# Patient Record
Sex: FEMALE | Race: WHITE | Employment: OTHER | URBAN - NONMETROPOLITAN AREA
[De-identification: names, ages, dates, MRNs, and addresses within clinical notes are randomized per-mention and may not be internally consistent; named-entity substitution may affect disease eponyms.]

---

## 2018-07-05 ENCOUNTER — HOSPITAL ENCOUNTER (EMERGENCY)
Age: 23
Discharge: HOME | End: 2018-07-05
Attending: FAMILY MEDICINE
Payer: COMMERCIAL

## 2018-07-05 ENCOUNTER — APPOINTMENT (OUTPATIENT)
Dept: GENERAL RADIOLOGY | Age: 23
End: 2018-07-05
Payer: COMMERCIAL

## 2018-07-05 VITALS
OXYGEN SATURATION: 98 % | RESPIRATION RATE: 18 BRPM | BODY MASS INDEX: 27.42 KG/M2 | TEMPERATURE: 98.5 F | HEIGHT: 62 IN | WEIGHT: 149 LBS | DIASTOLIC BLOOD PRESSURE: 78 MMHG | SYSTOLIC BLOOD PRESSURE: 102 MMHG | HEART RATE: 66 BPM

## 2018-07-05 DIAGNOSIS — F31.9 BIPOLAR 1 DISORDER (HCC): Primary | ICD-10-CM

## 2018-07-05 DIAGNOSIS — R06.89 DYSPNEA AND RESPIRATORY ABNORMALITIES: ICD-10-CM

## 2018-07-05 DIAGNOSIS — R06.00 DYSPNEA AND RESPIRATORY ABNORMALITIES: ICD-10-CM

## 2018-07-05 DIAGNOSIS — R07.89 OTHER CHEST PAIN: ICD-10-CM

## 2018-07-05 LAB
AMPHETAMINE+METHAMPHETAMINE URINE SCREEN: NEGATIVE
ANION GAP SERPL CALCULATED.3IONS-SCNC: 15 MEQ/L (ref 8–16)
BACTERIA: ABNORMAL /HPF
BARBITURATE QUANTITATIVE URINE: NEGATIVE
BASOPHILS # BLD: 0.3 %
BASOPHILS ABSOLUTE: 0 THOU/MM3 (ref 0–0.1)
BENZODIAZEPINE QUANTITATIVE URINE: NEGATIVE
BILIRUBIN URINE: NEGATIVE
BLOOD, URINE: NEGATIVE
BUN BLDV-MCNC: 10 MG/DL (ref 7–22)
CALCIUM SERPL-MCNC: 9.1 MG/DL (ref 8.5–10.5)
CANNABINOID QUANTITATIVE URINE: POSITIVE
CASTS 2: ABNORMAL /LPF
CASTS UA: ABNORMAL /LPF
CHARACTER, URINE: ABNORMAL
CHLORIDE BLD-SCNC: 107 MEQ/L (ref 98–111)
CO2: 22 MEQ/L (ref 23–33)
COCAINE METABOLITE QUANTITATIVE URINE: NEGATIVE
COLOR: YELLOW
CREAT SERPL-MCNC: 0.7 MG/DL (ref 0.4–1.2)
CRYSTALS, UA: ABNORMAL
D-DIMER QUANTITATIVE: < 215 NG/ML FEU (ref 0–500)
EKG ATRIAL RATE: 80 BPM
EKG P AXIS: 45 DEGREES
EKG P-R INTERVAL: 118 MS
EKG Q-T INTERVAL: 374 MS
EKG QRS DURATION: 92 MS
EKG QTC CALCULATION (BAZETT): 431 MS
EKG R AXIS: 34 DEGREES
EKG T AXIS: 33 DEGREES
EKG VENTRICULAR RATE: 80 BPM
EOSINOPHIL # BLD: 1.3 %
EOSINOPHILS ABSOLUTE: 0.1 THOU/MM3 (ref 0–0.4)
EPITHELIAL CELLS, UA: ABNORMAL /HPF
ERYTHROCYTE [DISTWIDTH] IN BLOOD BY AUTOMATED COUNT: 12.9 % (ref 11.5–14.5)
ERYTHROCYTE [DISTWIDTH] IN BLOOD BY AUTOMATED COUNT: 45.9 FL (ref 35–45)
GFR SERPL CREATININE-BSD FRML MDRD: > 90 ML/MIN/1.73M2
GLUCOSE BLD-MCNC: 82 MG/DL (ref 70–108)
GLUCOSE URINE: NEGATIVE MG/DL
HCT VFR BLD CALC: 41.3 % (ref 37–47)
HEMOGLOBIN: 13.8 GM/DL (ref 12–16)
IMMATURE GRANS (ABS): 0.04 THOU/MM3 (ref 0–0.07)
IMMATURE GRANULOCYTES: 0.4 %
KETONES, URINE: NEGATIVE
LEUKOCYTE ESTERASE, URINE: NEGATIVE
LYMPHOCYTES # BLD: 26.5 %
LYMPHOCYTES ABSOLUTE: 2.5 THOU/MM3 (ref 1–4.8)
MAGNESIUM: 2 MG/DL (ref 1.6–2.4)
MCH RBC QN AUTO: 32.3 PG (ref 26–33)
MCHC RBC AUTO-ENTMCNC: 33.4 GM/DL (ref 32.2–35.5)
MCV RBC AUTO: 96.7 FL (ref 81–99)
MISCELLANEOUS 2: ABNORMAL
MONOCYTES # BLD: 5.5 %
MONOCYTES ABSOLUTE: 0.5 THOU/MM3 (ref 0.4–1.3)
NITRITE, URINE: NEGATIVE
NUCLEATED RED BLOOD CELLS: 0 /100 WBC
OPIATES, URINE: NEGATIVE
OSMOLALITY CALCULATION: 285 MOSMOL/KG (ref 275–300)
OXYCODONE: NEGATIVE
PH UA: 7.5
PHENCYCLIDINE QUANTITATIVE URINE: NEGATIVE
PLATELET # BLD: 278 THOU/MM3 (ref 130–400)
PMV BLD AUTO: 9.9 FL (ref 9.4–12.4)
POTASSIUM SERPL-SCNC: 4.7 MEQ/L (ref 3.5–5.2)
PREGNANCY, SERUM: NEGATIVE
PROTEIN UA: NEGATIVE
RBC # BLD: 4.27 MILL/MM3 (ref 4.2–5.4)
RBC URINE: ABNORMAL /HPF
RENAL EPITHELIAL, UA: ABNORMAL
SEG NEUTROPHILS: 66 %
SEGMENTED NEUTROPHILS ABSOLUTE COUNT: 6.2 THOU/MM3 (ref 1.8–7.7)
SODIUM BLD-SCNC: 144 MEQ/L (ref 135–145)
SPECIFIC GRAVITY, URINE: 1.02 (ref 1–1.03)
TROPONIN T: < 0.01 NG/ML
UROBILINOGEN, URINE: 0.2 EU/DL
WBC # BLD: 9.4 THOU/MM3 (ref 4.8–10.8)
WBC UA: ABNORMAL /HPF
YEAST: ABNORMAL

## 2018-07-05 PROCEDURE — 80048 BASIC METABOLIC PNL TOTAL CA: CPT

## 2018-07-05 PROCEDURE — 84703 CHORIONIC GONADOTROPIN ASSAY: CPT

## 2018-07-05 PROCEDURE — 83735 ASSAY OF MAGNESIUM: CPT

## 2018-07-05 PROCEDURE — 6370000000 HC RX 637 (ALT 250 FOR IP): Performed by: FAMILY MEDICINE

## 2018-07-05 PROCEDURE — 81001 URINALYSIS AUTO W/SCOPE: CPT

## 2018-07-05 PROCEDURE — 71045 X-RAY EXAM CHEST 1 VIEW: CPT

## 2018-07-05 PROCEDURE — 85379 FIBRIN DEGRADATION QUANT: CPT

## 2018-07-05 PROCEDURE — 93005 ELECTROCARDIOGRAM TRACING: CPT | Performed by: FAMILY MEDICINE

## 2018-07-05 PROCEDURE — 85025 COMPLETE CBC W/AUTO DIFF WBC: CPT

## 2018-07-05 PROCEDURE — 93010 ELECTROCARDIOGRAM REPORT: CPT | Performed by: INTERNAL MEDICINE

## 2018-07-05 PROCEDURE — 84484 ASSAY OF TROPONIN QUANT: CPT

## 2018-07-05 PROCEDURE — 80307 DRUG TEST PRSMV CHEM ANLYZR: CPT

## 2018-07-05 PROCEDURE — 36415 COLL VENOUS BLD VENIPUNCTURE: CPT

## 2018-07-05 PROCEDURE — 99285 EMERGENCY DEPT VISIT HI MDM: CPT

## 2018-07-05 RX ORDER — BUSPIRONE HYDROCHLORIDE 5 MG/1
5 TABLET ORAL 2 TIMES DAILY
COMMUNITY
End: 2018-09-18

## 2018-07-05 RX ORDER — LORAZEPAM 1 MG/1
1 TABLET ORAL ONCE
Status: COMPLETED | OUTPATIENT
Start: 2018-07-05 | End: 2018-07-05

## 2018-07-05 RX ADMIN — LORAZEPAM 1 MG: 1 TABLET ORAL at 16:58

## 2018-07-05 ASSESSMENT — SLEEP AND FATIGUE QUESTIONNAIRES
SLEEP PATTERN: DIFFICULTY FALLING ASLEEP;DISTURBED/INTERRUPTED SLEEP
DIFFICULTY STAYING ASLEEP: YES
RESTFUL SLEEP: YES
DIFFICULTY ARISING: NO
DIFFICULTY FALLING ASLEEP: YES
DO YOU USE A SLEEP AID: NO
AVERAGE NUMBER OF SLEEP HOURS: 4
DO YOU HAVE DIFFICULTY SLEEPING: YES

## 2018-07-05 ASSESSMENT — ENCOUNTER SYMPTOMS
COUGH: 0
BACK PAIN: 0
DIARRHEA: 0
WHEEZING: 0
EYE DISCHARGE: 0
NAUSEA: 0
SHORTNESS OF BREATH: 1
VOMITING: 0
SORE THROAT: 0
RHINORRHEA: 0
EYE PAIN: 0
ABDOMINAL PAIN: 0

## 2018-07-05 ASSESSMENT — LIFESTYLE VARIABLES: HISTORY_ALCOHOL_USE: NO

## 2018-07-05 ASSESSMENT — PAIN DESCRIPTION - LOCATION: LOCATION: GENERALIZED

## 2018-07-05 ASSESSMENT — PAIN SCALES - GENERAL: PAINLEVEL_OUTOF10: 8

## 2018-07-05 ASSESSMENT — PAIN DESCRIPTION - PAIN TYPE: TYPE: ACUTE PAIN

## 2018-07-05 ASSESSMENT — PATIENT HEALTH QUESTIONNAIRE - PHQ9: SUM OF ALL RESPONSES TO PHQ QUESTIONS 1-9: 7
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
## 2018-07-05 NOTE — ED PROVIDER NOTES
created using voice recognition software. It may contain minor errors which are inherent in voice recognition technology. **      Final report electronically signed by Dr. Leif Flores on 7/5/2018 5:07 PM          LABS:   Labs Reviewed   CBC WITH AUTO DIFFERENTIAL - Abnormal; Notable for the following:        Result Value    RDW-SD 45.9 (*)     All other components within normal limits   BASIC METABOLIC PANEL - Abnormal; Notable for the following:     CO2 22 (*)     All other components within normal limits   URINE WITH REFLEXED MICRO - Abnormal; Notable for the following:     Character, Urine CLOUDY (*)     All other components within normal limits   C. TRACHOMATIS / N. GONORRHOEAE, DNA   D-DIMER, QUANTITATIVE   TROPONIN   MAGNESIUM   HCG, SERUM, QUALITATIVE   URINE DRUG SCREEN   ANION GAP   GLOMERULAR FILTRATION RATE, ESTIMATED   OSMOLALITY       EMERGENCY DEPARTMENT COURSE:   Vitals:    Vitals:    07/05/18 1650 07/05/18 1802 07/05/18 2200   BP: 116/71 108/64 102/78   Pulse: 84 69 66   Resp: 18 18 18   Temp: 98.5 °F (36.9 °C)     TempSrc: Oral     SpO2: 99% 97% 98%   Weight: 149 lb (67.6 kg)     Height: 5' 2\" (1.575 m)         5:02 PM: The patient was seen and evaluated. MDM:  The pt was seen and evaluated by me. Within the department, I observed the pt's vital signs to be within acceptable range. Laboratory and Radiological studies were performed, results were reviewed with the patient and family. Within the department, the pt was treated with Ativan. I observed the pt's condition to remain stable during the duration of their stay. Patient was evaluated by social work and he relaxes recommended admission to [place] [address]. I explained my proposed course of treatment to the pt and family, and they were amenable to my decision. They were discharged home, and they will return to the ED if their symptoms become more severe in nature, or otherwise change.        CRITICAL CARE:   None     CONSULTS:  AMAYA - recommended admission to [facility]:  None     FINAL IMPRESSION      1. Bipolar 1 disorder ([address]    2. Other chest pain    3. Dyspnea and respiratory abnormalities          DISPOSITION/PLAN   Discharge     PATIENT REFERRED TO:  [address] [place], [place] [id number]  [facility]        DISCHARGE MEDICATIONS:  Discharge Medication List as of 7/5/2018 10:20 PM          (Please note that portions of this note were completed with a voice recognition program.  Efforts were made to edit the dictations but occasionally words are mis-transcribed.)    Scribe:  Adelaida Nguyen   7/5/18 5:02 PM Scribing for and in the presence of Bel Roth MD.    Signed by: Jeanne Morales, 07/06/18 12:22 PM    Provider:  I personally performed the services described in the documentation, reviewed and edited the documentation which was dictated to the scribe in my presence, and it accurately records my words and actions.     Bel Roth MD 7/5/18 12:22 PM                          Bel Roth MD  07/06/18 [id number]
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
## 2018-07-06 ENCOUNTER — HOSPITAL ENCOUNTER (EMERGENCY)
Age: 23
Discharge: HOME | End: 2018-07-06
Attending: INTERNAL MEDICINE
Payer: COMMERCIAL

## 2018-07-06 ENCOUNTER — APPOINTMENT (OUTPATIENT)
Dept: GENERAL RADIOLOGY | Age: 23
End: 2018-07-06
Payer: COMMERCIAL

## 2018-07-06 VITALS
TEMPERATURE: 98.2 F | DIASTOLIC BLOOD PRESSURE: 60 MMHG | HEIGHT: 62 IN | OXYGEN SATURATION: 99 % | SYSTOLIC BLOOD PRESSURE: 103 MMHG | RESPIRATION RATE: 16 BRPM | HEART RATE: 80 BPM | BODY MASS INDEX: 27.42 KG/M2 | WEIGHT: 149 LBS

## 2018-07-06 DIAGNOSIS — F41.9 ANXIETY: Primary | ICD-10-CM

## 2018-07-06 LAB
ALLEN TEST: POSITIVE
ANION GAP SERPL CALCULATED.3IONS-SCNC: 13 MEQ/L (ref 8–16)
BASE EXCESS (CALCULATED): -2 MMOL/L (ref -2.5–2.5)
BASOPHILS # BLD: 0.2 %
BASOPHILS ABSOLUTE: 0 THOU/MM3 (ref 0–0.1)
BUN BLDV-MCNC: 12 MG/DL (ref 7–22)
CALCIUM SERPL-MCNC: 9.3 MG/DL (ref 8.5–10.5)
CHLAMYDIA TRACHOMATIS BY RT-PCR: NOT DETECTED
CHLORIDE BLD-SCNC: 110 MEQ/L (ref 98–111)
CO2: 22 MEQ/L (ref 23–33)
COLLECTED BY:: ABNORMAL
CREAT SERPL-MCNC: 0.8 MG/DL (ref 0.4–1.2)
CT/NG SOURCE: NORMAL
D-DIMER QUANTITATIVE: 256 NG/ML FEU (ref 0–500)
DEVICE: ABNORMAL
EOSINOPHIL # BLD: 0.5 %
EOSINOPHILS ABSOLUTE: 0.1 THOU/MM3 (ref 0–0.4)
ERYTHROCYTE [DISTWIDTH] IN BLOOD BY AUTOMATED COUNT: 12.7 % (ref 11.5–14.5)
ERYTHROCYTE [DISTWIDTH] IN BLOOD BY AUTOMATED COUNT: 45.1 FL (ref 35–45)
GFR SERPL CREATININE-BSD FRML MDRD: 87 ML/MIN/1.73M2
GLUCOSE BLD-MCNC: 88 MG/DL (ref 70–108)
HCO3: 22 MMOL/L (ref 23–28)
HCT VFR BLD CALC: 42.1 % (ref 37–47)
HEMOGLOBIN: 14.1 GM/DL (ref 12–16)
IMMATURE GRANS (ABS): 0.04 THOU/MM3 (ref 0–0.07)
IMMATURE GRANULOCYTES: 0.3 %
LYMPHOCYTES # BLD: 14.8 %
LYMPHOCYTES ABSOLUTE: 1.9 THOU/MM3 (ref 1–4.8)
MCH RBC QN AUTO: 32.3 PG (ref 26–33)
MCHC RBC AUTO-ENTMCNC: 33.5 GM/DL (ref 32.2–35.5)
MCV RBC AUTO: 96.3 FL (ref 81–99)
MONOCYTES # BLD: 3.7 %
MONOCYTES ABSOLUTE: 0.5 THOU/MM3 (ref 0.4–1.3)
NEISSERIA GONORRHOEAE BY RT-PCR: NOT DETECTED
NUCLEATED RED BLOOD CELLS: 0 /100 WBC
O2 SATURATION: 98 %
OSMOLALITY CALCULATION: 287.9 MOSMOL/KG (ref 275–300)
PCO2: 34 MMHG (ref 35–45)
PH BLOOD GAS: 7.42 (ref 7.35–7.45)
PLATELET # BLD: 301 THOU/MM3 (ref 130–400)
PMV BLD AUTO: 10.1 FL (ref 9.4–12.4)
PO2: 103 MMHG (ref 71–104)
POTASSIUM SERPL-SCNC: 4.8 MEQ/L (ref 3.5–5.2)
RBC # BLD: 4.37 MILL/MM3 (ref 4.2–5.4)
SEG NEUTROPHILS: 80.5 %
SEGMENTED NEUTROPHILS ABSOLUTE COUNT: 10.4 THOU/MM3 (ref 1.8–7.7)
SODIUM BLD-SCNC: 145 MEQ/L (ref 135–145)
SOURCE, BLOOD GAS: ABNORMAL
WBC # BLD: 12.9 THOU/MM3 (ref 4.8–10.8)

## 2018-07-06 PROCEDURE — 6370000000 HC RX 637 (ALT 250 FOR IP): Performed by: INTERNAL MEDICINE

## 2018-07-06 PROCEDURE — 87491 CHLMYD TRACH DNA AMP PROBE: CPT

## 2018-07-06 PROCEDURE — 96372 THER/PROPH/DIAG INJ SC/IM: CPT

## 2018-07-06 PROCEDURE — 85025 COMPLETE CBC W/AUTO DIFF WBC: CPT

## 2018-07-06 PROCEDURE — 71045 X-RAY EXAM CHEST 1 VIEW: CPT

## 2018-07-06 PROCEDURE — 99284 EMERGENCY DEPT VISIT MOD MDM: CPT

## 2018-07-06 PROCEDURE — 94640 AIRWAY INHALATION TREATMENT: CPT

## 2018-07-06 PROCEDURE — 6360000002 HC RX W HCPCS: Performed by: INTERNAL MEDICINE

## 2018-07-06 PROCEDURE — 36600 WITHDRAWAL OF ARTERIAL BLOOD: CPT

## 2018-07-06 PROCEDURE — 87591 N.GONORRHOEAE DNA AMP PROB: CPT

## 2018-07-06 PROCEDURE — 80048 BASIC METABOLIC PNL TOTAL CA: CPT

## 2018-07-06 PROCEDURE — 36415 COLL VENOUS BLD VENIPUNCTURE: CPT

## 2018-07-06 PROCEDURE — 82803 BLOOD GASES ANY COMBINATION: CPT

## 2018-07-06 PROCEDURE — 85379 FIBRIN DEGRADATION QUANT: CPT

## 2018-07-06 RX ORDER — PROMETHAZINE HYDROCHLORIDE 25 MG/ML
6.25 INJECTION, SOLUTION INTRAMUSCULAR; INTRAVENOUS ONCE
Status: COMPLETED | OUTPATIENT
Start: 2018-07-06 | End: 2018-07-06

## 2018-07-06 RX ORDER — PROMETHAZINE HYDROCHLORIDE 25 MG/1
12.5 TABLET ORAL EVERY 6 HOURS PRN
Qty: 10 TABLET | Refills: 0 | Status: SHIPPED | OUTPATIENT
Start: 2018-07-06 | End: 2018-07-26

## 2018-07-06 RX ORDER — IPRATROPIUM BROMIDE AND ALBUTEROL SULFATE 2.5; .5 MG/3ML; MG/3ML
1 SOLUTION RESPIRATORY (INHALATION) ONCE
Status: COMPLETED | OUTPATIENT
Start: 2018-07-06 | End: 2018-07-06

## 2018-07-06 RX ADMIN — IPRATROPIUM BROMIDE AND ALBUTEROL SULFATE 1 AMPULE: .5; 3 SOLUTION RESPIRATORY (INHALATION) at 12:43

## 2018-07-06 RX ADMIN — PROMETHAZINE HYDROCHLORIDE 6.25 MG: 25 INJECTION INTRAMUSCULAR; INTRAVENOUS at 12:36

## 2018-07-06 ASSESSMENT — ENCOUNTER SYMPTOMS
EYE DISCHARGE: 0
VOMITING: 1
SORE THROAT: 0
RHINORRHEA: 0
EYE PAIN: 0
COUGH: 0
NAUSEA: 0
DIARRHEA: 0
SHORTNESS OF BREATH: 1
BACK PAIN: 0
WHEEZING: 0
ABDOMINAL PAIN: 0

## 2018-07-06 ASSESSMENT — PAIN DESCRIPTION - LOCATION: LOCATION: THROAT;BACK

## 2018-07-06 ASSESSMENT — PAIN SCALES - GENERAL: PAINLEVEL_OUTOF10: 6

## 2018-07-06 ASSESSMENT — PAIN DESCRIPTION - PAIN TYPE: TYPE: CHRONIC PAIN

## 2018-07-06 NOTE — PROGRESS NOTES
2110  Call from Fabby Melendez, pt declined at the [facility]. Fabby Melendez state this due to Dr. Arely Cruz declining inpatient treatment and recommending outpatient mental health services, and pt has been assessed by [facility] who will follow up with pt tomorrow.   (Nurse Josefa Barfield informed)

## 2018-07-06 NOTE — ED PROVIDER NOTES
Psychiatric/Behavioral: Positive for sleep disturbance. Negative for confusion and suicidal ideas. The patient is nervous/anxious. PAST MEDICAL HISTORY    has a past medical history of Arthritis; Bipolar 1 disorder ([address] Diabetes mellitus ([address] and Fibromyalgia. SURGICAL HISTORY      has no past surgical history on file. CURRENT MEDICATIONS       Previous Medications    BUSPIRONE (BUSPAR) 5 MG TABLET    Take 5 mg by mouth 2 times daily       ALLERGIES     is allergic to gabapentin and tramadol. FAMILY HISTORY     has no family status information on file. family history is not on file. SOCIAL HISTORY      reports that she has been smoking Cigarettes. She has been smoking about 1.00 pack per day. She has never used smokeless tobacco. She reports that she drinks alcohol. She reports that she uses drugs, including Marijuana and Methamphetamines. PHYSICAL EXAM     INITIAL VITALS:  height is 5' 2\" (1.575 m) and weight is 149 lb (67.6 kg). Her oral temperature is 98.2 °F (36.8 °C). Her blood pressure is 103/60 and her pulse is 80. Her respiration is 16 and oxygen saturation is 99%. Physical Exam   Constitutional: She is oriented to person, place, and time. She appears well-developed and well-nourished. Non-toxic appearance. HENT:   Head: Normocephalic and atraumatic. Right Ear: Tympanic membrane and external ear normal.   Left Ear: Tympanic membrane and external ear normal.   Nose: Nose normal.   Mouth/Throat: Oropharynx is clear and moist and mucous membranes are normal. No oropharyngeal exudate, posterior oropharyngeal edema or posterior oropharyngeal erythema. Patient has rhinosinusitis. Eyes: Conjunctivae and EOM are normal.   Neck: Normal range of motion. Neck supple. No JVD present. Cardiovascular: Normal rate, regular rhythm, normal heart sounds, intact distal pulses and normal pulses. Exam reveals no gallop and no friction rub. No murmur heard.   Pulmonary/Chest: Effort normal and breath sounds normal. No respiratory distress. She has no decreased breath sounds. She has no wheezes. She has no rhonchi. She has no rales. Abdominal: Soft. Bowel sounds are normal. She exhibits no distension. There is no tenderness. There is no rebound, no guarding and no CVA tenderness. Musculoskeletal: Normal range of motion. She exhibits no edema. Neurological: She is alert and oriented to person, place, and time. She exhibits normal muscle tone. Coordination normal.   Skin: Skin is warm and dry. No rash noted. She is not diaphoretic. Nails show clubbing. Nursing note and vitals reviewed. DIAGNOSTIC RESULTS     EKG: All EKG's are interpreted by the Emergency Department Physician who either signs or Co-signs this chart in the absence of a cardiologist.  EKG interpreted by Lizette Cook MD:    None    RADIOLOGY: non-plain film images(s) such as CT, Ultrasound and MRI are read by the radiologist.    XR CHEST PORTABLE   Final Result   No acute findings               **This report has been created using voice recognition software. It may contain minor errors which are inherent in voice recognition technology. **      Final report electronically signed by Dr. Musa Cabrales on 7/6/2018 12:38 PM          LABS:   Labs Reviewed   CBC WITH AUTO DIFFERENTIAL - Abnormal; Notable for the following:        Result Value    WBC 12.9 (*)     RDW-SD 45.1 (*)     Segs Absolute 10.4 (*)     All other components within normal limits   BASIC METABOLIC PANEL - Abnormal; Notable for the following:     CO2 22 (*)     All other components within normal limits   BLOOD GAS, ARTERIAL - Abnormal; Notable for the following:     PCO2 34 (*)     HCO3 22 (*)     All other components within normal limits   GLOMERULAR FILTRATION RATE, ESTIMATED - Abnormal; Notable for the following:     Est, Glom Filt Rate 87 (*)     All other components within normal limits   C. TRACHOMATIS / N. GONORRHOEAE, DNA   D-DIMER, QUANTITATIVE ANION GAP   OSMOLALITY       EMERGENCY DEPARTMENT COURSE:   Vitals:    Vitals:    07/06/18 1332 07/06/18 1348 07/06/18 1448 07/06/18 1504   BP: 112/64 104/63 (!) 97/55 103/60   Pulse: 80      Resp: 16      Temp:       TempSrc:       SpO2: 97% 95% 98% 99%   Weight:       Height:           12:07 PM: The patient was seen and evaluated. MDM:  The patient has history anxiety, was hemodynamically stable. Patient's pulse ox is also good for her age. Patient was given Phenergan in the emergency Department and all her symptoms were gone. Patient was supposed to see [facility] professional services today, but they cannot see her for Monday and that triggered her anxiety and shortness of breath. Patient also was given a breathing treatment which did not help. but the Phenergan did help her a lot. Patient will be provided with prescription for Phenergan. Patient will be going to [facility] and will be seeing [employer] at [facility] on Monday morning. Patient's blood work was reviewed discussed with the patient. CRITICAL CARE:   None     CONSULTS:  None    PROCEDURES:  None     FINAL IMPRESSION      1. Anxiety          DISPOSITION/PLAN   Discharge    PATIENT REFERRED TO:  [facility]  [address] [facility] [address]  Go in 2 days      [address] [facility]  [address]  Go in 1 day  If symptoms worsen    [address] [facility]  [phone]    Call in 2 days        DISCHARGE MEDICATIONS:  New Prescriptions    PROMETHAZINE (PHENERGAN) 25 MG TABLET    Take 0.5 tablets by mouth every 6 hours as needed for Nausea WARNING:  May cause drowsiness. May impair ability to operate vehicles or machinery. Do not use in combination with alcohol.        (Please note that portions of this note were completed with a voice recognition program.  Efforts were made to edit the dictations but occasionally words are mis-transcribed.)    Scribe:  Mary Jane Fernandes 7/6/18 12:07 PM Scribing for and in the presence of Mary Jane Fernandes MD.    Signed by: Jeanne Bonner, 07/06/18 3:22 PM    Provider:  I personally performed the services described in the documentation, reviewed and edited the documentation which was dictated to the scribe in my presence, and it accurately records my words and actions.     Mary Jane Fernandes MD 7/6/18 3:22 PM                         Mary Jane Fernandes MD  07/06/18 1524

## 2018-09-18 ENCOUNTER — HOSPITAL ENCOUNTER (EMERGENCY)
Age: 23
Discharge: LEFT BEFORE DISCHARGE | End: 2018-09-18
Attending: FAMILY MEDICINE
Payer: COMMERCIAL

## 2018-09-18 VITALS
RESPIRATION RATE: 18 BRPM | SYSTOLIC BLOOD PRESSURE: 116 MMHG | OXYGEN SATURATION: 96 % | HEIGHT: 62 IN | DIASTOLIC BLOOD PRESSURE: 92 MMHG | BODY MASS INDEX: 27.6 KG/M2 | HEART RATE: 83 BPM | WEIGHT: 150 LBS | TEMPERATURE: 98.7 F

## 2018-09-18 DIAGNOSIS — F32.A DEPRESSION, UNSPECIFIED DEPRESSION TYPE: Primary | ICD-10-CM

## 2018-09-18 LAB
ALBUMIN SERPL-MCNC: 4.6 G/DL (ref 3.5–5.1)
ALP BLD-CCNC: 71 U/L (ref 38–126)
ALT SERPL-CCNC: 8 U/L (ref 11–66)
AMORPHOUS: ABNORMAL
AMPHETAMINE+METHAMPHETAMINE URINE SCREEN: NEGATIVE
ANION GAP SERPL CALCULATED.3IONS-SCNC: 17 MEQ/L (ref 8–16)
AST SERPL-CCNC: 16 U/L (ref 5–40)
BACTERIA: ABNORMAL /HPF
BARBITURATE QUANTITATIVE URINE: NEGATIVE
BASOPHILS # BLD: 0.3 %
BASOPHILS ABSOLUTE: 0 THOU/MM3 (ref 0–0.1)
BENZODIAZEPINE QUANTITATIVE URINE: NEGATIVE
BILIRUB SERPL-MCNC: 0.4 MG/DL (ref 0.3–1.2)
BILIRUBIN URINE: ABNORMAL
BLOOD, URINE: ABNORMAL
BUN BLDV-MCNC: 11 MG/DL (ref 7–22)
CALCIUM SERPL-MCNC: 10 MG/DL (ref 8.5–10.5)
CANNABINOID QUANTITATIVE URINE: NEGATIVE
CASTS 2: ABNORMAL /LPF
CASTS UA: ABNORMAL /LPF
CHARACTER, URINE: CLEAR
CHLORIDE BLD-SCNC: 100 MEQ/L (ref 98–111)
CO2: 21 MEQ/L (ref 23–33)
COCAINE METABOLITE QUANTITATIVE URINE: NEGATIVE
COLOR: YELLOW
CREAT SERPL-MCNC: 0.8 MG/DL (ref 0.4–1.2)
CRYSTALS, UA: ABNORMAL
CRYSTALS, UA: ABNORMAL
EKG ATRIAL RATE: 90 BPM
EKG P AXIS: 54 DEGREES
EKG P-R INTERVAL: 138 MS
EKG Q-T INTERVAL: 358 MS
EKG QRS DURATION: 90 MS
EKG QTC CALCULATION (BAZETT): 437 MS
EKG R AXIS: 23 DEGREES
EKG T AXIS: 24 DEGREES
EKG VENTRICULAR RATE: 90 BPM
EOSINOPHIL # BLD: 1.7 %
EOSINOPHILS ABSOLUTE: 0.1 THOU/MM3 (ref 0–0.4)
EPITHELIAL CELLS, UA: ABNORMAL /HPF
ERYTHROCYTE [DISTWIDTH] IN BLOOD BY AUTOMATED COUNT: 11.9 % (ref 11.5–14.5)
ERYTHROCYTE [DISTWIDTH] IN BLOOD BY AUTOMATED COUNT: 40.6 FL (ref 35–45)
GFR SERPL CREATININE-BSD FRML MDRD: 87 ML/MIN/1.73M2
GLUCOSE BLD-MCNC: 74 MG/DL (ref 70–108)
GLUCOSE URINE: NEGATIVE MG/DL
HCT VFR BLD CALC: 43.7 % (ref 37–47)
HEMOGLOBIN: 14.9 GM/DL (ref 12–16)
ICTOTEST: NEGATIVE
IMMATURE GRANS (ABS): 0.02 THOU/MM3 (ref 0–0.07)
IMMATURE GRANULOCYTES: 0.3 %
KETONES, URINE: NEGATIVE
LEUKOCYTE ESTERASE, URINE: ABNORMAL
LYMPHOCYTES # BLD: 31.5 %
LYMPHOCYTES ABSOLUTE: 2.4 THOU/MM3 (ref 1–4.8)
MCH RBC QN AUTO: 31.9 PG (ref 26–33)
MCHC RBC AUTO-ENTMCNC: 34.1 GM/DL (ref 32.2–35.5)
MCV RBC AUTO: 93.6 FL (ref 81–99)
MISCELLANEOUS 2: ABNORMAL
MISCELLANEOUS LAB TEST RESULT: ABNORMAL
MONOCYTES # BLD: 7.1 %
MONOCYTES ABSOLUTE: 0.5 THOU/MM3 (ref 0.4–1.3)
MUCUS: ABNORMAL
NITRITE, URINE: NEGATIVE
NUCLEATED RED BLOOD CELLS: 0 /100 WBC
OPIATES, URINE: NEGATIVE
OSMOLALITY CALCULATION: 273.7 MOSMOL/KG (ref 275–300)
OXYCODONE: NEGATIVE
PH UA: 6
PHENCYCLIDINE QUANTITATIVE URINE: NEGATIVE
PLATELET # BLD: 298 THOU/MM3 (ref 130–400)
PMV BLD AUTO: 10.3 FL (ref 9.4–12.4)
POTASSIUM SERPL-SCNC: 4.7 MEQ/L (ref 3.5–5.2)
PREGNANCY, URINE: NEGATIVE
PROTEIN UA: ABNORMAL
RBC # BLD: 4.67 MILL/MM3 (ref 4.2–5.4)
RBC URINE: ABNORMAL /HPF
RENAL EPITHELIAL, UA: ABNORMAL
SEG NEUTROPHILS: 59.1 %
SEGMENTED NEUTROPHILS ABSOLUTE COUNT: 4.5 THOU/MM3 (ref 1.8–7.7)
SODIUM BLD-SCNC: 138 MEQ/L (ref 135–145)
SPECIFIC GRAVITY, URINE: >= 1.03 (ref 1–1.03)
TOTAL PROTEIN: 8.1 G/DL (ref 6.1–8)
TSH SERPL DL<=0.05 MIU/L-ACNC: 0.99 UIU/ML (ref 0.4–4.2)
UROBILINOGEN, URINE: 0.2 EU/DL
WBC # BLD: 7.6 THOU/MM3 (ref 4.8–10.8)
WBC UA: ABNORMAL /HPF
YEAST: ABNORMAL

## 2018-09-18 PROCEDURE — 85025 COMPLETE CBC W/AUTO DIFF WBC: CPT

## 2018-09-18 PROCEDURE — 99284 EMERGENCY DEPT VISIT MOD MDM: CPT

## 2018-09-18 PROCEDURE — 80307 DRUG TEST PRSMV CHEM ANLYZR: CPT

## 2018-09-18 PROCEDURE — 87086 URINE CULTURE/COLONY COUNT: CPT

## 2018-09-18 PROCEDURE — 80053 COMPREHEN METABOLIC PANEL: CPT

## 2018-09-18 PROCEDURE — 36415 COLL VENOUS BLD VENIPUNCTURE: CPT

## 2018-09-18 PROCEDURE — 93005 ELECTROCARDIOGRAM TRACING: CPT | Performed by: FAMILY MEDICINE

## 2018-09-18 PROCEDURE — 81001 URINALYSIS AUTO W/SCOPE: CPT

## 2018-09-18 PROCEDURE — 81025 URINE PREGNANCY TEST: CPT

## 2018-09-18 PROCEDURE — 84443 ASSAY THYROID STIM HORMONE: CPT

## 2018-09-18 RX ORDER — RISPERIDONE 1 MG/1
1 TABLET, FILM COATED ORAL 2 TIMES DAILY
Status: ON HOLD | COMMUNITY
End: 2018-10-06

## 2018-09-18 RX ORDER — CLOMIPRAMINE HYDROCHLORIDE 50 MG/1
100 CAPSULE ORAL NIGHTLY
Status: ON HOLD | COMMUNITY
End: 2018-10-06 | Stop reason: HOSPADM

## 2018-09-18 ASSESSMENT — ENCOUNTER SYMPTOMS
RHINORRHEA: 0
ABDOMINAL PAIN: 1
EYE DISCHARGE: 0
SORE THROAT: 0
BACK PAIN: 0
DIARRHEA: 0
EYE PAIN: 0
NAUSEA: 0
WHEEZING: 0
SHORTNESS OF BREATH: 0
COUGH: 0
VOMITING: 0

## 2018-09-18 NOTE — ED TRIAGE NOTES
Pt presents to rm 44 with grandmother c/o being sent from [facility] and needing a psych eval. Pt has been seen here before for the same reason. Grandmother states that pt has not eaten in 2-3 days and has not been getting out of bed for her daily activities. Dr. Jovita Howard at bedside for evaluation. Level B paged. Pt denies suicidal or homicidal thoughts at this time. Updated on POC. VSS. Call light in reach. Will monitor.

## 2018-09-18 NOTE — ED NOTES
[facility] with AMAYA at bedside to inform pt and grandmother on plan for discharge. Pt and grandmother very upset and do not want to wait for discharge or to talk to Dr. Fareed Rm. Pt dressed and walking out of department when Dr. Fareed Rm was walking to pt's rm. Spoke with pt, still unhappy with care and walked out to ED lobby.  Laila Yoder RN, which pt and grandmother requested to be notified at side of pt to speak with her and grandmother when pt out of hospital.        Ellie Pack RN  09/18/18 6939
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
## 2018-09-19 LAB
ORGANISM: ABNORMAL
URINE CULTURE REFLEX: ABNORMAL

## 2018-09-19 PROCEDURE — 93010 ELECTROCARDIOGRAM REPORT: CPT | Performed by: INTERNAL MEDICINE

## 2018-10-02 ENCOUNTER — APPOINTMENT (OUTPATIENT)
Dept: CT IMAGING | Age: 23
DRG: 885 | End: 2018-10-02
Payer: COMMERCIAL

## 2018-10-02 ENCOUNTER — HOSPITAL ENCOUNTER (INPATIENT)
Age: 23
LOS: 4 days | Discharge: HOME | DRG: 885 | End: 2018-10-06
Attending: EMERGENCY MEDICINE | Admitting: PSYCHIATRY & NEUROLOGY
Payer: COMMERCIAL

## 2018-10-02 ENCOUNTER — APPOINTMENT (OUTPATIENT)
Dept: GENERAL RADIOLOGY | Age: 23
DRG: 885 | End: 2018-10-02
Payer: COMMERCIAL

## 2018-10-02 DIAGNOSIS — F41.1 ANXIETY STATE: Primary | ICD-10-CM

## 2018-10-02 DIAGNOSIS — R07.89 CHEST TIGHTNESS: ICD-10-CM

## 2018-10-02 PROBLEM — F20.9 SCHIZOPHRENIA (HCC): Status: ACTIVE | Noted: 2018-10-02

## 2018-10-02 LAB
ALBUMIN SERPL-MCNC: 4.7 G/DL (ref 3.5–5.1)
ALP BLD-CCNC: 61 U/L (ref 38–126)
ALT SERPL-CCNC: 9 U/L (ref 11–66)
ANION GAP SERPL CALCULATED.3IONS-SCNC: 16 MEQ/L (ref 8–16)
AST SERPL-CCNC: 18 U/L (ref 5–40)
BACTERIA: ABNORMAL /HPF
BASOPHILS # BLD: 0.5 %
BASOPHILS ABSOLUTE: 0 THOU/MM3 (ref 0–0.1)
BILIRUB SERPL-MCNC: 0.2 MG/DL (ref 0.3–1.2)
BILIRUBIN URINE: NEGATIVE
BLOOD, URINE: NEGATIVE
BUN BLDV-MCNC: 14 MG/DL (ref 7–22)
CALCIUM SERPL-MCNC: 9.5 MG/DL (ref 8.5–10.5)
CASTS 2: ABNORMAL /LPF
CASTS UA: ABNORMAL /LPF
CHARACTER, URINE: ABNORMAL
CHLORIDE BLD-SCNC: 103 MEQ/L (ref 98–111)
CO2: 19 MEQ/L (ref 23–33)
COLOR: YELLOW
CREAT SERPL-MCNC: 0.8 MG/DL (ref 0.4–1.2)
CRYSTALS, UA: ABNORMAL
D-DIMER QUANTITATIVE: 529 NG/ML FEU (ref 0–500)
EKG ATRIAL RATE: 87 BPM
EKG P AXIS: 50 DEGREES
EKG P-R INTERVAL: 134 MS
EKG Q-T INTERVAL: 370 MS
EKG QRS DURATION: 92 MS
EKG QTC CALCULATION (BAZETT): 445 MS
EKG R AXIS: 56 DEGREES
EKG T AXIS: 35 DEGREES
EKG VENTRICULAR RATE: 87 BPM
EOSINOPHIL # BLD: 2.4 %
EOSINOPHILS ABSOLUTE: 0.2 THOU/MM3 (ref 0–0.4)
EPITHELIAL CELLS, UA: ABNORMAL /HPF
ERYTHROCYTE [DISTWIDTH] IN BLOOD BY AUTOMATED COUNT: 11.9 % (ref 11.5–14.5)
ERYTHROCYTE [DISTWIDTH] IN BLOOD BY AUTOMATED COUNT: 41 FL (ref 35–45)
GFR SERPL CREATININE-BSD FRML MDRD: 87 ML/MIN/1.73M2
GLUCOSE BLD-MCNC: 92 MG/DL (ref 70–108)
GLUCOSE URINE: NEGATIVE MG/DL
HCT VFR BLD CALC: 41.3 % (ref 37–47)
HEMOGLOBIN: 14.2 GM/DL (ref 12–16)
IMMATURE GRANS (ABS): 0.02 THOU/MM3 (ref 0–0.07)
IMMATURE GRANULOCYTES: 0.2 %
KETONES, URINE: NEGATIVE
LEUKOCYTE ESTERASE, URINE: NEGATIVE
LYMPHOCYTES # BLD: 31.1 %
LYMPHOCYTES ABSOLUTE: 2.6 THOU/MM3 (ref 1–4.8)
MCH RBC QN AUTO: 32.1 PG (ref 26–33)
MCHC RBC AUTO-ENTMCNC: 34.4 GM/DL (ref 32.2–35.5)
MCV RBC AUTO: 93.4 FL (ref 81–99)
MISCELLANEOUS 2: ABNORMAL
MONOCYTES # BLD: 7.1 %
MONOCYTES ABSOLUTE: 0.6 THOU/MM3 (ref 0.4–1.3)
NITRITE, URINE: NEGATIVE
NUCLEATED RED BLOOD CELLS: 0 /100 WBC
OSMOLALITY CALCULATION: 275.8 MOSMOL/KG (ref 275–300)
PH UA: 5.5
PLATELET # BLD: 332 THOU/MM3 (ref 130–400)
PMV BLD AUTO: 9.7 FL (ref 9.4–12.4)
POTASSIUM SERPL-SCNC: 4.5 MEQ/L (ref 3.5–5.2)
PREGNANCY, SERUM: NEGATIVE
PROTEIN UA: NEGATIVE
RBC # BLD: 4.42 MILL/MM3 (ref 4.2–5.4)
RBC URINE: ABNORMAL /HPF
RENAL EPITHELIAL, UA: ABNORMAL
SEG NEUTROPHILS: 58.7 %
SEGMENTED NEUTROPHILS ABSOLUTE COUNT: 4.9 THOU/MM3 (ref 1.8–7.7)
SODIUM BLD-SCNC: 138 MEQ/L (ref 135–145)
SPECIFIC GRAVITY, URINE: 1.02 (ref 1–1.03)
TOTAL PROTEIN: 7.5 G/DL (ref 6.1–8)
UROBILINOGEN, URINE: 0.2 EU/DL
WBC # BLD: 8.4 THOU/MM3 (ref 4.8–10.8)
WBC UA: ABNORMAL /HPF
YEAST: ABNORMAL

## 2018-10-02 PROCEDURE — 85379 FIBRIN DEGRADATION QUANT: CPT

## 2018-10-02 PROCEDURE — 93005 ELECTROCARDIOGRAM TRACING: CPT | Performed by: EMERGENCY MEDICINE

## 2018-10-02 PROCEDURE — 99285 EMERGENCY DEPT VISIT HI MDM: CPT

## 2018-10-02 PROCEDURE — 1240000000 HC EMOTIONAL WELLNESS R&B

## 2018-10-02 PROCEDURE — 93010 ELECTROCARDIOGRAM REPORT: CPT | Performed by: INTERNAL MEDICINE

## 2018-10-02 PROCEDURE — 6360000004 HC RX CONTRAST MEDICATION: Performed by: EMERGENCY MEDICINE

## 2018-10-02 PROCEDURE — 81001 URINALYSIS AUTO W/SCOPE: CPT

## 2018-10-02 PROCEDURE — 84703 CHORIONIC GONADOTROPIN ASSAY: CPT

## 2018-10-02 PROCEDURE — 6370000000 HC RX 637 (ALT 250 FOR IP): Performed by: EMERGENCY MEDICINE

## 2018-10-02 PROCEDURE — 71275 CT ANGIOGRAPHY CHEST: CPT

## 2018-10-02 PROCEDURE — 80053 COMPREHEN METABOLIC PANEL: CPT

## 2018-10-02 PROCEDURE — 96374 THER/PROPH/DIAG INJ IV PUSH: CPT

## 2018-10-02 PROCEDURE — 85025 COMPLETE CBC W/AUTO DIFF WBC: CPT

## 2018-10-02 PROCEDURE — 71046 X-RAY EXAM CHEST 2 VIEWS: CPT

## 2018-10-02 PROCEDURE — 36415 COLL VENOUS BLD VENIPUNCTURE: CPT

## 2018-10-02 RX ORDER — TRAZODONE HYDROCHLORIDE 50 MG/1
50 TABLET ORAL NIGHTLY PRN
Status: DISCONTINUED | OUTPATIENT
Start: 2018-10-02 | End: 2018-10-06 | Stop reason: HOSPADM

## 2018-10-02 RX ORDER — HYDROXYZINE PAMOATE 50 MG/1
50 CAPSULE ORAL 3 TIMES DAILY PRN
Status: DISCONTINUED | OUTPATIENT
Start: 2018-10-02 | End: 2018-10-06 | Stop reason: HOSPADM

## 2018-10-02 RX ORDER — M-VIT,TX,IRON,MINS/CALC/FOLIC 27MG-0.4MG
1 TABLET ORAL DAILY
Status: DISCONTINUED | OUTPATIENT
Start: 2018-10-03 | End: 2018-10-06 | Stop reason: HOSPADM

## 2018-10-02 RX ORDER — NICOTINE 21 MG/24HR
1 PATCH, TRANSDERMAL 24 HOURS TRANSDERMAL DAILY
Status: DISCONTINUED | OUTPATIENT
Start: 2018-10-03 | End: 2018-10-06 | Stop reason: HOSPADM

## 2018-10-02 RX ORDER — CLOMIPRAMINE HYDROCHLORIDE 25 MG/1
100 CAPSULE ORAL NIGHTLY
Status: DISCONTINUED | OUTPATIENT
Start: 2018-10-02 | End: 2018-10-03

## 2018-10-02 RX ORDER — RISPERIDONE 1 MG/1
1 TABLET, FILM COATED ORAL 2 TIMES DAILY
Status: DISCONTINUED | OUTPATIENT
Start: 2018-10-02 | End: 2018-10-06 | Stop reason: HOSPADM

## 2018-10-02 RX ORDER — BUSPIRONE HYDROCHLORIDE 5 MG/1
5 TABLET ORAL 2 TIMES DAILY
Status: DISCONTINUED | OUTPATIENT
Start: 2018-10-02 | End: 2018-10-06 | Stop reason: HOSPADM

## 2018-10-02 RX ORDER — SULFAMETHOXAZOLE AND TRIMETHOPRIM 800; 160 MG/1; MG/1
1 TABLET ORAL 2 TIMES DAILY
Status: ON HOLD | COMMUNITY
End: 2018-10-06 | Stop reason: HOSPADM

## 2018-10-02 RX ORDER — HYDROXYZINE PAMOATE 50 MG/1
50 CAPSULE ORAL 3 TIMES DAILY PRN
Status: ON HOLD | COMMUNITY
End: 2018-10-06

## 2018-10-02 RX ORDER — ACETAMINOPHEN 325 MG/1
650 TABLET ORAL EVERY 4 HOURS PRN
Status: DISCONTINUED | OUTPATIENT
Start: 2018-10-02 | End: 2018-10-06 | Stop reason: HOSPADM

## 2018-10-02 RX ORDER — LORAZEPAM 1 MG/1
2 TABLET ORAL ONCE
Status: COMPLETED | OUTPATIENT
Start: 2018-10-02 | End: 2018-10-02

## 2018-10-02 RX ORDER — BUSPIRONE HYDROCHLORIDE 5 MG/1
5 TABLET ORAL 2 TIMES DAILY
Status: ON HOLD | COMMUNITY
End: 2018-10-06

## 2018-10-02 RX ORDER — LORAZEPAM 2 MG/ML
1 INJECTION INTRAMUSCULAR ONCE
Status: DISCONTINUED | OUTPATIENT
Start: 2018-10-02 | End: 2018-10-06 | Stop reason: HOSPADM

## 2018-10-02 RX ORDER — SULFAMETHOXAZOLE AND TRIMETHOPRIM 800; 160 MG/1; MG/1
1 TABLET ORAL 2 TIMES DAILY
Status: DISCONTINUED | OUTPATIENT
Start: 2018-10-02 | End: 2018-10-06 | Stop reason: HOSPADM

## 2018-10-02 RX ADMIN — LORAZEPAM 2 MG: 1 TABLET ORAL at 15:26

## 2018-10-02 RX ADMIN — IOPAMIDOL 80 ML: 755 INJECTION, SOLUTION INTRAVENOUS at 17:31

## 2018-10-02 ASSESSMENT — SLEEP AND FATIGUE QUESTIONNAIRES
AVERAGE NUMBER OF SLEEP HOURS: 8
DO YOU HAVE DIFFICULTY SLEEPING: NO
DO YOU USE A SLEEP AID: NO

## 2018-10-02 ASSESSMENT — ENCOUNTER SYMPTOMS
SORE THROAT: 0
DIARRHEA: 0
COUGH: 0
PHOTOPHOBIA: 0
BACK PAIN: 0
NAUSEA: 0
BLOOD IN STOOL: 0
CHEST TIGHTNESS: 1
TROUBLE SWALLOWING: 0
SHORTNESS OF BREATH: 1
FACIAL SWELLING: 0
VOICE CHANGE: 0
ABDOMINAL PAIN: 0
VOMITING: 0
WHEEZING: 0

## 2018-10-02 ASSESSMENT — LIFESTYLE VARIABLES: HISTORY_ALCOHOL_USE: NO

## 2018-10-02 ASSESSMENT — PAIN DESCRIPTION - LOCATION
LOCATION: GENERALIZED
LOCATION: GENERALIZED

## 2018-10-02 ASSESSMENT — PAIN SCALES - GENERAL
PAINLEVEL_OUTOF10: 0
PAINLEVEL_OUTOF10: 10
PAINLEVEL_OUTOF10: 10
PAINLEVEL_OUTOF10: 0

## 2018-10-02 ASSESSMENT — PAIN DESCRIPTION - PAIN TYPE
TYPE: CHRONIC PAIN
TYPE: CHRONIC PAIN

## 2018-10-02 NOTE — ED PROVIDER NOTES
report electronically signed by Dr. Pantera Pagan on 10/2/2018 6:14 PM      XR CHEST STANDARD (2 VW)   Final Result   Normal chest. No acute findings. **This report has been created using voice recognition software. It may contain minor errors which are inherent in voice recognition technology. **      Final report electronically signed by Dr. Yoav Pak on 10/2/2018 3:03 PM          LABS:  Labs Reviewed   COMPREHENSIVE METABOLIC PANEL - Abnormal; Notable for the following:        Result Value    CO2 19 (*)     Total Bilirubin 0.2 (*)     ALT 9 (*)     All other components within normal limits   D-DIMER, QUANTITATIVE - Abnormal; Notable for the following:     D-Dimer, Quant 529.00 (*)     All other components within normal limits   GLOMERULAR FILTRATION RATE, ESTIMATED - Abnormal; Notable for the following:     Est, Glom Filt Rate 87 (*)     All other components within normal limits   URINE WITH REFLEXED MICRO - Abnormal; Notable for the following:     Character, Urine CLOUDY (*)     All other components within normal limits   CBC WITH AUTO DIFFERENTIAL   HCG, SERUM, QUALITATIVE   ANION GAP   OSMOLALITY       All other labs were within normal range or not returned as of this dictation. EMERGENCY DEPARTMENT COURSE and Medical Decision Making:     @[email or web address]@  [facility]/     Patient presents with shortness of breath and chest pain, as well as psychiatric issues. She is worked up in the emergency department and there is no medical reason to keep her from a psychiatric hospitalization at this time. ED Medications administered this visit:    Medications   LORazepam (ATIVAN) injection 1 mg (1 mg Intravenous Not Given 10/2/18 1523)   LORazepam (ATIVAN) tablet 2 mg (2 mg Oral Given 10/2/18 1526)   iopamidol (ISOVUE-370) 76 % injection 80 mL (80 mLs Intravenous Given 10/2/18 6933)       CONSULTS: (None if blank)  None    Procedures: (None if blank)       CLINICAL IMPRESSION      1. Anxiety state    2.  Chest

## 2018-10-03 PROCEDURE — 6370000000 HC RX 637 (ALT 250 FOR IP): Performed by: PSYCHIATRY & NEUROLOGY

## 2018-10-03 PROCEDURE — 99222 1ST HOSP IP/OBS MODERATE 55: CPT | Performed by: PSYCHIATRY & NEUROLOGY

## 2018-10-03 PROCEDURE — 1240000000 HC EMOTIONAL WELLNESS R&B

## 2018-10-03 RX ADMIN — RISPERIDONE 1 MG: 1 TABLET ORAL at 08:58

## 2018-10-03 RX ADMIN — TRAZODONE HYDROCHLORIDE 50 MG: 50 TABLET ORAL at 20:42

## 2018-10-03 RX ADMIN — RISPERIDONE 1 MG: 1 TABLET ORAL at 20:43

## 2018-10-03 RX ADMIN — SULFAMETHOXAZOLE AND TRIMETHOPRIM 1 TABLET: 800; 160 TABLET ORAL at 08:58

## 2018-10-03 RX ADMIN — SULFAMETHOXAZOLE AND TRIMETHOPRIM 1 TABLET: 800; 160 TABLET ORAL at 20:43

## 2018-10-03 RX ADMIN — BUSPIRONE HYDROCHLORIDE 5 MG: 5 TABLET ORAL at 08:58

## 2018-10-03 RX ADMIN — MULTIPLE VITAMINS W/ MINERALS TAB 1 TABLET: TAB at 08:58

## 2018-10-03 RX ADMIN — BUSPIRONE HYDROCHLORIDE 5 MG: 5 TABLET ORAL at 20:42

## 2018-10-03 RX ADMIN — HYDROXYZINE PAMOATE 50 MG: 50 CAPSULE ORAL at 17:37

## 2018-10-03 RX ADMIN — ACETAMINOPHEN 650 MG: 325 TABLET ORAL at 19:55

## 2018-10-03 ASSESSMENT — PAIN SCALES - GENERAL
PAINLEVEL_OUTOF10: 8
PAINLEVEL_OUTOF10: 8
PAINLEVEL_OUTOF10: 4
PAINLEVEL_OUTOF10: 6

## 2018-10-03 ASSESSMENT — PAIN DESCRIPTION - PAIN TYPE
TYPE: CHRONIC PAIN
TYPE: CHRONIC PAIN

## 2018-10-03 ASSESSMENT — PAIN DESCRIPTION - DESCRIPTORS: DESCRIPTORS: ACHING

## 2018-10-03 ASSESSMENT — PAIN DESCRIPTION - FREQUENCY: FREQUENCY: CONTINUOUS

## 2018-10-03 ASSESSMENT — PAIN DESCRIPTION - LOCATION
LOCATION: GENERALIZED
LOCATION: GENERALIZED

## 2018-10-03 ASSESSMENT — PAIN DESCRIPTION - PROGRESSION: CLINICAL_PROGRESSION: NOT CHANGED

## 2018-10-03 ASSESSMENT — PAIN DESCRIPTION - ONSET: ONSET: ON-GOING
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
## 2018-10-03 NOTE — PLAN OF CARE
Problem: Social interaction  Goal: Increased social interaction  Outcome: Not Met This Shift  Patient has not met his socialization goal for today as she has not attended any of the groups and has not been out for socialization with others. Patient will be encouraged to attend all groups and to come out of her room for social interaction with others daily during her hospital stay.

## 2018-10-04 PROCEDURE — 1240000000 HC EMOTIONAL WELLNESS R&B

## 2018-10-04 PROCEDURE — 6370000000 HC RX 637 (ALT 250 FOR IP): Performed by: PSYCHIATRY & NEUROLOGY

## 2018-10-04 PROCEDURE — 99232 SBSQ HOSP IP/OBS MODERATE 35: CPT | Performed by: PSYCHIATRY & NEUROLOGY

## 2018-10-04 RX ORDER — VENLAFAXINE HYDROCHLORIDE 37.5 MG/1
37.5 CAPSULE, EXTENDED RELEASE ORAL
Status: DISCONTINUED | OUTPATIENT
Start: 2018-10-04 | End: 2018-10-06 | Stop reason: HOSPADM

## 2018-10-04 RX ADMIN — HYDROXYZINE PAMOATE 50 MG: 50 CAPSULE ORAL at 09:21

## 2018-10-04 RX ADMIN — MULTIPLE VITAMINS W/ MINERALS TAB 1 TABLET: TAB at 08:07

## 2018-10-04 RX ADMIN — RISPERIDONE 1 MG: 1 TABLET ORAL at 20:27

## 2018-10-04 RX ADMIN — TRAZODONE HYDROCHLORIDE 50 MG: 50 TABLET ORAL at 20:26

## 2018-10-04 RX ADMIN — BUSPIRONE HYDROCHLORIDE 5 MG: 5 TABLET ORAL at 20:27

## 2018-10-04 RX ADMIN — SULFAMETHOXAZOLE AND TRIMETHOPRIM 1 TABLET: 800; 160 TABLET ORAL at 20:27

## 2018-10-04 RX ADMIN — ACETAMINOPHEN 650 MG: 325 TABLET ORAL at 08:07

## 2018-10-04 RX ADMIN — SULFAMETHOXAZOLE AND TRIMETHOPRIM 1 TABLET: 800; 160 TABLET ORAL at 08:07

## 2018-10-04 RX ADMIN — VENLAFAXINE HYDROCHLORIDE 37.5 MG: 37.5 CAPSULE, EXTENDED RELEASE ORAL at 11:00

## 2018-10-04 RX ADMIN — BUSPIRONE HYDROCHLORIDE 5 MG: 5 TABLET ORAL at 08:07

## 2018-10-04 RX ADMIN — RISPERIDONE 1 MG: 1 TABLET ORAL at 08:07

## 2018-10-04 ASSESSMENT — PAIN DESCRIPTION - PAIN TYPE: TYPE: CHRONIC PAIN

## 2018-10-04 ASSESSMENT — PAIN DESCRIPTION - LOCATION: LOCATION: GENERALIZED

## 2018-10-04 ASSESSMENT — PAIN SCALES - GENERAL
PAINLEVEL_OUTOF10: 6
PAINLEVEL_OUTOF10: 2
PAINLEVEL_OUTOF10: 6
PAINLEVEL_OUTOF10: 2

## 2018-10-04 ASSESSMENT — PAIN DESCRIPTION - PROGRESSION: CLINICAL_PROGRESSION: NOT CHANGED

## 2018-10-05 PROCEDURE — 1240000000 HC EMOTIONAL WELLNESS R&B

## 2018-10-05 PROCEDURE — 6370000000 HC RX 637 (ALT 250 FOR IP): Performed by: PSYCHIATRY & NEUROLOGY

## 2018-10-05 PROCEDURE — 99232 SBSQ HOSP IP/OBS MODERATE 35: CPT | Performed by: PSYCHIATRY & NEUROLOGY

## 2018-10-05 PROCEDURE — APPSS30 APP SPLIT SHARED TIME 16-30 MINUTES: Performed by: NURSE PRACTITIONER

## 2018-10-05 RX ADMIN — SULFAMETHOXAZOLE AND TRIMETHOPRIM 1 TABLET: 800; 160 TABLET ORAL at 08:02

## 2018-10-05 RX ADMIN — ACETAMINOPHEN 650 MG: 325 TABLET ORAL at 12:53

## 2018-10-05 RX ADMIN — RISPERIDONE 1 MG: 1 TABLET ORAL at 21:02

## 2018-10-05 RX ADMIN — BUSPIRONE HYDROCHLORIDE 5 MG: 5 TABLET ORAL at 08:02

## 2018-10-05 RX ADMIN — VENLAFAXINE HYDROCHLORIDE 37.5 MG: 37.5 CAPSULE, EXTENDED RELEASE ORAL at 08:02

## 2018-10-05 RX ADMIN — TRAZODONE HYDROCHLORIDE 50 MG: 50 TABLET ORAL at 21:02

## 2018-10-05 RX ADMIN — RISPERIDONE 1 MG: 1 TABLET ORAL at 08:02

## 2018-10-05 RX ADMIN — SULFAMETHOXAZOLE AND TRIMETHOPRIM 1 TABLET: 800; 160 TABLET ORAL at 21:02

## 2018-10-05 RX ADMIN — BUSPIRONE HYDROCHLORIDE 5 MG: 5 TABLET ORAL at 21:02

## 2018-10-05 RX ADMIN — MULTIPLE VITAMINS W/ MINERALS TAB 1 TABLET: TAB at 08:02

## 2018-10-05 ASSESSMENT — PAIN SCALES - GENERAL
PAINLEVEL_OUTOF10: 0
PAINLEVEL_OUTOF10: 4

## 2018-10-05 NOTE — PROGRESS NOTES
Group therapy 1330-Pt was encouraged to attend group but did not.
Pt. Refused to attend Recreation group, 1:1 offered and refused.
Spoke with Henry Rodriguez from [place] and Antonio from . Pt has been accepted to  for admission by Dr. Mitra Sun pending medical clearance. AMAYA does not need to do an assessment.
Daily Sleep (WDL): Within Defined Limits         Patient Currently in Pain: Denies  Daily Nutrition (WDL): Exceptions to WDL  Appetite Change: Decreased  Barriers to Nutrition: None  Level of Assistance: Independent/Self    Patient Monitoring:  Frequency of Checks: 4 times per hour, close    Psychiatric Symptoms:   Depression Symptoms  Depression Symptoms: Loss of interest, Change in energy level, Isolative  Anxiety Symptoms  Anxiety Symptoms: Generalized  Aliyah Symptoms  Aliyah Symptoms: No problems reported or observed. Psychosis Symptoms  Delusion Type: No problems reported or observed. (none expressed)    Suicide Risk CSSR-S:  1) Within the past month, have you wished you were dead or wished you could go to sleep and not wake up? : NO  2) Within the past month, have you actually had any thoughts of killing yourself? : NO  6)  Have you ever done anything, started to do anything, or prepared to do anything to end your life?: NO        EDUCATION:   Learner Progress Toward Treatment Goals: Reviewed results and recommendations of this team, Reviewed group plan and strategies, Reviewed signs, symptoms and risk of self harm and violent behavior and Reviewed goals and plan of care    Method: Small group    Outcome: Verbalized understanding and Demonstrated Understanding    PATIENT GOALS: Daily    PLAN/TREATMENT RECOMMENDATIONS UPDATE:  1. How are you progressing toward meeting your main treatment goal? Pt voiced improvement  2. Are there discharge barriers/lingering problems that need to be addressed? Pt is active with [facility]. 3.  Do you have the ability to pay for your medications? BCBS       4. How is your group participation? Not attending.  Pt encouraged to attend    GOALS UPDATE:   Time frame for Short-Term Goals: Daily      Milana Lab, LSW

## 2018-10-06 VITALS
HEIGHT: 62 IN | SYSTOLIC BLOOD PRESSURE: 124 MMHG | HEART RATE: 77 BPM | DIASTOLIC BLOOD PRESSURE: 66 MMHG | BODY MASS INDEX: 29.44 KG/M2 | OXYGEN SATURATION: 98 % | WEIGHT: 160 LBS | RESPIRATION RATE: 16 BRPM | TEMPERATURE: 97.9 F

## 2018-10-06 PROCEDURE — 99239 HOSP IP/OBS DSCHRG MGMT >30: CPT | Performed by: PSYCHIATRY & NEUROLOGY

## 2018-10-06 PROCEDURE — 6370000000 HC RX 637 (ALT 250 FOR IP): Performed by: PSYCHIATRY & NEUROLOGY

## 2018-10-06 PROCEDURE — 5130000000 HC BRIDGE APPOINTMENT

## 2018-10-06 RX ORDER — BUSPIRONE HYDROCHLORIDE 5 MG/1
5 TABLET ORAL 2 TIMES DAILY
Qty: 30 TABLET | Refills: 0 | Status: SHIPPED | OUTPATIENT
Start: 2018-10-06 | End: 2020-11-02 | Stop reason: ALTCHOICE

## 2018-10-06 RX ORDER — HYDROXYZINE PAMOATE 50 MG/1
50 CAPSULE ORAL 3 TIMES DAILY PRN
Qty: 30 CAPSULE | Refills: 0 | Status: SHIPPED | OUTPATIENT
Start: 2018-10-06 | End: 2019-04-12

## 2018-10-06 RX ORDER — RISPERIDONE 1 MG/1
1 TABLET, FILM COATED ORAL 2 TIMES DAILY
Qty: 7 TABLET | Refills: 0 | Status: SHIPPED | OUTPATIENT
Start: 2018-10-06 | End: 2019-12-29 | Stop reason: ALTCHOICE

## 2018-10-06 RX ORDER — VENLAFAXINE HYDROCHLORIDE 37.5 MG/1
37.5 CAPSULE, EXTENDED RELEASE ORAL
Qty: 30 CAPSULE | Refills: 0 | Status: SHIPPED | OUTPATIENT
Start: 2018-10-07 | End: 2021-01-20 | Stop reason: ALTCHOICE

## 2018-10-06 RX ADMIN — VENLAFAXINE HYDROCHLORIDE 37.5 MG: 37.5 CAPSULE, EXTENDED RELEASE ORAL at 08:26

## 2018-10-06 RX ADMIN — MULTIPLE VITAMINS W/ MINERALS TAB 1 TABLET: TAB at 08:26

## 2018-10-06 RX ADMIN — SULFAMETHOXAZOLE AND TRIMETHOPRIM 1 TABLET: 800; 160 TABLET ORAL at 08:26

## 2018-10-06 RX ADMIN — RISPERIDONE 1 MG: 1 TABLET ORAL at 08:26

## 2018-10-06 RX ADMIN — BUSPIRONE HYDROCHLORIDE 5 MG: 5 TABLET ORAL at 08:26

## 2018-10-06 ASSESSMENT — PAIN SCALES - GENERAL: PAINLEVEL_OUTOF10: 0

## 2018-10-06 NOTE — BH NOTE
Behavioral Health   Discharge Note    Pt discharged with followings belongings:   Dentures: None  Vision - Corrective Lenses: None  Hearing Aid: None  Jewelry: Earrings, Necklace  Body Piercings Removed: N/A  Clothing: Pants (hoodie)  Were All Patient Medications Collected?: Yes  Other Valuables: None   Valuables sent home with patient. Valuables retrieved from safe, Security envelope number:  [id number] and returned to patient. Patient left department with family member Iris Simental   via ambulatory  , discharged to  . \"An Important Message from [employer] About Your Rights\" form reviewed, signed yes. Patient education on aftercare instructions: yes  Bridge Appointment completed: Reviewed Discharge Instructions with patient. Patient verbalizes understanding and agreement with the discharge plan using the teachback method. Patient verbalize understanding of AVS:  yes.     Status EXAM upon discharge:  Status and Exam  Normal: No  Facial Expression: Flat, Brightened (brightens on interaction)  Affect: Blunt, Constricted, Normal, Stable (does brighten)  Level of Consciousness: Alert  Mood:Normal: No  Mood: Anxious (for pending discharge)  Motor Activity:Normal: No  Motor Activity: Decreased  Interview Behavior: Cooperative, Evasive  Preception: [place] to Person, [place] to Time, [place] to Place, [place] to Situation  Attention:Normal: Yes  Attention: Distractible  Thought Processes: Circumstantial  Thought Content:Normal: Yes  Thought Content: Poverty of Content  Hallucinations: None  Delusions: No  Memory:Normal: No  Memory: Poor Recent  Insight and Judgment: Yes  Insight and Judgment:  (limited and partial )  Present Suicidal Ideation: No  Present Homicidal Ideation: No    E JUAN CARLOS VELA RN
Patient declined to attend evening group.
None     Admission order obtained yes. No valuables. Patient's home medications were locked in narc cabinet. Patient oriented to surroundings and program expectations and copy of patient rights given. Received admission packet:  yes. Consents reviewed, signed yes. Patient verbalize understanding:  yes. Patient education on precautions: yes           Provided pt with [employer] handout entitled \"Quitting Smoking. \"  Reviewed handout with pt addressing dangers of smoking, developing coping skills, and providing basic information about quitting. Pt response to counseling:  Pt does not want to stop smoking    19 yo female pt admitted from ED, voluntary admit. Pt states she is here for panic attacks. Pt denies suicidal/homicidal thoughts, denies hallucinations, no paranoia or delusions voiced. Pt has poor to fair eye contact, speech is very soft and slow to respond, poor concentration. Pt states she is homeless, wants to go to the [facility] at discharge. Pt states she has been taking her medications, pt had medications with her, pt is on bactrim for recent UTI, pt denies any symptoms of UTI at present. Pt oriented to the unit and her room.          Malena Hi RN

## 2018-10-08 ENCOUNTER — TELEPHONE (OUTPATIENT)
Dept: ADMINISTRATIVE | Age: 23
End: 2018-10-08

## 2019-04-12 ENCOUNTER — HOSPITAL ENCOUNTER (EMERGENCY)
Age: 24
Discharge: HOME | End: 2019-04-12
Attending: EMERGENCY MEDICINE
Payer: MEDICARE

## 2019-04-12 VITALS
HEIGHT: 62 IN | HEART RATE: 107 BPM | OXYGEN SATURATION: 96 % | TEMPERATURE: 98.2 F | WEIGHT: 200 LBS | RESPIRATION RATE: 14 BRPM | SYSTOLIC BLOOD PRESSURE: 119 MMHG | DIASTOLIC BLOOD PRESSURE: 66 MMHG | BODY MASS INDEX: 36.8 KG/M2

## 2019-04-12 DIAGNOSIS — Z86.59 HISTORY OF SCHIZOPHRENIA: ICD-10-CM

## 2019-04-12 DIAGNOSIS — F41.9 ANXIETY DISORDER, UNSPECIFIED TYPE: Primary | ICD-10-CM

## 2019-04-12 PROCEDURE — 99284 EMERGENCY DEPT VISIT MOD MDM: CPT

## 2019-04-12 ASSESSMENT — ENCOUNTER SYMPTOMS
ABDOMINAL PAIN: 0
EYE DISCHARGE: 0
DIARRHEA: 0
RHINORRHEA: 0
SHORTNESS OF BREATH: 0
COUGH: 0
EYE PAIN: 0
NAUSEA: 0
WHEEZING: 0
VOMITING: 0
BACK PAIN: 0
SORE THROAT: 0

## 2019-04-12 ASSESSMENT — SLEEP AND FATIGUE QUESTIONNAIRES
DIFFICULTY FALLING ASLEEP: NO
DO YOU USE A SLEEP AID: YES
RESTFUL SLEEP: NO
DIFFICULTY STAYING ASLEEP: YES
AVERAGE NUMBER OF SLEEP HOURS: 7
DO YOU HAVE DIFFICULTY SLEEPING: YES
SLEEP PATTERN: RESTLESSNESS
DIFFICULTY ARISING: NO

## 2019-04-12 ASSESSMENT — LIFESTYLE VARIABLES: HISTORY_ALCOHOL_USE: NO

## 2019-04-12 NOTE — PROGRESS NOTES
Provisional Diagnosis: Anxiety Disorder     Risk, Psychosocial and Contextual Factors:  Pt reportedly is \"happy\", no precipitating factors identified     Current  Treatment:   [facility]       Present Suicidal Behavior:      Verbal:  Denies         Attempt:          Denies     Access to Weapons:   Denies     Current Suicide Risk: Low, Moderate or High:    Low     Past Suicidal Behavior:       Verbal:  Denies     Attempt:  Denies     Self-Injurious/Self-Mutilation:   Denies     Traumatic Event Within Past 2 Weeks:    Denies     Current Abuse:  Denies     Legal:  Denies     Violence:  Denies     Protective Factors:  Pt lives at the [facility], linked to outpatient mental health services with [address]:    [place]     Does Patient have an Insulin Pump? No     Clinical Summary:      Pt is a 30year old female escorted to [facility] by EMS due to anxiety. Pt lives at the [facility] provided by [facility]. Pt report increased anxiety \"I can't focus, I'm in and out of the house, I'm smoking more cigarettes\". No precipitating factors identified. Pt reportedly receive the invega shot \"for anxiety\" as prescribed by Dr. Junito [facility] Psychiatrist). Pt also receives counseling at [facility]. Pt denies current suicidal/homicidal thought, denies hallucinations, no delusions noted. Pt has a history of inpatient psychiatric treatment at [facility] on 10/2/18 and admissions to the [place] [address]. Pt reportedly has been diagnosed with schizophrenia, ADD, ADHD and bipolar disorder. Pt denies a legal history, denies drug/alcohol use, denies violence, denies access to weapons. Pt denies symptoms of depression \"I'm happy and looking forward to the future\". Pt is oriented x4, good insight/judgment, linear thought, good eye contact, cooperative. Level of Care Disposition:      Pt medically cleared by Dr. Nupur Moya. Clinician consulted with Dr. Tali Mata who recommend pt follow up with [facility]. Pt and Dr. Nupur Moya updated.

## 2019-04-12 NOTE — ED NOTES
Bed: 039A  Expected date: 4/12/19  Expected time: 6:20 PM  Means of arrival: [facility]  Comments:     Silvina Moss RN  04/12/19 8453

## 2019-04-12 NOTE — ED TRIAGE NOTES
Pt comes to the ED with anxiety. Pt was recently dx with bipolar and schizophrenia. Pt is not taking medications for these. Pt comes today because she's feeling anxious and wants to be evaluated.

## 2019-04-12 NOTE — ED PROVIDER NOTES
[id number] [place] [address]   eMERGENCY dEPARTMENT eNCOUnter        CHIEF COMPLAINT    Chief Complaint   Patient presents with   María Elena Salas Anxiety       Nurses Notes reviewed and I agree except as noted in the HPI. HPI    Domo Bar is a 30 y.o. female who presents for evaluation of schizophrenia. The patient stated that she was diagnosed with schizophrenia about 4 months ago by Dr. Nabeel Hong (psychiatry) but she stated that she was not put on any medication for it. She stated that she wanted to get a second opinion and wanted to speak to [facility] AN AFFILIATE OF [facility]. She stated that she has had paranoia her whole life and that she can feel things about peoples personalities. She also reported that she feels like people are out to get her. She denies visual or auditory hallucination, suicidal ideation and homicidal ideation. She stated that she is not working right now and that she lives in a group home at [facility]. She has been there for about 4 months now. 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 She is currently using invega injection to manage her anxiety which she stated has helped. The patient denies any other symptoms or relevant history at this time. REVIEW OF SYSTEMS    Review of Systems   Constitutional: Negative for appetite change, chills, fatigue and fever. HENT: Negative for congestion, ear pain, rhinorrhea and sore throat. Eyes: Negative for pain, discharge and visual disturbance. Respiratory: Negative for cough, shortness of breath and wheezing. Cardiovascular: Negative for chest pain, palpitations and leg swelling. Gastrointestinal: Negative for abdominal pain, diarrhea, nausea and vomiting. Genitourinary: Negative for difficulty urinating, dysuria, hematuria and vaginal discharge. Musculoskeletal: Negative for arthralgias, back pain, joint swelling and neck pain. Skin: Negative for pallor and rash. Neurological: Negative for dizziness, syncope, weakness, light-headedness, numbness and headaches. Hematological: Negative for adenopathy. Psychiatric/Behavioral: Negative for confusion and suicidal ideas. The patient is not nervous/anxious. History of schizophrenia, wanted second opinion        PAST MEDICAL HISTORY     has a past medical history of Arthritis, Bipolar 1 disorder ([facility] [address] Diabetes mellitus ([facility] [address] and Fibromyalgia. SURGICAL HISTORY   has a past surgical history that includes [place] tooth extraction. CURRENT MEDICATIONS    Discharge Medication List as of 4/12/2019  8:03 PM      CONTINUE these medications which have NOT CHANGED    Details   busPIRone (BUSPAR) 5 MG tablet Take 1 tablet by mouth 2 times daily, Disp-30 tablet, R-0Normal      venlafaxine (EFFEXOR XR) 37.5 MG extended release capsule Take 1 capsule by mouth daily (with breakfast), Disp-30 capsule, R-0Normal      risperiDONE (RISPERDAL) 1 MG tablet Take 1 tablet by mouth 2 times daily Stop taking this medication after the second dose of Invega sustenna, Disp-7 tablet, R-0Normal      Multiple Vitamins-Minerals (WOMENS DAILY FORMULA PO) Take by mouthHistorical Med             ALLERGIES    has No Known Allergies. FAMILY HISTORY    indicated that her mother is alive. She indicated that her father is alive. family history is not on file. SOCIAL HISTORY     reports that she has been smoking cigarettes. She has been smoking about 1.00 pack per day. She has never used smokeless tobacco. She reports that she drinks alcohol. She reports that she does not use drugs. PHYSICAL EXAM      INITIAL VITALS: /66   Pulse 107   Temp 98.2 °F (36.8 °C) (Oral)   Resp 14   Ht 5' 2\" (1.575 m)   Wt 200 lb (90.7 kg)   SpO2 96%   BMI 36.58 kg/m² Estimated body mass index is 36.58 kg/m² as calculated from the following:    Height as of this encounter: 5' 2\" (1.575 m). Weight as of this encounter: 200 lb (90.7 kg).     Physical Exam   Constitutional: She is oriented to person, place, and time. She appears well-developed and well-nourished. Non-toxic appearance. HENT:   Head: Normocephalic and atraumatic. Right Ear: Tympanic membrane and external ear normal.   Left Ear: Tympanic membrane and external ear normal.   Nose: Nose normal.   Mouth/Throat: Oropharynx is clear and moist and mucous membranes are normal. No oropharyngeal exudate, posterior oropharyngeal edema or posterior oropharyngeal erythema. Eyes: Conjunctivae and EOM are normal.   Neck: Normal range of motion. Neck supple. No JVD present. Cardiovascular: Normal rate, regular rhythm, normal heart sounds, intact distal pulses and normal pulses. Exam reveals no gallop and no friction rub. No murmur heard. Pulmonary/Chest: Effort normal and breath sounds normal. No respiratory distress. She has no decreased breath sounds. She has no wheezes. She has no rhonchi. She has no rales. Abdominal: Soft. Bowel sounds are normal. She exhibits no distension. There is no tenderness. There is no rebound, no guarding and no CVA tenderness. Musculoskeletal: Normal range of motion. She exhibits no edema. Neurological: She is alert and oriented to person, place, and time. She exhibits normal muscle tone. Coordination normal.   Skin: Skin is warm and dry. No rash noted. She is not diaphoretic. Nursing note and vitals reviewed. MEDICAL DECISION MAKING    DIFFERENTIAL DIAGNOSIS:  Including but not limited to schizophrenia, bipolar, anxiety       DIAGNOSTIC RESULTS      Vitals:    Vitals:    04/12/19 1829   BP: 119/66   Pulse: 107   Resp: 14   Temp: 98.2 °F (36.8 °C)   TempSrc: Oral   SpO2: 96%   Weight: 200 lb (90.7 kg)   Height: 5' 2\" (1.575 m)       EMERGENCY DEPARTMENT COURSE:    Medications - No data to display    The pt was seen and evaluated by me. Within the department, I observed the pt's vital signs to be within acceptable range.  I observed the pt's condition to remain hemodynamically stable during the duration of their stay. I explained my proposed course of treatmentto the pt, and they were amenable to my decision. They were discharged home, and they will return to the ED if their symptoms become more severe in nature, or otherwise change. CRITICAL CARE:   None. CONSULTS:  AMAYA recommend patients to be discharged    PROCEDURES:  None. FINAL IMPRESSION       1. Anxiety disorder, unspecified type    2. History of schizophrenia          DISPOSITION/PLAN  PATIENT REFERRED TO:  Kandi Kent MD  [address]  [phone]    Schedule an appointment as soon as possible for a visit       DISCHARGE MEDICATIONS:  Discharge Medication List as of 4/12/2019  8:03 PM          (Please note that portions of this note were completed with a voice recognition program.  Efforts were made to edit the dictations but occasionallywords are mis-transcribed.)      Scribe:  Fadi Fleming 04/19/19 7:36 PM Scribing for and in thepresence of Omega Suggs M.D. Signed by: Jeanne Powell, 04/19/19 8:40 PM    Provider:  I personally performed the services described in the documentation, reviewed and edited the documentation which was dictated to the scribe in my presence, and it accurately records my words andactions.      04/19/19 8:40 PM      Dimitrios Ly MD      Emergency room physician             Dimitrios Ly MD  04/19/19 2401

## 2019-07-09 ENCOUNTER — OFFICE VISIT (OUTPATIENT)
Dept: INTERNAL MEDICINE CLINIC | Age: 24
End: 2019-07-09
Payer: MEDICAID

## 2019-07-09 ENCOUNTER — TELEPHONE (OUTPATIENT)
Dept: INTERNAL MEDICINE CLINIC | Age: 24
End: 2019-07-09

## 2019-07-09 VITALS — BODY MASS INDEX: 42.99 KG/M2 | HEIGHT: 62 IN | WEIGHT: 233.6 LBS

## 2019-07-09 DIAGNOSIS — E66.01 CLASS 3 SEVERE OBESITY IN ADULT, UNSPECIFIED BMI, UNSPECIFIED OBESITY TYPE, UNSPECIFIED WHETHER SERIOUS COMORBIDITY PRESENT (HCC): ICD-10-CM

## 2019-07-09 PROCEDURE — 97802 MEDICAL NUTRITION INDIV IN: CPT | Performed by: DIETITIAN, REGISTERED

## 2019-07-09 NOTE — PATIENT INSTRUCTIONS
1.)  Start to eat 3 meals/day and ok to have a healthy evening snack each evening.    2.)  Drink water most of the time and sugar free drinks are ok too.    3.)  Begin counting your carbohydrates that you are eating.  - Aim for 3-4 carb servings (=45 - 60 gms carbohydrates) at each meal.  Plus add protein with each meal and snack    4.)  Add non-starchy veggies anytime with meals and snacks    5.)  Healthy carb choices:  Whole grain bread, whole wheat pasta, starchy veggies and fresh fruit and low fat milk/yogurt    Awesome - good idea to ride your bike to the [facility]. Bring a 1-2 week food log to next dietitian appt.

## 2019-07-09 NOTE — LETTER
[address] [facility] Internal Medicine  [place] [address]  [address]  [address] [address]  Phone: [phone]  Fax: [phone]    Lan Oppenheim, MD        July 9, 2019       Patient: Carlos Alberto Díaz   MR Number: [id number]   YOB: 1995   Date of Visit: 7/9/2019       Dear Dr. Kolby Ray:    Thank you for the request for consultation for Hussein Mckoy to me for the evaluation of Obesity, other endocrine/metabolic disorder. Below are the relevant portions of my assessment and plan of care. Assessment:     Vitals from current and previous visits:  Ht 5' 2\" (1.575 m)   Wt 233 lb 9.6 oz (106 kg)   BMI 42.73 kg/m²      -Body mass index is 42.73 kg/m². Greater than 40 - Morbid Obesity / Extreme Obesity / Grade III. -Weight goal: lose weight. Nutrition Diagnosis:   Overweight/Obesity related to Disordered eating pattern as evidenced by Food recall. Plan:     Intervention:  -Impression: Pt states she was recently karyn with schizophrenia. Pt is quiet but nice and answers yes/no questions with \"yes Ma am\" and \"No Ma am\"  Pt seems positive about the guidelines given today and verified she understands the meal plan presented. She says she likes to measure and count her food. Pt states she has trouble sleeping and staying asleep, so has been getting up from bed in the middle of the night and eating, which she states she is not hungry in the morning. Pt states she is going to talk to her doctor about not being able to sleep at night and staying asleep.   Pt states she is not a picky eater.    -Instructed the patient on: Carbohydrate Counting, Consistent Carbohydrate Intake, Food Label Reading, Meal Planning for Regular, Balanced Meals & Snacks and The Importance of Regular Physical Activity, the rationale of eating 3 meals/day to increase metabolism, healthy carb choices    -Handouts given for: meal planning booklet, fast food dining - healthier options, food logging, healthy snacks, 150 gm 3 meal sample menus and brkf ideas. .    Patient Instructions   1.)  Start to eat 3 meals/day and ok to have a healthy evening snack each evening.    2.)  Drink water most of the time and sugar free drinks are ok too.    3.)  Begin counting your carbohydrates that you are eating.  - Aim for 3-4 carb servings (=45 - 60 gms carbohydrates) at each meal.  Plus add protein with each meal and snack    4.)  Add non-starchy veggies anytime with meals and snacks    5.)  Healthy carb choices:  Whole grain bread, whole wheat pasta, starchy veggies and fresh fruit and low fat milk/yogurt    Awesome - good idea to ride your bike to the [facility]. Bring a 1-2 week food log to next dietitian appt. -General Diet Recommendations: minimize simple sugars, increase fiber intake and carb counting and balanced meal planning, healthy snacks.  -Nutrition prescription: 1500 - 1600 calories/day, 150 - 165 g carbs/day. <50 gms fat/day   Adj wt. 67 kg (141#)    Comprehension verified using teachback method. Monitoring/Evaluation:   -Followup visit: 6 weeks with dietitian.   -Receptiveness to education/goals: Agreeable.  -Evaluation of education: Indicates understanding.  -Readiness to change: contemplation - ambivalent about change eating 3 meals/day. -Expected compliance: good. Thank you for your referral of this patient. If you have questions, please do not hesitate to call me. I look forward to following Deyanira Ledesma along with you.     Sincerely,        Yudy Modi RD, LD    CC providers:  Ignacia Huang MD  [address]: [phone]

## 2019-07-09 NOTE — PROGRESS NOTES
Vitamins-Minerals (WOMENS DAILY FORMULA PO) Take by mouth       No current facility-administered medications on file prior to visit. Vitals from current and previous visits:  Ht 5' 2\" (1.575 m)   Wt 233 lb 9.6 oz (106 kg)   BMI 42.73 kg/m²     -Body mass index is 42.73 kg/m². Greater than 40 - Morbid Obesity / Extreme Obesity / Grade III. -Weight goal: lose weight. Nutrition Diagnosis:   Overweight/Obesity related to Disordered eating pattern as evidenced by Food recall. Intervention:  -Impression: Pt states she was recently karyn with schizophrenia. Pt is quiet but nice and answers yes/no questions with \"yes Ma am\" and \"No Ma am\"  Pt seems positive about the guidelines given today and verified she understands the meal plan presented. She says she likes to measure and count her food. Pt states she has trouble sleeping and staying asleep, so has been getting up from bed in the middle of the night and eating, which she states she is not hungry in the morning. Pt states she is going to talk to her doctor about not being able to sleep at night and staying asleep. Pt states she is not a picky eater.    -Instructed the patient on: Carbohydrate Counting, Consistent Carbohydrate Intake, Food Label Reading, Meal Planning for Regular, Balanced Meals & Snacks and The Importance of Regular Physical Activity, the rationale of eating 3 meals/day to increase metabolism, healthy carb choices    -Handouts given for: meal planning booklet, fast food dining - healthier options, food logging, healthy snacks, 150 gm 3 meal sample menus and brkf ideas. .    Patient Instructions   1.)  Start to eat 3 meals/day and ok to have a healthy evening snack each evening.    2.)  Drink water most of the time and sugar free drinks are ok too.    3.)  Begin counting your carbohydrates that you are eating.  - Aim for 3-4 carb servings (=45 - 60 gms carbohydrates) at each meal.  Plus add protein with each meal and snack    4.) Add non-starchy veggies anytime with meals and snacks    5.)  Healthy carb choices:  Whole grain bread, whole wheat pasta, starchy veggies and fresh fruit and low fat milk/yogurt    Awesome - good idea to ride your bike to the [facility]. Bring a 1-2 week food log to next dietitian appt. -General Diet Recommendations: minimize simple sugars, increase fiber intake and carb counting and balanced meal planning, healthy snacks.  -Nutrition prescription: 1500 - 1600 calories/day, 150 - 165 g carbs/day. <50 gms fat/day   Adj wt. 67 kg (141#)    Comprehension verified using teachback method. Monitoring/Evaluation:   -Followup visit: 6 weeks with dietitian.   -Receptiveness to education/goals: Agreeable.  -Evaluation of education: Indicates understanding.  -Readiness to change: contemplation - ambivalent about change eating 3 meals/day. -Expected compliance: good. Thank you for your referral of this patient. Total time involved in direct patient education: 45 minutes for initial MNT visit.

## 2019-07-16 ENCOUNTER — HOSPITAL ENCOUNTER (OUTPATIENT)
Dept: GENERAL RADIOLOGY | Age: 24
Discharge: HOME | End: 2019-07-16
Payer: MEDICAID

## 2019-07-16 ENCOUNTER — HOSPITAL ENCOUNTER (OUTPATIENT)
Age: 24
Discharge: HOME | End: 2019-07-16
Payer: MEDICAID

## 2019-07-16 DIAGNOSIS — R20.2 PARESTHESIA OF BOTH LEGS: ICD-10-CM

## 2019-07-16 PROCEDURE — 72100 X-RAY EXAM L-S SPINE 2/3 VWS: CPT

## 2019-12-29 ENCOUNTER — HOSPITAL ENCOUNTER (EMERGENCY)
Age: 24
Discharge: HOME | End: 2019-12-29
Payer: MEDICAID

## 2019-12-29 ENCOUNTER — APPOINTMENT (OUTPATIENT)
Dept: GENERAL RADIOLOGY | Age: 24
End: 2019-12-29
Payer: MEDICAID

## 2019-12-29 VITALS
HEIGHT: 62 IN | DIASTOLIC BLOOD PRESSURE: 69 MMHG | RESPIRATION RATE: 18 BRPM | BODY MASS INDEX: 46.01 KG/M2 | WEIGHT: 250 LBS | OXYGEN SATURATION: 95 % | HEART RATE: 90 BPM | TEMPERATURE: 98.5 F | SYSTOLIC BLOOD PRESSURE: 118 MMHG

## 2019-12-29 LAB
EKG ATRIAL RATE: 96 BPM
EKG P AXIS: 67 DEGREES
EKG P-R INTERVAL: 132 MS
EKG Q-T INTERVAL: 330 MS
EKG QRS DURATION: 82 MS
EKG QTC CALCULATION (BAZETT): 416 MS
EKG R AXIS: 30 DEGREES
EKG T AXIS: 39 DEGREES
EKG VENTRICULAR RATE: 96 BPM
FLU A ANTIGEN: NEGATIVE
FLU B ANTIGEN: NEGATIVE

## 2019-12-29 PROCEDURE — 6360000002 HC RX W HCPCS: Performed by: PHYSICIAN ASSISTANT

## 2019-12-29 PROCEDURE — 87804 INFLUENZA ASSAY W/OPTIC: CPT

## 2019-12-29 PROCEDURE — 93005 ELECTROCARDIOGRAM TRACING: CPT | Performed by: PHYSICIAN ASSISTANT

## 2019-12-29 PROCEDURE — 71046 X-RAY EXAM CHEST 2 VIEWS: CPT

## 2019-12-29 PROCEDURE — 94640 AIRWAY INHALATION TREATMENT: CPT

## 2019-12-29 PROCEDURE — 99285 EMERGENCY DEPT VISIT HI MDM: CPT

## 2019-12-29 PROCEDURE — 6370000000 HC RX 637 (ALT 250 FOR IP): Performed by: PHYSICIAN ASSISTANT

## 2019-12-29 RX ORDER — NAPROXEN 500 MG/1
500 TABLET ORAL 2 TIMES DAILY
Qty: 60 TABLET | Refills: 0 | Status: SHIPPED | OUTPATIENT
Start: 2019-12-29 | End: 2020-11-02 | Stop reason: ALTCHOICE

## 2019-12-29 RX ORDER — AZITHROMYCIN 250 MG/1
250 TABLET, FILM COATED ORAL DAILY
Qty: 4 TABLET | Refills: 0 | Status: SHIPPED | OUTPATIENT
Start: 2019-12-29 | End: 2020-01-02

## 2019-12-29 RX ORDER — ALBUTEROL SULFATE 90 UG/1
2 AEROSOL, METERED RESPIRATORY (INHALATION) EVERY 4 HOURS PRN
Qty: 1 INHALER | Refills: 0 | Status: SHIPPED | OUTPATIENT
Start: 2019-12-29 | End: 2020-11-02 | Stop reason: SDUPTHER

## 2019-12-29 RX ORDER — AZITHROMYCIN 250 MG/1
500 TABLET, FILM COATED ORAL ONCE
Status: COMPLETED | OUTPATIENT
Start: 2019-12-29 | End: 2019-12-29

## 2019-12-29 RX ORDER — DEXTROMETHORPHAN HYDROBROMIDE AND PROMETHAZINE HYDROCHLORIDE 15; 6.25 MG/5ML; MG/5ML
5 SYRUP ORAL 4 TIMES DAILY PRN
Qty: 180 ML | Refills: 0 | Status: SHIPPED | OUTPATIENT
Start: 2019-12-29 | End: 2020-01-05

## 2019-12-29 RX ADMIN — ALBUTEROL SULFATE 2.5 MG: 2.5 SOLUTION RESPIRATORY (INHALATION) at 12:03

## 2019-12-29 RX ADMIN — ALBUTEROL SULFATE 2.5 MG: 2.5 SOLUTION RESPIRATORY (INHALATION) at 10:05

## 2019-12-29 RX ADMIN — AZITHROMYCIN 500 MG: 250 TABLET, FILM COATED ORAL at 11:29

## 2019-12-29 ASSESSMENT — PAIN SCALES - GENERAL: PAINLEVEL_OUTOF10: 6

## 2019-12-29 ASSESSMENT — PAIN DESCRIPTION - FREQUENCY: FREQUENCY: CONTINUOUS

## 2019-12-29 ASSESSMENT — PAIN DESCRIPTION - PAIN TYPE: TYPE: ACUTE PAIN

## 2019-12-29 ASSESSMENT — PAIN DESCRIPTION - PROGRESSION: CLINICAL_PROGRESSION: GRADUALLY WORSENING

## 2019-12-29 ASSESSMENT — PAIN DESCRIPTION - DESCRIPTORS: DESCRIPTORS: TIGHTNESS

## 2019-12-29 ASSESSMENT — PAIN DESCRIPTION - ONSET: ONSET: GRADUAL

## 2019-12-29 ASSESSMENT — PAIN DESCRIPTION - ORIENTATION: ORIENTATION: MID

## 2019-12-29 ASSESSMENT — PAIN DESCRIPTION - LOCATION: LOCATION: CHEST

## 2019-12-29 NOTE — ED NOTES
Bed: 029A  Expected date: 12/29/19  Expected time: 9:33 AM  Means of arrival: [facility] [address] Dept  Comments:     Pilo Duke RN  12/29/19 9350

## 2019-12-29 NOTE — ED PROVIDER NOTES
remains stable here in the emergency department. Patient's laboratory values, imaging studies and physical examination findings were reviewed and were concerning for pnemonia    Will give azithromycin 500mg in ER with Rx for completion  . Labs Reviewed   RAPID INFLUENZA A/B ANTIGENS     Medications   albuterol (PROVENTIL) nebulizer solution 2.5 mg (has no administration in time range)   azithromycin (ZITHROMAX) tablet 500 mg (has no administration in time range)   albuterol (PROVENTIL) nebulizer solution 2.5 mg (2.5 mg Nebulization Given 12/29/19 1005)     New Prescriptions    ALBUTEROL SULFATE HFA (PROVENTIL HFA) 108 (90 BASE) MCG/ACT INHALER    Inhale 2 puffs into the lungs every 4 hours as needed for Wheezing or Shortness of Breath (Space out to every 6 hours as symptoms improve) Space out to every 6 hours as symptoms improve. AZITHROMYCIN (ZITHROMAX) 250 MG TABLET    Take 1 tablet by mouth daily for 4 days    NAPROXEN (NAPROSYN) 500 MG TABLET    Take 1 tablet by mouth 2 times daily    PROMETHAZINE-DEXTROMETHORPHAN (PROMETHAZINE-DM) 6.25-15 MG/5ML SYRUP    Take 5 mLs by mouth 4 times daily as needed for Cough         COUNSELING:  The emergency provider has spoken with the patient and discussed today's findings, in addition to providing specific details for the plan of care. Questions are answered and there is agreement with the plan. Patient was given clear discharge and followup instructions including return to the ER immediately for worsening concerns. Patient is advised to followup with primary care physician and/or referred physician in the next one to two days or sooner if worsening and to return to the ER immediately as above with any concerns. Usual and customary treatment and medication side effects and warning signs discussed.     Patient was discharged from emergency department in good condition with all questions answered and patient has demonstrated capacity to understand these instructions and to follow up. Refer to the patient's discharge summary for more information on plan and follow-up. I have explained to the patient in appropriate terminology our work-up in the ED and their diagnosis. I have also given anticipatory guidance and expectant management of their condition as an outpatient as per my custom. They are instructed to return to the ED if their condition deteriorates in any way    This patient was seen under the direct supervision of the attending physician who was available for consultation. Differential diagnosis: Pneumonia, influenza, viral illness. Consults: None     Reevaluation:  Pt better after albuterol neb treatment x 2 by RT. DECISION to ADMIT / DISCHARGE:     11:28 AM    Clinical Impression:     1.   1. Pneumonia due to organism        Disposition:     All results were shared with the patient, medical decision making was discussed and all questions were answered, and she agreed to assessment and plan. Patient was DISCHARGED from the hospital. Based on the reassuring ED workup and patient's stable vital signs, I feel the patient may be safely discharged home. At this point in time, I believe the patient has the mental capacity to make medical decisions. Scribe:  Sen Vega 12/29/19 11:28 AM Scribing for and in the presence of Rama López PA-C. Signed by: Jeanne Aguirre, 12/29/19 11:28 AM    Provider:  I personally performed the services described in the documentation, reviewed and edited the documentation which was dictated to the scribe in my presence, and it accurately records my words and actions.     Rama López PA-C 12/29/19 11:28 AM      SARA Zelaya  12/29/19 3234

## 2020-01-08 ENCOUNTER — APPOINTMENT (OUTPATIENT)
Dept: GENERAL RADIOLOGY | Age: 25
DRG: 720 | End: 2020-01-08
Payer: MEDICAID

## 2020-01-08 ENCOUNTER — HOSPITAL ENCOUNTER (INPATIENT)
Age: 25
LOS: 1 days | Discharge: HOME HEALTH | DRG: 720 | End: 2020-01-11
Attending: FAMILY MEDICINE | Admitting: INTERNAL MEDICINE
Payer: MEDICAID

## 2020-01-08 PROBLEM — R00.0 TACHYCARDIA: Status: ACTIVE | Noted: 2020-01-08

## 2020-01-08 LAB
ALBUMIN SERPL-MCNC: 4.1 G/DL (ref 3.5–5.1)
ALP BLD-CCNC: 108 U/L (ref 38–126)
ALT SERPL-CCNC: 21 U/L (ref 11–66)
ANION GAP SERPL CALCULATED.3IONS-SCNC: 12 MEQ/L (ref 8–16)
AST SERPL-CCNC: 17 U/L (ref 5–40)
BASOPHILS # BLD: 0.2 %
BASOPHILS ABSOLUTE: 0 THOU/MM3 (ref 0–0.1)
BILIRUB SERPL-MCNC: < 0.2 MG/DL (ref 0.3–1.2)
BUN BLDV-MCNC: 8 MG/DL (ref 7–22)
C-REACTIVE PROTEIN: 1.96 MG/DL (ref 0–1)
CALCIUM SERPL-MCNC: 9.4 MG/DL (ref 8.5–10.5)
CHLORIDE BLD-SCNC: 104 MEQ/L (ref 98–111)
CO2: 23 MEQ/L (ref 23–33)
CREAT SERPL-MCNC: 0.9 MG/DL (ref 0.4–1.2)
EOSINOPHIL # BLD: 0.5 %
EOSINOPHILS ABSOLUTE: 0.1 THOU/MM3 (ref 0–0.4)
ERYTHROCYTE [DISTWIDTH] IN BLOOD BY AUTOMATED COUNT: 12.7 % (ref 11.5–14.5)
ERYTHROCYTE [DISTWIDTH] IN BLOOD BY AUTOMATED COUNT: 45.1 FL (ref 35–45)
FLU A ANTIGEN: POSITIVE
FLU B ANTIGEN: NEGATIVE
GFR SERPL CREATININE-BSD FRML MDRD: 75 ML/MIN/1.73M2
GLUCOSE BLD-MCNC: 84 MG/DL (ref 70–108)
GROUP A STREP CULTURE, REFLEX: NEGATIVE
HCT VFR BLD CALC: 42.1 % (ref 37–47)
HEMOGLOBIN: 13.5 GM/DL (ref 12–16)
IMMATURE GRANS (ABS): 0.13 THOU/MM3 (ref 0–0.07)
IMMATURE GRANULOCYTES: 1 %
LACTIC ACID: 1.6 MMOL/L (ref 0.5–2.2)
LYMPHOCYTES # BLD: 8.5 %
LYMPHOCYTES ABSOLUTE: 1.1 THOU/MM3 (ref 1–4.8)
MCH RBC QN AUTO: 30.8 PG (ref 26–33)
MCHC RBC AUTO-ENTMCNC: 32.1 GM/DL (ref 32.2–35.5)
MCV RBC AUTO: 96.1 FL (ref 81–99)
MONOCYTES # BLD: 8.8 %
MONOCYTES ABSOLUTE: 1.2 THOU/MM3 (ref 0.4–1.3)
NUCLEATED RED BLOOD CELLS: 0 /100 WBC
OSMOLALITY CALCULATION: 275.1 MOSMOL/KG (ref 275–300)
PLATELET # BLD: 349 THOU/MM3 (ref 130–400)
PMV BLD AUTO: 9.7 FL (ref 9.4–12.4)
POTASSIUM SERPL-SCNC: 4.8 MEQ/L (ref 3.5–5.2)
PREGNANCY, SERUM: NEGATIVE
PROCALCITONIN: 0.08 NG/ML (ref 0.01–0.09)
RBC # BLD: 4.38 MILL/MM3 (ref 4.2–5.4)
REFLEX THROAT C + S: NORMAL
SEDIMENTATION RATE, ERYTHROCYTE: 41 MM/HR (ref 0–20)
SEG NEUTROPHILS: 81 %
SEGMENTED NEUTROPHILS ABSOLUTE COUNT: 10.8 THOU/MM3 (ref 1.8–7.7)
SODIUM BLD-SCNC: 139 MEQ/L (ref 135–145)
TOTAL PROTEIN: 7.8 G/DL (ref 6.1–8)
TROPONIN T: < 0.01 NG/ML
WBC # BLD: 13.3 THOU/MM3 (ref 4.8–10.8)

## 2020-01-08 PROCEDURE — 84703 CHORIONIC GONADOTROPIN ASSAY: CPT

## 2020-01-08 PROCEDURE — 96375 TX/PRO/DX INJ NEW DRUG ADDON: CPT

## 2020-01-08 PROCEDURE — 96361 HYDRATE IV INFUSION ADD-ON: CPT

## 2020-01-08 PROCEDURE — G0378 HOSPITAL OBSERVATION PER HR: HCPCS

## 2020-01-08 PROCEDURE — 2580000003 HC RX 258: Performed by: PHYSICIAN ASSISTANT

## 2020-01-08 PROCEDURE — 36415 COLL VENOUS BLD VENIPUNCTURE: CPT

## 2020-01-08 PROCEDURE — 87070 CULTURE OTHR SPECIMN AEROBIC: CPT

## 2020-01-08 PROCEDURE — 99285 EMERGENCY DEPT VISIT HI MDM: CPT

## 2020-01-08 PROCEDURE — 84484 ASSAY OF TROPONIN QUANT: CPT

## 2020-01-08 PROCEDURE — 71046 X-RAY EXAM CHEST 2 VIEWS: CPT

## 2020-01-08 PROCEDURE — 2709999900 HC NON-CHARGEABLE SUPPLY

## 2020-01-08 PROCEDURE — 87804 INFLUENZA ASSAY W/OPTIC: CPT

## 2020-01-08 PROCEDURE — 83605 ASSAY OF LACTIC ACID: CPT

## 2020-01-08 PROCEDURE — 87040 BLOOD CULTURE FOR BACTERIA: CPT

## 2020-01-08 PROCEDURE — 86140 C-REACTIVE PROTEIN: CPT

## 2020-01-08 PROCEDURE — 84145 PROCALCITONIN (PCT): CPT

## 2020-01-08 PROCEDURE — 85651 RBC SED RATE NONAUTOMATED: CPT

## 2020-01-08 PROCEDURE — 85025 COMPLETE CBC W/AUTO DIFF WBC: CPT

## 2020-01-08 PROCEDURE — 80053 COMPREHEN METABOLIC PANEL: CPT

## 2020-01-08 PROCEDURE — 99220 PR INITIAL OBSERVATION CARE/DAY 70 MINUTES: CPT | Performed by: FAMILY MEDICINE

## 2020-01-08 PROCEDURE — 87880 STREP A ASSAY W/OPTIC: CPT

## 2020-01-08 PROCEDURE — 96374 THER/PROPH/DIAG INJ IV PUSH: CPT

## 2020-01-08 PROCEDURE — 6360000002 HC RX W HCPCS: Performed by: PHYSICIAN ASSISTANT

## 2020-01-08 PROCEDURE — 93005 ELECTROCARDIOGRAM TRACING: CPT | Performed by: PHYSICIAN ASSISTANT

## 2020-01-08 PROCEDURE — 6370000000 HC RX 637 (ALT 250 FOR IP): Performed by: PHYSICIAN ASSISTANT

## 2020-01-08 RX ORDER — ACETAMINOPHEN 325 MG/1
650 TABLET ORAL EVERY 6 HOURS PRN
Status: DISCONTINUED | OUTPATIENT
Start: 2020-01-09 | End: 2020-01-09

## 2020-01-08 RX ORDER — OSELTAMIVIR PHOSPHATE 75 MG/1
75 CAPSULE ORAL 2 TIMES DAILY
Status: DISCONTINUED | OUTPATIENT
Start: 2020-01-08 | End: 2020-01-11 | Stop reason: HOSPADM

## 2020-01-08 RX ORDER — SODIUM CHLORIDE 0.9 % (FLUSH) 0.9 %
10 SYRINGE (ML) INJECTION EVERY 12 HOURS SCHEDULED
Status: DISCONTINUED | OUTPATIENT
Start: 2020-01-09 | End: 2020-01-11 | Stop reason: HOSPADM

## 2020-01-08 RX ORDER — LIDOCAINE 4 G/G
1 PATCH TOPICAL ONCE
Status: COMPLETED | OUTPATIENT
Start: 2020-01-08 | End: 2020-01-09

## 2020-01-08 RX ORDER — BUSPIRONE HYDROCHLORIDE 5 MG/1
5 TABLET ORAL 2 TIMES DAILY
Status: DISCONTINUED | OUTPATIENT
Start: 2020-01-09 | End: 2020-01-11 | Stop reason: HOSPADM

## 2020-01-08 RX ORDER — 0.9 % SODIUM CHLORIDE 0.9 %
1000 INTRAVENOUS SOLUTION INTRAVENOUS ONCE
Status: COMPLETED | OUTPATIENT
Start: 2020-01-08 | End: 2020-01-08

## 2020-01-08 RX ORDER — ACETAMINOPHEN 325 MG/1
650 TABLET ORAL ONCE
Status: COMPLETED | OUTPATIENT
Start: 2020-01-08 | End: 2020-01-08

## 2020-01-08 RX ORDER — IPRATROPIUM BROMIDE AND ALBUTEROL SULFATE 2.5; .5 MG/3ML; MG/3ML
1 SOLUTION RESPIRATORY (INHALATION) ONCE
Status: DISCONTINUED | OUTPATIENT
Start: 2020-01-08 | End: 2020-01-08

## 2020-01-08 RX ORDER — CODEINE PHOSPHATE AND GUAIFENESIN 10; 100 MG/5ML; MG/5ML
5 SOLUTION ORAL EVERY 4 HOURS PRN
Status: DISCONTINUED | OUTPATIENT
Start: 2020-01-08 | End: 2020-01-11 | Stop reason: HOSPADM

## 2020-01-08 RX ORDER — SODIUM CHLORIDE 0.9 % (FLUSH) 0.9 %
10 SYRINGE (ML) INJECTION PRN
Status: DISCONTINUED | OUTPATIENT
Start: 2020-01-08 | End: 2020-01-11 | Stop reason: HOSPADM

## 2020-01-08 RX ORDER — VENLAFAXINE HYDROCHLORIDE 37.5 MG/1
37.5 CAPSULE, EXTENDED RELEASE ORAL
Status: DISCONTINUED | OUTPATIENT
Start: 2020-01-09 | End: 2020-01-11 | Stop reason: HOSPADM

## 2020-01-08 RX ORDER — ONDANSETRON 2 MG/ML
4 INJECTION INTRAMUSCULAR; INTRAVENOUS ONCE
Status: COMPLETED | OUTPATIENT
Start: 2020-01-08 | End: 2020-01-08

## 2020-01-08 RX ORDER — SODIUM CHLORIDE 9 MG/ML
INJECTION, SOLUTION INTRAVENOUS CONTINUOUS
Status: DISCONTINUED | OUTPATIENT
Start: 2020-01-09 | End: 2020-01-10

## 2020-01-08 RX ORDER — KETOROLAC TROMETHAMINE 30 MG/ML
30 INJECTION, SOLUTION INTRAMUSCULAR; INTRAVENOUS ONCE
Status: COMPLETED | OUTPATIENT
Start: 2020-01-08 | End: 2020-01-08

## 2020-01-08 RX ORDER — 0.9 % SODIUM CHLORIDE 0.9 %
1000 INTRAVENOUS SOLUTION INTRAVENOUS ONCE
Status: COMPLETED | OUTPATIENT
Start: 2020-01-09 | End: 2020-01-09

## 2020-01-08 RX ORDER — ONDANSETRON 2 MG/ML
4 INJECTION INTRAMUSCULAR; INTRAVENOUS EVERY 6 HOURS PRN
Status: DISCONTINUED | OUTPATIENT
Start: 2020-01-08 | End: 2020-01-11 | Stop reason: HOSPADM

## 2020-01-08 RX ADMIN — KETOROLAC TROMETHAMINE 30 MG: 30 INJECTION, SOLUTION INTRAMUSCULAR at 22:48

## 2020-01-08 RX ADMIN — GUAIFENESIN AND CODEINE PHOSPHATE 5 ML: 10; 100 LIQUID ORAL at 21:09

## 2020-01-08 RX ADMIN — ONDANSETRON 4 MG: 2 INJECTION INTRAMUSCULAR; INTRAVENOUS at 22:45

## 2020-01-08 RX ADMIN — ACETAMINOPHEN 650 MG: 325 TABLET ORAL at 22:10

## 2020-01-08 RX ADMIN — SODIUM CHLORIDE 1000 ML: 9 INJECTION, SOLUTION INTRAVENOUS at 21:09

## 2020-01-08 RX ADMIN — OSELTAMIVIR PHOSPHATE 75 MG: 75 CAPSULE ORAL at 22:47

## 2020-01-08 ASSESSMENT — ENCOUNTER SYMPTOMS
SHORTNESS OF BREATH: 1
CHEST TIGHTNESS: 1
RHINORRHEA: 0
SORE THROAT: 0
DIARRHEA: 0
COUGH: 1
VOMITING: 0
COLOR CHANGE: 0
WHEEZING: 1
ABDOMINAL PAIN: 0

## 2020-01-08 ASSESSMENT — PAIN DESCRIPTION - DESCRIPTORS
DESCRIPTORS: ACHING
DESCRIPTORS: ACHING

## 2020-01-08 ASSESSMENT — PAIN DESCRIPTION - PAIN TYPE
TYPE: ACUTE PAIN
TYPE: ACUTE PAIN

## 2020-01-08 ASSESSMENT — PAIN DESCRIPTION - FREQUENCY
FREQUENCY: CONTINUOUS
FREQUENCY: CONTINUOUS

## 2020-01-08 ASSESSMENT — PAIN SCALES - GENERAL
PAINLEVEL_OUTOF10: 6

## 2020-01-08 ASSESSMENT — PAIN DESCRIPTION - LOCATION
LOCATION: CHEST
LOCATION: CHEST

## 2020-01-08 ASSESSMENT — PAIN DESCRIPTION - ONSET
ONSET: ON-GOING
ONSET: ON-GOING

## 2020-01-08 ASSESSMENT — PAIN DESCRIPTION - ORIENTATION
ORIENTATION: MID
ORIENTATION: MID

## 2020-01-08 ASSESSMENT — PAIN DESCRIPTION - PROGRESSION
CLINICAL_PROGRESSION: GRADUALLY WORSENING
CLINICAL_PROGRESSION: GRADUALLY WORSENING
CLINICAL_PROGRESSION: NOT CHANGED

## 2020-01-09 LAB
ANION GAP SERPL CALCULATED.3IONS-SCNC: 12 MEQ/L (ref 8–16)
BACTERIA: ABNORMAL
BASOPHILS # BLD: 0.3 %
BASOPHILS ABSOLUTE: 0 THOU/MM3 (ref 0–0.1)
BILIRUBIN URINE: NEGATIVE
BLOOD, URINE: ABNORMAL
BUN BLDV-MCNC: 10 MG/DL (ref 7–22)
CALCIUM SERPL-MCNC: 7.9 MG/DL (ref 8.5–10.5)
CASTS: ABNORMAL /LPF
CASTS: ABNORMAL /LPF
CHARACTER, URINE: CLEAR
CHLORIDE BLD-SCNC: 106 MEQ/L (ref 98–111)
CO2: 20 MEQ/L (ref 23–33)
COLOR: YELLOW
CREAT SERPL-MCNC: 0.9 MG/DL (ref 0.4–1.2)
CRYSTALS: ABNORMAL
EKG ATRIAL RATE: 122 BPM
EKG P AXIS: 55 DEGREES
EKG P-R INTERVAL: 146 MS
EKG Q-T INTERVAL: 294 MS
EKG QRS DURATION: 80 MS
EKG QTC CALCULATION (BAZETT): 418 MS
EKG R AXIS: 50 DEGREES
EKG T AXIS: 50 DEGREES
EKG VENTRICULAR RATE: 122 BPM
EOSINOPHIL # BLD: 0.2 %
EOSINOPHILS ABSOLUTE: 0 THOU/MM3 (ref 0–0.4)
EPITHELIAL CELLS, UA: ABNORMAL /HPF
ERYTHROCYTE [DISTWIDTH] IN BLOOD BY AUTOMATED COUNT: 13.2 % (ref 11.5–14.5)
ERYTHROCYTE [DISTWIDTH] IN BLOOD BY AUTOMATED COUNT: 46.6 FL (ref 35–45)
GFR SERPL CREATININE-BSD FRML MDRD: 75 ML/MIN/1.73M2
GLUCOSE BLD-MCNC: 98 MG/DL (ref 70–108)
GLUCOSE, URINE: NEGATIVE MG/DL
HCT VFR BLD CALC: 36.9 % (ref 37–47)
HEMOGLOBIN: 11.7 GM/DL (ref 12–16)
IMMATURE GRANS (ABS): 0.13 THOU/MM3 (ref 0–0.07)
IMMATURE GRANULOCYTES: 1.2 %
KETONES, URINE: ABNORMAL
LEUKOCYTE ESTERASE, URINE: NEGATIVE
LYMPHOCYTES # BLD: 10.6 %
LYMPHOCYTES ABSOLUTE: 1.1 THOU/MM3 (ref 1–4.8)
MCH RBC QN AUTO: 31 PG (ref 26–33)
MCHC RBC AUTO-ENTMCNC: 31.7 GM/DL (ref 32.2–35.5)
MCV RBC AUTO: 97.9 FL (ref 81–99)
MISCELLANEOUS LAB TEST RESULT: ABNORMAL
MONOCYTES # BLD: 11.7 %
MONOCYTES ABSOLUTE: 1.2 THOU/MM3 (ref 0.4–1.3)
NITRITE, URINE: NEGATIVE
NUCLEATED RED BLOOD CELLS: 0 /100 WBC
PH UA: 5.5 (ref 5–9)
PLATELET # BLD: 302 THOU/MM3 (ref 130–400)
PMV BLD AUTO: 9.6 FL (ref 9.4–12.4)
POTASSIUM REFLEX MAGNESIUM: 4.3 MEQ/L (ref 3.5–5.2)
PROTEIN UA: NEGATIVE MG/DL
RBC # BLD: 3.77 MILL/MM3 (ref 4.2–5.4)
RBC URINE: ABNORMAL /HPF
RENAL EPITHELIAL, UA: ABNORMAL
SEG NEUTROPHILS: 76 %
SEGMENTED NEUTROPHILS ABSOLUTE COUNT: 8.1 THOU/MM3 (ref 1.8–7.7)
SODIUM BLD-SCNC: 138 MEQ/L (ref 135–145)
SPECIFIC GRAVITY UA: 1.03 (ref 1–1.03)
UROBILINOGEN, URINE: 0.2 EU/DL (ref 0–1)
WBC # BLD: 10.6 THOU/MM3 (ref 4.8–10.8)
WBC UA: ABNORMAL /HPF
YEAST: ABNORMAL

## 2020-01-09 PROCEDURE — 96376 TX/PRO/DX INJ SAME DRUG ADON: CPT

## 2020-01-09 PROCEDURE — 6360000002 HC RX W HCPCS: Performed by: FAMILY MEDICINE

## 2020-01-09 PROCEDURE — G0378 HOSPITAL OBSERVATION PER HR: HCPCS

## 2020-01-09 PROCEDURE — 6370000000 HC RX 637 (ALT 250 FOR IP): Performed by: FAMILY MEDICINE

## 2020-01-09 PROCEDURE — 80048 BASIC METABOLIC PNL TOTAL CA: CPT

## 2020-01-09 PROCEDURE — 81001 URINALYSIS AUTO W/SCOPE: CPT

## 2020-01-09 PROCEDURE — 96361 HYDRATE IV INFUSION ADD-ON: CPT

## 2020-01-09 PROCEDURE — 94640 AIRWAY INHALATION TREATMENT: CPT

## 2020-01-09 PROCEDURE — 2709999900 HC NON-CHARGEABLE SUPPLY

## 2020-01-09 PROCEDURE — 36415 COLL VENOUS BLD VENIPUNCTURE: CPT

## 2020-01-09 PROCEDURE — 99226 PR SBSQ OBSERVATION CARE/DAY 35 MINUTES: CPT | Performed by: FAMILY MEDICINE

## 2020-01-09 PROCEDURE — 2580000003 HC RX 258: Performed by: FAMILY MEDICINE

## 2020-01-09 PROCEDURE — 85025 COMPLETE CBC W/AUTO DIFF WBC: CPT

## 2020-01-09 PROCEDURE — 93010 ELECTROCARDIOGRAM REPORT: CPT | Performed by: INTERNAL MEDICINE

## 2020-01-09 PROCEDURE — 94761 N-INVAS EAR/PLS OXIMETRY MLT: CPT

## 2020-01-09 RX ORDER — IBUPROFEN 400 MG/1
400 TABLET ORAL EVERY 6 HOURS PRN
Status: DISCONTINUED | OUTPATIENT
Start: 2020-01-09 | End: 2020-01-11 | Stop reason: HOSPADM

## 2020-01-09 RX ORDER — ACETAMINOPHEN 325 MG/1
650 TABLET ORAL EVERY 4 HOURS PRN
Status: DISCONTINUED | OUTPATIENT
Start: 2020-01-09 | End: 2020-01-11 | Stop reason: HOSPADM

## 2020-01-09 RX ADMIN — GUAIFENESIN AND CODEINE PHOSPHATE 5 ML: 10; 100 LIQUID ORAL at 15:02

## 2020-01-09 RX ADMIN — GUAIFENESIN AND CODEINE PHOSPHATE 5 ML: 10; 100 LIQUID ORAL at 01:32

## 2020-01-09 RX ADMIN — VENLAFAXINE HYDROCHLORIDE 37.5 MG: 37.5 CAPSULE, EXTENDED RELEASE ORAL at 07:34

## 2020-01-09 RX ADMIN — IPRATROPIUM BROMIDE 0.5 MG: 0.5 SOLUTION RESPIRATORY (INHALATION) at 08:15

## 2020-01-09 RX ADMIN — SODIUM CHLORIDE 1000 ML: 9 INJECTION, SOLUTION INTRAVENOUS at 00:00

## 2020-01-09 RX ADMIN — OSELTAMIVIR PHOSPHATE 75 MG: 75 CAPSULE ORAL at 07:34

## 2020-01-09 RX ADMIN — BUSPIRONE HYDROCHLORIDE 5 MG: 5 TABLET ORAL at 07:34

## 2020-01-09 RX ADMIN — SODIUM CHLORIDE: 9 INJECTION, SOLUTION INTRAVENOUS at 02:04

## 2020-01-09 RX ADMIN — OSELTAMIVIR PHOSPHATE 75 MG: 75 CAPSULE ORAL at 20:32

## 2020-01-09 RX ADMIN — IPRATROPIUM BROMIDE 0.5 MG: 0.5 SOLUTION RESPIRATORY (INHALATION) at 22:00

## 2020-01-09 RX ADMIN — SODIUM CHLORIDE: 9 INJECTION, SOLUTION INTRAVENOUS at 22:33

## 2020-01-09 RX ADMIN — ACETAMINOPHEN 650 MG: 325 TABLET ORAL at 11:50

## 2020-01-09 RX ADMIN — IBUPROFEN 400 MG: 400 TABLET ORAL at 08:51

## 2020-01-09 RX ADMIN — ACETAMINOPHEN 650 MG: 325 TABLET ORAL at 03:22

## 2020-01-09 RX ADMIN — BUSPIRONE HYDROCHLORIDE 5 MG: 5 TABLET ORAL at 01:32

## 2020-01-09 RX ADMIN — ACETAMINOPHEN 650 MG: 325 TABLET ORAL at 07:32

## 2020-01-09 RX ADMIN — GUAIFENESIN AND CODEINE PHOSPHATE 5 ML: 10; 100 LIQUID ORAL at 08:53

## 2020-01-09 RX ADMIN — IPRATROPIUM BROMIDE 0.5 MG: 0.5 SOLUTION RESPIRATORY (INHALATION) at 16:28

## 2020-01-09 RX ADMIN — ACETAMINOPHEN 650 MG: 325 TABLET ORAL at 18:21

## 2020-01-09 RX ADMIN — SODIUM CHLORIDE: 9 INJECTION, SOLUTION INTRAVENOUS at 12:31

## 2020-01-09 RX ADMIN — GUAIFENESIN AND CODEINE PHOSPHATE 5 ML: 10; 100 LIQUID ORAL at 23:47

## 2020-01-09 RX ADMIN — IPRATROPIUM BROMIDE 0.5 MG: 0.5 SOLUTION RESPIRATORY (INHALATION) at 03:21

## 2020-01-09 RX ADMIN — IBUPROFEN 400 MG: 400 TABLET ORAL at 22:33

## 2020-01-09 RX ADMIN — BUSPIRONE HYDROCHLORIDE 5 MG: 5 TABLET ORAL at 20:32

## 2020-01-09 RX ADMIN — ONDANSETRON 4 MG: 2 INJECTION INTRAMUSCULAR; INTRAVENOUS at 07:26

## 2020-01-09 RX ADMIN — IBUPROFEN 400 MG: 400 TABLET ORAL at 15:02

## 2020-01-09 RX ADMIN — IPRATROPIUM BROMIDE 0.5 MG: 0.5 SOLUTION RESPIRATORY (INHALATION) at 12:24

## 2020-01-09 RX ADMIN — ACETAMINOPHEN 650 MG: 325 TABLET ORAL at 23:47

## 2020-01-09 ASSESSMENT — PAIN - FUNCTIONAL ASSESSMENT
PAIN_FUNCTIONAL_ASSESSMENT: ACTIVITIES ARE NOT PREVENTED

## 2020-01-09 ASSESSMENT — PAIN DESCRIPTION - PAIN TYPE
TYPE: ACUTE PAIN

## 2020-01-09 ASSESSMENT — PAIN DESCRIPTION - LOCATION
LOCATION: RIB CAGE
LOCATION: RIB CAGE
LOCATION: CHEST

## 2020-01-09 ASSESSMENT — PAIN DESCRIPTION - ONSET
ONSET: ON-GOING

## 2020-01-09 ASSESSMENT — PAIN DESCRIPTION - FREQUENCY
FREQUENCY: INTERMITTENT

## 2020-01-09 ASSESSMENT — PAIN DESCRIPTION - ORIENTATION
ORIENTATION: RIGHT;LEFT
ORIENTATION: LEFT;RIGHT
ORIENTATION: RIGHT;LEFT

## 2020-01-09 ASSESSMENT — PAIN DESCRIPTION - DESCRIPTORS
DESCRIPTORS: ACHING

## 2020-01-09 ASSESSMENT — PAIN SCALES - GENERAL
PAINLEVEL_OUTOF10: 5
PAINLEVEL_OUTOF10: 6
PAINLEVEL_OUTOF10: 6
PAINLEVEL_OUTOF10: 5
PAINLEVEL_OUTOF10: 6
PAINLEVEL_OUTOF10: 4
PAINLEVEL_OUTOF10: 3

## 2020-01-09 ASSESSMENT — PAIN DESCRIPTION - PROGRESSION
CLINICAL_PROGRESSION: NOT CHANGED
CLINICAL_PROGRESSION: NOT CHANGED
CLINICAL_PROGRESSION: GRADUALLY IMPROVING
CLINICAL_PROGRESSION: NOT CHANGED

## 2020-01-09 NOTE — ED PROVIDER NOTES
[facility]  eMERGENCY dEPARTMENT eNCOUnter          CHIEF COMPLAINT       Chief Complaint   Patient presents with    Chest Pain       Nurses Notes reviewed and I agree except as noted in the HPI. HISTORY OF PRESENT ILLNESS    Li Ramos is a 30 y.o. female who presents to the Emergency Department for the evaluation of chest pain. Patient states that on 12/29, she was seen in the emergency department and diagnosed with pneumonia. She received course of Zithromax with resolution of symptoms, felt she was back to her baseline. However, yesterday she began noticing some chest pain in the bilateral lower ribs. Denies any change in pain with positional changes. She was seen at the [facility] for this and was prescribed some additional cough medication as well as steroids and ibuprofen. She states she has taken multiple doses of the steroids without improvement of her pain. She reports pain was mild yesterday but it has been progressing throughout the day today. Reports she woke up this morning with subjective fevers and chills, cough, chest congestion, chest tightness and wheezing. Reports her albuterol inhaler that she was prescribed on her previous visit did improve her symptoms then but she has not tried this yet today. She denies any treatment prior to arrival today. She denies associated vomiting, diarrhea, urinary changes, sore throat, rhinorrhea, ear pain or syncope. The HPI was provided by the patient. REVIEW OF SYSTEMS     Review of Systems   Constitutional: Positive for chills and fever. HENT: Negative for rhinorrhea and sore throat. Respiratory: Positive for cough, chest tightness, shortness of breath and wheezing. Cardiovascular: Positive for chest pain. Negative for leg swelling. Gastrointestinal: Negative for abdominal pain, diarrhea and vomiting. Genitourinary: Negative for dysuria and hematuria. Skin: Negative for color change. Neurological: Positive for light-headedness. Negative for syncope, weakness and headaches. PAST MEDICAL HISTORY    has a past medical history of Arthritis, Bipolar 1 disorder ([address] Diabetes mellitus ([address] and Fibromyalgia. SURGICAL HISTORY      has a past surgical history that includes [place] tooth extraction. CURRENT MEDICATIONS       Current Discharge Medication List      CONTINUE these medications which have NOT CHANGED    Details   albuterol sulfate HFA (PROVENTIL HFA) 108 (90 Base) MCG/ACT inhaler Inhale 2 puffs into the lungs every 4 hours as needed for Wheezing or Shortness of Breath (Space out to every 6 hours as symptoms improve) Space out to every 6 hours as symptoms improve. Qty: 1 Inhaler, Refills: 0      naproxen (NAPROSYN) 500 MG tablet Take 1 tablet by mouth 2 times daily  Qty: 60 tablet, Refills: 0      busPIRone (BUSPAR) 5 MG tablet Take 1 tablet by mouth 2 times daily  Qty: 30 tablet, Refills: 0      venlafaxine (EFFEXOR XR) 37.5 MG extended release capsule Take 1 capsule by mouth daily (with breakfast)  Qty: 30 capsule, Refills: 0      Multiple Vitamins-Minerals (WOMENS DAILY FORMULA PO) Take by mouth             ALLERGIES     has No Known Allergies. FAMILY HISTORY     She indicated that her mother is alive. She indicated that her father is alive. family history is not on file. SOCIAL HISTORY      reports that she has been smoking cigarettes. She has been smoking about 1.00 pack per day. She has never used smokeless tobacco. She reports previous alcohol use. She reports that she does not use drugs. PHYSICAL EXAM     INITIAL VITALS:  weight is 250 lb (113.4 kg). Her oral temperature is 101.7 °F (38.7 °C). Her blood pressure is 135/72 and her pulse is 131. Her respiration is 22 and oxygen saturation is 92%. Physical Exam  Vitals signs and nursing note reviewed. HENT:      Head: Normocephalic and atraumatic.       Mouth/Throat:      Mouth: Mucous membranes are moist.

## 2020-01-09 NOTE — H&P
naproxen (NAPROSYN) 500 MG tablet Take 1 tablet by mouth 2 times daily    busPIRone (BUSPAR) 5 MG tablet Take 1 tablet by mouth 2 times daily    venlafaxine (EFFEXOR XR) 37.5 MG extended release capsule Take 1 capsule by mouth daily (with breakfast)    Multiple Vitamins-Minerals (WOMENS DAILY FORMULA PO) Take by mouth     Allergies:   Patient has no known allergies. Social History:   Socioeconomic History    Marital status: Single   Tobacco Use    Smoking status: Current Every Day Smoker     Packs/day: 1.00     Types: Cigarettes    Smokeless tobacco: Never Used   Substance and Sexual Activity    Alcohol use: Not Currently    Drug use: No    Sexual activity: Not Currently     Partners: Male     Family History:    History reviewed. No pertinent family history. REVIEW OF SYSTEMS:  A 14-point ROS was obtained and negative, with the exception of pertinent positives as stated in the HPI. PHYSICAL EXAM:  Vitals:    01/08/20 1957 01/08/20 2059 01/08/20 2211   BP: (!) 159/88  (!) 149/85   Pulse: 121 126 140   Resp: 18 19 28   Temp: 100.2 °F (37.9 °C)     TempSrc: Oral     SpO2: 96% 97% 94%   Weight: 250 lb (113.4 kg)     RA    General appearance: Alert / non-toxic-appearing female. Cooperative. NAD. HEENT:  Normocephalic / atraumatic. EOMI. Conjunctivae appear normal.  Neck: Supple. No JVD. Respiratory: Normal respiratory effort on RA. Lung sounds diminished to auscultation with faint expiratory wheezes noted bilaterally. No rales / rhonchi. No conversational dyspnea or accessory muscle use. Cardiovascular: Tachycardic rate. Regular rhythm. Normal S1/S2. No murmurs / rubs / gallops. Abdomen: Obese. Soft / non-tender / non-distended. BS present. Musculoskeletal: No cyanosis or edema. Skin: Warm / dry. Normal turgor. No pallor or diaphoresis. Neurologic: A/O x 3. Speech normal. Answers questions appropriately. No obvious focal neurologic deficits.   Psychiatric: Thought content / judgment / insight Value Ref Range    Preg, Serum NEGATIVE NEGATIVE   Lactic acid, plasma   Result Value Ref Range    Lactic Acid 1.6 0.5 - 2.2 mmol/L   Procalcitonin   Result Value Ref Range    Procalcitonin 0.08 0.01 - 0.09 ng/mL   Anion Gap   Result Value Ref Range    Anion Gap 12.0 8.0 - 16.0 meq/L   Osmolality   Result Value Ref Range    Osmolality Calc 275.1 275.0 - 300 mOsmol/kg   Glomerular Filtration Rate, Estimated   Result Value Ref Range    Est, Glom Filt Rate 75 (A) ml/min/1.73m2     EKG: ordered / not yet performed in the ED    Radiology:     Xr Chest Standard (2 Vw)  Result Date: 1/8/2020  PROCEDURE: XR CHEST (2 VW) CLINICAL INFORMATION: cough. COMPARISON: Chest x-ray dated 12/29/2019 TECHNIQUE: PA and lateral views of the chest. FINDINGS: Lungs/pleura: No pneumonia, pulmonary edema, or obvious mass. There is mild left upper lobe and right middle lobe atelectasis. No pleural effusion. No pneumothorax. Heart: Heart size is normal. Mediastinum/duane: No obvious mass or adenopathy. Skeleton: No acute/significant bone or joint abnormality. No acute pneumonia or pulmonary edema. Mild left upper lobe and right middle lobe atelectasis. **This report has been created using voice recognition software. It may contain minor errors which are inherent in voice recognition technology. ** Final report electronically signed by Dr. Juan Jose Harding on 1/8/2020 9:35 PM    FEN/GI/DVT:  IVF: 0.9 NS (additional 1L bolus > MIVF)  Electrolytes: replacement protocols  Diet: regular  GI PPX: none indicated  DVT PPX: SCDs    CODE STATUS: FULL    No primary care provider on file.     Electronically signed by Pao Salmon MD on 1/8/2020

## 2020-01-09 NOTE — PROGRESS NOTES
Hospitalist Progress Note      Patient:  Kathrynn Merlin      Unit/Bed:3B-33/033-A    YOB: 1995    MRN: [id number]       Acct: [de-identified]     PCP: No primary care provider on file. Date of Admission: 1/8/2020    Assessment/Plan:    1. Influenza A -- tamiflu started 1/8 -> cont supportive care with prn BD tx, IVF, APAP, ibuprofen - monitor resp status closely. 2. SIRS with tachycardia, leukocytosis, fever, +influenza A -- IVF bolus 1L in ER and with NS at 100 mL started on admission -> ?change to LR --- tamiflu started 1/8 - LA normal 1.6 on 1/8 and PCT normal 0.08 on 1/8 -- cont monitor and supportive care with IVF, tylenol and added ibuprofen for fevers/myalgias  -- still febrile 102.7 am 1/9  -- WBC improving 10.6 on 1/9 from 13.3 on admission -> cont monitor  -- u/a (-), strep screen (-), blood cx 1/8 (-) to date  -- CXR 1/8 no acute pneumonia or edema but with mild MAGED and RML atelectasis -> watch closely for developing pneumonia  3. Sinus tachycardia  -- due to #1,2 - no RF for PE other than smoker -> NOT hypoxic either -- cont IVF, monitor tele and ?need add BB if no improvement with fluids -- ?need UDS  4. Leukocytosis -- due to #1 -- u/a (-), strep screen (-), blood cx 1/8 (-) to date -- tamiflu for #1 - PCT (-) - no atbx needed  5. Bilateral MAGED and RML atelectasis -- noted per CXR 1/8 -> recent tx for RLL PNA with zmax 12/29 x 5 days -- PCT 1/8 (-) thus no atbx at this time -- added IS 1/9 - repeat CXR 1/10 as at risk for worsening pneumonia with #1  6. Metabolic acidosis  -- CO2 down 1/9 am from admission - ?need to change IVF to LR - cont monitor -- LA 1.6 on admission 1/8 and NOT repeated -- likely due to #1,2 -- monitor with NS IVF  7. Atypical chest pain -- due to #1,3 -- trop (-) x 1, no EKG abn - tylenol prn - added ibuprofen 1/9 - ?toradol if unable rob po  8.  Normocytic anemia- - hgb down to 11.7 on 1/9 from 13.5 on admission - normal in past in 2018 -- ?dilutional - no signs of loss - consider further w/u if cont drop - monitor  9. BIpolar/anxiety do -- cont home effexor, buspar  10. Fibromyalgia/OA -- cont APAP - added ibuprofen - takes naprosyn at home? ? -- worsened myalgias with #1   11. Morbid obesity Body mass index is 45.73 kg/m². --  on lifestyle modification - BS normal.    12. Ongoing tobacco abuse  -- counseled on cessation - monitor for need for patch    DIspo  -- 1/9-> still febrile, looks quite ill this am, tachy consistently to 120's this am - cont supportive care with tamiflu, IVF, APAP and add prn ibuprofen - monitor po intake - repeat CXR tomorrow and add IS given recent PNA and risk for further development. Cont monitor lytes, renal fxn, HR/Tele, BP. ?home tomorrow -- ?move to tele bed if needs to stay longer. Chief Complaint: chest pain    Hospital Course: Kale Mendieta is a 30 y.o. female with anxiety d/o, bipolar d/o OA, fibromyalgia, +smoker presenting with lower bilateral chest and lower rib pain, f/c, congestion, myalgias, wheezing. Recent for pneumonia per ER visit 12/29/19 with zmax and was back to baseline prior to this presentation. Per Dr. Harris's note 1/8 states \"ED course: febrile (TMax 101.7 degrees), hypertensive, tachyardic (120s), RR 20s with SpO2 96% on RA. Workup remarkable for: WBC 13.3, CRP 1.96. Cr 0.9 / BUN 8 / eGFR 87. LA nml. Procal negative. Trop negative. Strep negative. Flu A positive. EKG: sinus tachycardia / no ischemic changes. CXR: NAP. Received 1L IVF bolus, tylenol x 1, toradol x 1, tamiflu x 1 / patient with persistent CP and tachycardia in the 130s despite these therapies. Hospitalist service contacted for admission. -- started on tamiflu, IVF and admitted for further monitoring. Subjective/HPI:   -- 1/9/2020  --> pt still febrile 102.7 this am and frequent coughing but feels it is getting better. Temp improving after tylenol this am and down to 98.9.   Still sore all over from cough and diffuse body aches. Denies sob. Denies abd pain but with some nausea - didn't eat breakfast.  No emesis. No diarrhea. On RA. HR up to 160's last 24 hrs and still 120's at rest.       PMH, SURGICAL HX, FH, SOCIAL HX reviewed and updated as needed. Medications:  Reviewed    Infusion Medications    sodium chloride 100 mL/hr at 01/09/20 0204     Scheduled Medications    lidocaine  1 patch Transdermal Once    oseltamivir  75 mg Oral BID    ipratropium  0.5 mg Nebulization 4x daily    busPIRone  5 mg Oral BID    venlafaxine  37.5 mg Oral Daily with breakfast    sodium chloride flush  10 mL Intravenous 2 times per day     PRN Meds: acetaminophen, ibuprofen, guaiFENesin-codeine, sodium chloride flush, magnesium hydroxide, ondansetron, ipratropium      Intake/Output Summary (Last 24 hours) at 1/9/2020 0844  Last data filed at 1/9/2020 0404  Gross per 24 hour   Intake --   Output 350 ml   Net -350 ml       Diet:  DIET GENERAL;    Exam:  /87   Pulse 123   Temp 102.7 °F (39.3 °C) (Oral)   Resp 20   Wt 250 lb (113.4 kg)   SpO2 91%   BMI 45.73 kg/m²     General appearance: Looks ill, oriented x 3, +toxic; Face flushed  HEENT: Pupils equal, round, and reactive to light. Conjunctivae/corneas clear. MM dry - OP slightly red but clear. Neck: Supple, with full range of motion. No thyromegaly. Trachea midline. No TTP. No neck stiffness. Respiratory:  Normal respiratory effort. Clear to auscultation, bilaterally without Rales/Wheezes/Rhonchi. No respiratory distress or accessory muscle use. Cardiovascular: tachy but regular with normal S1/S2 without murmurs, rubs or gallops. No JVD. Abdomen: Soft, non-tender, non-distended with normal bowel sounds. No rebound or guarding  Musculoskeletal: No clubbing, cyanosis or edema bilaterally. Full range of motion without deformity. No calf tenderness palpation  Skin: Skin color, texture, turgor normal.  No rashes or lesions.   Face flushed  Neurologic: Facility       [] Other-    Code Status: Full Code    PT/OT Eval Status: not needed      Electronically signed by Inocente Jules MD on 1/9/2020 at 8:44 AM

## 2020-01-09 NOTE — PROGRESS NOTES
Patient arrived per cart to 3B. Heart monitor applied and vitals taken. Admission paperwork completed. Explained to patient that [facility] is not responsible for any lost or stolen items. Patient verbalized understanding. Oriented to room and use of call light and bed controls. Patient denies pain or needs. No signs of distress noted. Bed locked & in low position, side-rails up x2. Call light in reach. Reminded patient to call nurse if any needs arise.

## 2020-01-09 NOTE — CARE COORDINATION
20, 7:18 AM  DISCHARGE PLANNING EVALUATION:    Alton Delarosa       Admitted from: ED 2020/ [address] day: 0   Location: [address] [place] [address]-A Reason for admit: Tachycardia [R00.0] Status: Obs  Admit order signed?: yes  PMH:  has a past medical history of Arthritis, Bipolar 1 disorder ([facility] [address] Diabetes mellitus ([facility] [address] and Fibromyalgia. Procedure: none  Pertinent abnormal Imagin/8 CXR:   No acute pneumonia or pulmonary edema. Mild left upper lobe and right middle lobe atelectasis. Medications:  Scheduled Meds:   lidocaine  1 patch Transdermal Once    oseltamivir  75 mg Oral BID    ipratropium  0.5 mg Nebulization 4x daily    busPIRone  5 mg Oral BID    venlafaxine  37.5 mg Oral Daily with breakfast    sodium chloride flush  10 mL Intravenous 2 times per day     Continuous Infusions:   sodium chloride 100 mL/hr at 20 0204      Pertinent Info/Orders/Treatment Plan:  Presented to ED with chest pain, reports on  she was diagnosed with pneumonia and did finish prescribed medications. + influenza. Tamiflu. (-) troponin. Sinus tach-per admitting physician could be related to anxiety, hx of bipolar and anxiety. 96% RA. Telemetry. IVF. Nebs. Diet: DIET GENERAL;   Smoking status:  reports that she has been smoking cigarettes. She has been smoking about 1.00 pack per day. She has never used smokeless tobacco.   PCP: No primary care provider on file. Readmission 30 days or less: no   %    Discharge Planning Evaluation  Current Residence:  Group Home  Living Arrangements:  Other (Comment)   Support Systems:  Family Members  Current Services PTA:     Potential Assistance Needed:  N/A  Potential Assistance Purchasing Medications:  No  Does patient want to participate in local refill/ meds to beds program?  No  Type of Home Care Services:  None  Patient expects to be discharged to:  [address] [place] [address] [facility]  Expected Discharge date:      Follow Up Appointment: Best Day/ Time:      Patient Goals/Plan/Treatment Preferences: Spoke with patient, she plans to return to [facility] home at discharge. Request CM call crisis center to inquire about ride at discharge. She follows with Fred Echols CNP (added to epic). She denies discharge needs. Spoke with [facility] they can provide ride during daytime and evening [phone]. Primary RN updated. Transportation/Food Security/Housekeeping Addressed:  No issues identified.     Evaluation: no

## 2020-01-09 NOTE — ED NOTES
Bed: 010A  Expected date:   Expected time:   Means of arrival:   Comments:  [id number] [facility]     Kaycee Arnold RN  01/08/20 5418

## 2020-01-09 NOTE — ED NOTES
ED nurse-to-nurse bedside report    Chief Complaint   Patient presents with    Chest Pain      LOC: alert and orientated to name, place, date  Vital signs   Vitals:    01/08/20 2059 01/08/20 2211 01/08/20 2251 01/08/20 2300   BP:  (!) 149/85 135/72    Pulse: 126 140 131    Resp: 19 28 22    Temp:    101.7 °F (38.7 °C)   TempSrc:    Oral   SpO2: 97% 94% 92%    Weight:          Pain:    Pain Interventions: See MAR  Pain Goal: No pain  Oxygen: No    Current needs required telemetry   Telemetry: Yes  LDAs:   Peripheral IV 01/08/20 Right Antecubital (Active)   Site Assessment Clean;Dry; Intact 1/8/2020  9:45 PM   Line Status Blood return noted; Flushed;Normal saline locked 1/8/2020  9:45 PM   Dressing Status Clean;Dry; Intact 1/8/2020  9:45 PM   Dressing Intervention New 1/8/2020  9:45 PM     Continuous Infusions:  0.9 infusion  Bedside Report given to: Bubba Davis RN  01/08/20 9816

## 2020-01-10 ENCOUNTER — APPOINTMENT (OUTPATIENT)
Dept: GENERAL RADIOLOGY | Age: 25
DRG: 720 | End: 2020-01-10
Payer: MEDICAID

## 2020-01-10 LAB
ANION GAP SERPL CALCULATED.3IONS-SCNC: 10 MEQ/L (ref 8–16)
BASOPHILS # BLD: 0.6 %
BASOPHILS ABSOLUTE: 0 THOU/MM3 (ref 0–0.1)
BUN BLDV-MCNC: 5 MG/DL (ref 7–22)
CALCIUM SERPL-MCNC: 8.2 MG/DL (ref 8.5–10.5)
CHLORIDE BLD-SCNC: 107 MEQ/L (ref 98–111)
CO2: 21 MEQ/L (ref 23–33)
CREAT SERPL-MCNC: 0.7 MG/DL (ref 0.4–1.2)
EOSINOPHIL # BLD: 0.3 %
EOSINOPHILS ABSOLUTE: 0 THOU/MM3 (ref 0–0.4)
ERYTHROCYTE [DISTWIDTH] IN BLOOD BY AUTOMATED COUNT: 13.4 % (ref 11.5–14.5)
ERYTHROCYTE [DISTWIDTH] IN BLOOD BY AUTOMATED COUNT: 48.7 FL (ref 35–45)
GFR SERPL CREATININE-BSD FRML MDRD: > 90 ML/MIN/1.73M2
GLUCOSE BLD-MCNC: 91 MG/DL (ref 70–108)
HCT VFR BLD CALC: 37.3 % (ref 37–47)
HEMOGLOBIN: 11.6 GM/DL (ref 12–16)
IMMATURE GRANS (ABS): 0.07 THOU/MM3 (ref 0–0.07)
IMMATURE GRANULOCYTES: 0.9 %
LYMPHOCYTES # BLD: 22.3 %
LYMPHOCYTES ABSOLUTE: 1.8 THOU/MM3 (ref 1–4.8)
MCH RBC QN AUTO: 30.9 PG (ref 26–33)
MCHC RBC AUTO-ENTMCNC: 31.1 GM/DL (ref 32.2–35.5)
MCV RBC AUTO: 99.2 FL (ref 81–99)
MONOCYTES # BLD: 18.5 %
MONOCYTES ABSOLUTE: 1.5 THOU/MM3 (ref 0.4–1.3)
NUCLEATED RED BLOOD CELLS: 0 /100 WBC
PLATELET # BLD: 252 THOU/MM3 (ref 130–400)
PMV BLD AUTO: 9.4 FL (ref 9.4–12.4)
POTASSIUM SERPL-SCNC: 4.1 MEQ/L (ref 3.5–5.2)
RBC # BLD: 3.76 MILL/MM3 (ref 4.2–5.4)
SEG NEUTROPHILS: 57.4 %
SEGMENTED NEUTROPHILS ABSOLUTE COUNT: 4.5 THOU/MM3 (ref 1.8–7.7)
SODIUM BLD-SCNC: 138 MEQ/L (ref 135–145)
THROAT/NOSE CULTURE: NORMAL
WBC # BLD: 7.9 THOU/MM3 (ref 4.8–10.8)

## 2020-01-10 PROCEDURE — G0378 HOSPITAL OBSERVATION PER HR: HCPCS

## 2020-01-10 PROCEDURE — 99232 SBSQ HOSP IP/OBS MODERATE 35: CPT | Performed by: INTERNAL MEDICINE

## 2020-01-10 PROCEDURE — 1200000000 HC SEMI PRIVATE

## 2020-01-10 PROCEDURE — 2580000003 HC RX 258: Performed by: FAMILY MEDICINE

## 2020-01-10 PROCEDURE — 94640 AIRWAY INHALATION TREATMENT: CPT

## 2020-01-10 PROCEDURE — 6360000002 HC RX W HCPCS: Performed by: FAMILY MEDICINE

## 2020-01-10 PROCEDURE — 94760 N-INVAS EAR/PLS OXIMETRY 1: CPT

## 2020-01-10 PROCEDURE — 6370000000 HC RX 637 (ALT 250 FOR IP): Performed by: FAMILY MEDICINE

## 2020-01-10 PROCEDURE — 71046 X-RAY EXAM CHEST 2 VIEWS: CPT

## 2020-01-10 PROCEDURE — 96372 THER/PROPH/DIAG INJ SC/IM: CPT

## 2020-01-10 PROCEDURE — 85025 COMPLETE CBC W/AUTO DIFF WBC: CPT

## 2020-01-10 PROCEDURE — 36415 COLL VENOUS BLD VENIPUNCTURE: CPT

## 2020-01-10 PROCEDURE — 6360000002 HC RX W HCPCS: Performed by: INTERNAL MEDICINE

## 2020-01-10 PROCEDURE — 2700000000 HC OXYGEN THERAPY PER DAY

## 2020-01-10 PROCEDURE — 96361 HYDRATE IV INFUSION ADD-ON: CPT

## 2020-01-10 PROCEDURE — 80048 BASIC METABOLIC PNL TOTAL CA: CPT

## 2020-01-10 RX ORDER — SODIUM CHLORIDE 9 MG/ML
INJECTION, SOLUTION INTRAVENOUS CONTINUOUS
Status: DISCONTINUED | OUTPATIENT
Start: 2020-01-10 | End: 2020-01-11 | Stop reason: HOSPADM

## 2020-01-10 RX ORDER — NICOTINE 21 MG/24HR
1 PATCH, TRANSDERMAL 24 HOURS TRANSDERMAL DAILY
Status: DISCONTINUED | OUTPATIENT
Start: 2020-01-10 | End: 2020-01-11 | Stop reason: HOSPADM

## 2020-01-10 RX ADMIN — OSELTAMIVIR PHOSPHATE 75 MG: 75 CAPSULE ORAL at 20:31

## 2020-01-10 RX ADMIN — BUSPIRONE HYDROCHLORIDE 5 MG: 5 TABLET ORAL at 20:31

## 2020-01-10 RX ADMIN — SODIUM CHLORIDE: 9 INJECTION, SOLUTION INTRAVENOUS at 08:10

## 2020-01-10 RX ADMIN — GUAIFENESIN AND CODEINE PHOSPHATE 5 ML: 10; 100 LIQUID ORAL at 05:45

## 2020-01-10 RX ADMIN — BUSPIRONE HYDROCHLORIDE 5 MG: 5 TABLET ORAL at 08:10

## 2020-01-10 RX ADMIN — IPRATROPIUM BROMIDE 0.5 MG: 0.5 SOLUTION RESPIRATORY (INHALATION) at 13:34

## 2020-01-10 RX ADMIN — ENOXAPARIN SODIUM 40 MG: 40 INJECTION SUBCUTANEOUS at 12:16

## 2020-01-10 RX ADMIN — IPRATROPIUM BROMIDE 0.5 MG: 0.5 SOLUTION RESPIRATORY (INHALATION) at 06:04

## 2020-01-10 RX ADMIN — ACETAMINOPHEN 650 MG: 325 TABLET ORAL at 17:36

## 2020-01-10 RX ADMIN — Medication 10 ML: at 20:31

## 2020-01-10 RX ADMIN — ACETAMINOPHEN 650 MG: 325 TABLET ORAL at 05:45

## 2020-01-10 RX ADMIN — IBUPROFEN 400 MG: 400 TABLET ORAL at 08:10

## 2020-01-10 RX ADMIN — IPRATROPIUM BROMIDE 0.5 MG: 0.5 SOLUTION RESPIRATORY (INHALATION) at 20:44

## 2020-01-10 RX ADMIN — VENLAFAXINE HYDROCHLORIDE 37.5 MG: 37.5 CAPSULE, EXTENDED RELEASE ORAL at 08:10

## 2020-01-10 RX ADMIN — IPRATROPIUM BROMIDE 0.5 MG: 0.5 SOLUTION RESPIRATORY (INHALATION) at 17:03

## 2020-01-10 RX ADMIN — OSELTAMIVIR PHOSPHATE 75 MG: 75 CAPSULE ORAL at 08:10

## 2020-01-10 ASSESSMENT — PAIN SCALES - GENERAL
PAINLEVEL_OUTOF10: 0
PAINLEVEL_OUTOF10: 2
PAINLEVEL_OUTOF10: 4
PAINLEVEL_OUTOF10: 3

## 2020-01-10 ASSESSMENT — PAIN DESCRIPTION - DESCRIPTORS: DESCRIPTORS: ACHING

## 2020-01-10 ASSESSMENT — PAIN DESCRIPTION - PAIN TYPE: TYPE: ACUTE PAIN

## 2020-01-10 ASSESSMENT — PAIN DESCRIPTION - LOCATION: LOCATION: RIB CAGE

## 2020-01-10 ASSESSMENT — PAIN DESCRIPTION - FREQUENCY: FREQUENCY: CONTINUOUS

## 2020-01-10 ASSESSMENT — PAIN DESCRIPTION - ONSET: ONSET: ON-GOING

## 2020-01-10 ASSESSMENT — PAIN DESCRIPTION - ORIENTATION: ORIENTATION: RIGHT;LEFT

## 2020-01-10 NOTE — PLAN OF CARE
Problem: Pain:  Goal: Pain level will decrease  Description  Pain level will decrease  Outcome: Ongoing  Note:   Patient has ongoing rib pain from profound cough she has and Tylenol/Motrin effective in relieving this pain as well as cough syrup Guaifenesin with Codeine has decreased severity of cough. Problem: Respiratory:  Goal: Ability to maintain normal respiratory secretions will improve  Description  Ability to maintain normal respiratory secretions will improve  Outcome: Met This Shift  Note:   So far no sputum production     Problem: HEMODYNAMIC STATUS  Goal: Patient has stable vital signs and fluid balance  Outcome: Ongoing  Note:   B/p has been stable, VHR varies with temperature and temp has only been  tonight. Patient has IV N/S infusing 100ml/hr and patient goes to the bathroom to void just about every 2 hours. Problem: Activity:  Goal: Energy level will increase  Description  Energy level will increase  Outcome: Ongoing  Note:   Patient still pretty ill and sleeps most of time, but at least rib pain lessened tonight so she could sleep. Goal: Ability to return to normal activity level will improve  Description  Ability to return to normal activity level will improve  Outcome: Ongoing  Note:   Patient steady when walking to & from bathroom     Problem: Fluid Volume:  Goal: Maintenance of adequate hydration will improve  Description  Maintenance of adequate hydration will improve  Outcome: Ongoing  Note:   IV fluids continue as patient's appetite isn't much at this point. Problem: Health Behavior:  Goal: Adoption of positive health habits will improve  Description  Adoption of positive health habits will improve  Outcome: Ongoing  Note:   We did discuss getting flu shot and patient states she's never gotten one.   Goal: Compliance with immunization standards will improve  Description  Compliance with immunization standards will improve  Outcome: Ongoing  Note:   Initial discussion made     Problem: Physical Regulation:  Goal: Complications related to the disease process, condition or treatment will be avoided or minimized  Description  Complications related to the disease process, condition or treatment will be avoided or minimized  Outcome: Ongoing  Note:   O2 sats have dwindled so oxygen provided and discussed Incentive Spirometer & practiced. Goal: Ability to maintain clinical measurements within normal limits will improve  Description  Ability to maintain clinical measurements within normal limits will improve  Outcome: Ongoing  Goal: Signs and symptoms of infection will decrease  Description  Signs and symptoms of infection will decrease  Outcome: Ongoing     Problem: Sensory:  Goal: General experience of comfort will improve  Description  General experience of comfort will improve  Outcome: Ongoing     Care plan reviewed with patient. Patient verbalizes understanding of the care plan and contributed to goal setting.

## 2020-01-10 NOTE — CARE COORDINATION
1/10/20, 12:50 PM    DISCHARGE ONGOING EVALUATION:     Beckiejason Mary day: 0  Location: [facility]33/033-A Reason for admit: Tachycardia [R00.0]  Influenza A [J10.1]   Treatment Plan of Care: Hospitalist following. Incentive spirometer. IVF. Nebs. 96% on 2L-nurse attempting to wean. Anticipate discharge in 1 to 2 days. Will return to [facility], they are able to provide rides at certain times of day, contact information placed on chart, nurse updated. Barriers to Discharge: Await medical clearance.

## 2020-01-10 NOTE — PROGRESS NOTES
45.73.  on lifestyle modification. Blood sugars have remained normal.  11. Tobacco abuse: counseled on cessation. Starting nicotine patch. Expected discharge date:  1/11/2020    Disposition:    [] Home       [] TCU       [] Rehab       [] Psych       [] SNF       [] [facility]       [x] Other-    Chief Complaint: chest pain    Hospital Course: Li Ramos is a 30 y.o. female with anxiety d/o, bipolar d/o OA, fibromyalgia, +smoker presenting with lower bilateral chest and lower rib pain, f/c, congestion, myalgias, wheezing. Recent for pneumonia per ER visit 12/29/19 with zmax and was back to baseline prior to this presentation. Per Dr. Harris's note 1/8 states \"ED course: febrile (TMax 101.7 degrees), hypertensive, tachyardic (120s), RR 20s with SpO2 96% on RA. Workup remarkable for: WBC 13.3, CRP 1.96. Cr 0.9 / BUN 8 / eGFR 87. LA nml. Procal negative. Trop negative. Strep negative. Flu A positive. EKG: sinus tachycardia / no ischemic changes. CXR: NAP. Received 1L IVF bolus, tylenol x 1, toradol x 1, tamiflu x 1 / patient with persistent CP and tachycardia in the 130s despite these therapies. Hospitalist service contacted for admission. -- started on tamiflu, IVF and admitted for further monitoring. Subjective (past 24 hours): Patient states that she feels better this morning and her nausea feels better. Her chest hurts with coughing, but it helps her breathe better. She denies fatigue, SOB, abd pain, constipation, diarrhea, vomiting. Patient has been afebrile since 2330 on 1/9. Patient had a HR max of 110 BPM overnight, but is currently at 72 BPM. She reports that she lives at [facility], and will be returning there upon discharge.       Medications:  Reviewed    Infusion Medications    sodium chloride 100 mL/hr at 01/10/20 0810     Scheduled Medications    oseltamivir  75 mg Oral BID    ipratropium  0.5 mg Nebulization 4x daily    busPIRone  5 mg Oral BID    venlafaxine  37.5 mg Oral Daily with breakfast    sodium chloride flush  10 mL Intravenous 2 times per day     PRN Meds: acetaminophen, ibuprofen, guaiFENesin-codeine, sodium chloride flush, magnesium hydroxide, ondansetron, ipratropium      Intake/Output Summary (Last 24 hours) at 1/10/2020 1027  Last data filed at 1/10/2020 0956  Gross per 24 hour   Intake 4251.74 ml   Output 4000 ml   Net 251.74 ml       Diet:  DIET GENERAL;    Exam:  /69   Pulse 103   Temp 98.3 °F (36.8 °C) (Oral)   Resp 18   Wt 252 lb 8 oz (114.5 kg)   SpO2 96%   BMI 46.18 kg/m²     General appearance: No apparent distress, appears stated age and cooperative. HEENT: Pupils equal, round, and reactive to light. Conjunctivae/corneas clear. Neck: Supple, with full range of motion. No jugular venous distention. Trachea midline. Respiratory:  Normal respiratory effort. Expiratory wheezes at the end of expiration. Cardiovascular: Regular rate and rhythm with normal S1/S2 without murmurs, rubs or gallops. Abdomen: Soft, non-tender, non-distended with normal bowel sounds. Musculoskeletal: passive and active ROM x 4 extremities. Skin: Skin color, texture, turgor normal.    Neurologic:  Neurovascularly intact without any focal sensory/motor deficits.  Cranial nerves: II-XII intact, grossly non-focal.  Psychiatric: Alert and oriented, thought content appropriate  Capillary Refill: Brisk,< 3 seconds   Peripheral Pulses: +2 palpable, equal bilaterally       Labs:   Recent Labs     01/08/20  2054 01/09/20  0352 01/10/20  0346   WBC 13.3* 10.6 7.9   HGB 13.5 11.7* 11.6*   HCT 42.1 36.9* 37.3    302 252     Recent Labs     01/08/20  2054 01/09/20  0352 01/10/20  0346    138 138   K 4.8 4.3 4.1    106 107   CO2 23 20* 21*   BUN 8 10 5*   CREATININE 0.9 0.9 0.7   CALCIUM 9.4 7.9* 8.2*     Recent Labs     01/08/20 2054   AST 17   ALT 21   BILITOT <0.2*   ALKPHOS 108     No results for input(s): INR in the last 72

## 2020-01-11 VITALS
RESPIRATION RATE: 19 BRPM | SYSTOLIC BLOOD PRESSURE: 128 MMHG | TEMPERATURE: 97.7 F | DIASTOLIC BLOOD PRESSURE: 93 MMHG | HEART RATE: 95 BPM | BODY MASS INDEX: 46.9 KG/M2 | WEIGHT: 256.4 LBS | OXYGEN SATURATION: 94 %

## 2020-01-11 PROCEDURE — 2700000000 HC OXYGEN THERAPY PER DAY

## 2020-01-11 PROCEDURE — 96372 THER/PROPH/DIAG INJ SC/IM: CPT

## 2020-01-11 PROCEDURE — 94760 N-INVAS EAR/PLS OXIMETRY 1: CPT

## 2020-01-11 PROCEDURE — G0378 HOSPITAL OBSERVATION PER HR: HCPCS

## 2020-01-11 PROCEDURE — 99238 HOSP IP/OBS DSCHRG MGMT 30/<: CPT | Performed by: INTERNAL MEDICINE

## 2020-01-11 PROCEDURE — 6370000000 HC RX 637 (ALT 250 FOR IP): Performed by: FAMILY MEDICINE

## 2020-01-11 PROCEDURE — 6360000002 HC RX W HCPCS: Performed by: INTERNAL MEDICINE

## 2020-01-11 PROCEDURE — 6360000002 HC RX W HCPCS: Performed by: FAMILY MEDICINE

## 2020-01-11 PROCEDURE — 94640 AIRWAY INHALATION TREATMENT: CPT

## 2020-01-11 RX ORDER — OSELTAMIVIR PHOSPHATE 75 MG/1
75 CAPSULE ORAL 2 TIMES DAILY
Qty: 8 CAPSULE | Refills: 0 | Status: SHIPPED | OUTPATIENT
Start: 2020-01-11 | End: 2020-01-15

## 2020-01-11 RX ORDER — GUAIFENESIN/DEXTROMETHORPHAN 100-10MG/5
5 SYRUP ORAL 3 TIMES DAILY PRN
Qty: 120 ML | COMMUNITY
Start: 2020-01-11 | End: 2020-01-21

## 2020-01-11 RX ORDER — NICOTINE 21 MG/24HR
1 PATCH, TRANSDERMAL 24 HOURS TRANSDERMAL DAILY
Qty: 30 PATCH | Refills: 3 | COMMUNITY
Start: 2020-01-11 | End: 2020-11-02 | Stop reason: ALTCHOICE

## 2020-01-11 RX ADMIN — ACETAMINOPHEN 650 MG: 325 TABLET ORAL at 00:02

## 2020-01-11 RX ADMIN — IPRATROPIUM BROMIDE 0.5 MG: 0.5 SOLUTION RESPIRATORY (INHALATION) at 07:27

## 2020-01-11 RX ADMIN — OSELTAMIVIR PHOSPHATE 75 MG: 75 CAPSULE ORAL at 09:13

## 2020-01-11 RX ADMIN — VENLAFAXINE HYDROCHLORIDE 37.5 MG: 37.5 CAPSULE, EXTENDED RELEASE ORAL at 09:13

## 2020-01-11 RX ADMIN — ENOXAPARIN SODIUM 40 MG: 40 INJECTION SUBCUTANEOUS at 00:02

## 2020-01-11 RX ADMIN — BUSPIRONE HYDROCHLORIDE 5 MG: 5 TABLET ORAL at 09:13

## 2020-01-11 ASSESSMENT — PAIN DESCRIPTION - PAIN TYPE
TYPE: OTHER (COMMENT)
TYPE: OTHER (COMMENT)

## 2020-01-11 ASSESSMENT — PAIN DESCRIPTION - FREQUENCY: FREQUENCY: INTERMITTENT

## 2020-01-11 ASSESSMENT — PAIN DESCRIPTION - DESCRIPTORS: DESCRIPTORS: ACHING

## 2020-01-11 ASSESSMENT — PAIN DESCRIPTION - ORIENTATION: ORIENTATION: LEFT

## 2020-01-11 ASSESSMENT — PAIN DESCRIPTION - LOCATION
LOCATION: RIB CAGE
LOCATION: RIB CAGE

## 2020-01-11 ASSESSMENT — PAIN SCALES - GENERAL
PAINLEVEL_OUTOF10: 4

## 2020-01-11 ASSESSMENT — PAIN DESCRIPTION - ONSET: ONSET: ON-GOING

## 2020-01-11 ASSESSMENT — PAIN - FUNCTIONAL ASSESSMENT: PAIN_FUNCTIONAL_ASSESSMENT: ACTIVITIES ARE NOT PREVENTED

## 2020-01-11 NOTE — DISCHARGE SUMMARY
Hospital Medicine Discharge Summary      Patient Identification:   Olya Sarmiento   : 1995  MRN: [id number]   Account: [de-identified]      Patient's PCP: NAMAN Scott CNP    Admit Date: 2020     Discharge Date: 2020      Admitting Physician: Ad Gamez MD     Discharge Physician: Ad Gamez MD     Discharge Diagnoses: Active Hospital Problems    Diagnosis Date Noted    Influenza A [J10.1]     Chest pain [R07.9]     Sepsis ([facility] [A41.9]     Normocytic anemia [D64.9]     Bilateral atelectasis [J98.11]     Atypical chest pain [R07.89]     Morbid obesity ([facility] [E66.01]     B[facility] disorder ([facility] [F31.9]     Tobacco abuse [Z72.0]     Tachycardia [R00.0] 2020       The patient was seen and examined on day of discharge and this discharge summary is in conjunction with any daily progress note from day of discharge. Hospital Course:   Olya Sarmiento is a 30 y.o. female admitted to [address] [place] [address] on 2020 for chest pain, chills, congestion, cough; she had presented to ER several days prior and had a negative flu test.  She presented again with above sxs and this time the flu test was positive and she was admitted. She met criteria for SIRS with sepsis from influenza. She was admitted and treated with IV fluids, oxygen and Tamiflu. Her course was fairly benign. She was afebrile at discharge and her wbc normalized. She was discharged with the remaining tamiflu course. She lives at [facility] and was advised to avoid exposure to other residents. She is mildly anemic presumably due to menstrual periods and may benefit from iron therapy.  .              Exam:     Vitals:  Vitals:    20 0000 20 0422 20 0727 20 0900   BP:  (!) 128/93     Pulse: 92 95     Resp:  19     Temp: 98.7 °F (37.1 °C) 97.7 °F (36.5 °C)     TempSrc: Oral Oral     SpO2:  94% 94% 94%   Weight:  256 lb 6.4 oz (116.3 kg)       Weight: Weight: atelectasis. **This report has been created using voice recognition software. It may contain minor errors which are inherent in voice recognition technology. **      Final report electronically signed by Dr. Juan Chicas on 1/10/2020 6:56 AM      XR CHEST STANDARD (2 VW)   Final Result      No acute pneumonia or pulmonary edema. Mild left upper lobe and right middle lobe atelectasis. **This report has been created using voice recognition software. It may contain minor errors which are inherent in voice recognition technology. **      Final report electronically signed by Dr. Juan Chicas on 1/8/2020 9:35 PM             Consults:     None    Disposition: [place]  Condition at Discharge: Stable    Code Status:  Full Code     Patient Instructions:    Discharge lab work: Activity: activity as tolerated  Diet: DIET GENERAL;      Follow-up visits:   NAMAN Griffin CNP  [address]  [phone]    Schedule an appointment as soon as possible for a visit in 1 week  Message left for Dr. Parish Quintanilla on office recording machine. Discharge Medications:      Brynn Desai   Home Medication Instructions EEV:[id number]    Printed on:01/11/20 142   Medication Information                      albuterol sulfate HFA (PROVENTIL HFA) 108 (90 Base) MCG/ACT inhaler  Inhale 2 puffs into the lungs every 4 hours as needed for Wheezing or Shortness of Breath (Space out to every 6 hours as symptoms improve) Space out to every 6 hours as symptoms improve.              busPIRone (BUSPAR) 5 MG tablet  Take 1 tablet by mouth 2 times daily             guaiFENesin-dextromethorphan (ROBITUSSIN DM) 100-10 MG/5ML syrup  Take 5 mLs by mouth 3 times daily as needed for Cough             Multiple Vitamins-Minerals (WOMENS DAILY FORMULA PO)  Take by mouth             naproxen (NAPROSYN) 500 MG tablet  Take 1 tablet by mouth 2 times daily             nicotine (NICODERM CQ) 21

## 2020-01-11 NOTE — PROGRESS NOTES
1203- The pt. Has received her lunch and was able to eat. Discharge instrcutions given to the pt. She is slow with actions and reactions but she verblaized understanding. She was able to get herself dressed. Face masks were given to her to use when out in the populatin. 1225- Taken by wheelchair to [facility]. Pt. Russell.

## 2020-01-11 NOTE — PLAN OF CARE
Problem: RESPIRATORY  Goal: Clear lung sounds  1/11/2020 0734 by Kaelyn Shirley RCP  Outcome: Ongoing     Improve breath sounds, increase aeration and decrease WOB.

## 2020-01-11 NOTE — PROGRESS NOTES
Dr. Arin Huang has been in to see the pt. Today. Discharge orders received. 1100- INT needle removed. Telemetry has been taken off.  1100- call placed to [employer] requesting [employer]. 1110- spoke with Heidi Mckeon at [facility] to confirm approval for the pt to return. She said that she would call the house and left them know.   1135Checklist for discharge was then faxed to [facility]

## 2020-01-11 NOTE — DISCHARGE INSTR - ACTIVITY
Activity as tolerated. Take rest periods until you are feeling better.   Wear face mask when in public/community for a few more days

## 2020-01-14 LAB
BLOOD CULTURE, ROUTINE: NORMAL
BLOOD CULTURE, ROUTINE: NORMAL

## 2020-01-22 ENCOUNTER — HOSPITAL ENCOUNTER (EMERGENCY)
Age: 25
Discharge: HOME | End: 2020-01-22
Payer: MEDICAID

## 2020-01-22 ENCOUNTER — APPOINTMENT (OUTPATIENT)
Dept: GENERAL RADIOLOGY | Age: 25
End: 2020-01-22
Payer: MEDICAID

## 2020-01-22 VITALS
HEART RATE: 71 BPM | RESPIRATION RATE: 16 BRPM | HEIGHT: 62 IN | WEIGHT: 250 LBS | TEMPERATURE: 97.7 F | SYSTOLIC BLOOD PRESSURE: 118 MMHG | DIASTOLIC BLOOD PRESSURE: 86 MMHG | OXYGEN SATURATION: 93 % | BODY MASS INDEX: 46.01 KG/M2

## 2020-01-22 PROCEDURE — 96372 THER/PROPH/DIAG INJ SC/IM: CPT

## 2020-01-22 PROCEDURE — 71046 X-RAY EXAM CHEST 2 VIEWS: CPT

## 2020-01-22 PROCEDURE — 6360000002 HC RX W HCPCS: Performed by: NURSE PRACTITIONER

## 2020-01-22 PROCEDURE — 99283 EMERGENCY DEPT VISIT LOW MDM: CPT

## 2020-01-22 RX ORDER — NAPROXEN 500 MG/1
500 TABLET ORAL 2 TIMES DAILY PRN
Qty: 60 TABLET | Refills: 0 | Status: SHIPPED | OUTPATIENT
Start: 2020-01-22 | End: 2021-01-20 | Stop reason: ALTCHOICE

## 2020-01-22 RX ORDER — KETOROLAC TROMETHAMINE 30 MG/ML
30 INJECTION, SOLUTION INTRAMUSCULAR; INTRAVENOUS ONCE
Status: COMPLETED | OUTPATIENT
Start: 2020-01-22 | End: 2020-01-22

## 2020-01-22 RX ADMIN — KETOROLAC TROMETHAMINE 30 MG: 30 INJECTION, SOLUTION INTRAMUSCULAR at 13:33

## 2020-01-22 ASSESSMENT — PAIN DESCRIPTION - FREQUENCY: FREQUENCY: INTERMITTENT

## 2020-01-22 ASSESSMENT — ENCOUNTER SYMPTOMS
SORE THROAT: 0
ABDOMINAL PAIN: 0
CONSTIPATION: 0
DIARRHEA: 0
VOMITING: 0
SHORTNESS OF BREATH: 0
RHINORRHEA: 0
COUGH: 1
NAUSEA: 1

## 2020-01-22 ASSESSMENT — PAIN SCALES - GENERAL
PAINLEVEL_OUTOF10: 7
PAINLEVEL_OUTOF10: 7

## 2020-01-22 ASSESSMENT — PAIN DESCRIPTION - PAIN TYPE: TYPE: ACUTE PAIN

## 2020-01-22 ASSESSMENT — PAIN DESCRIPTION - DESCRIPTORS: DESCRIPTORS: SHARP

## 2020-01-22 ASSESSMENT — PAIN DESCRIPTION - ORIENTATION: ORIENTATION: RIGHT

## 2020-01-22 ASSESSMENT — PAIN DESCRIPTION - LOCATION: LOCATION: RIB CAGE

## 2020-01-22 NOTE — ED PROVIDER NOTES
evaluated. MDM:  Patient seen and evaluated for right rib pain. Patient recently had admission for influenza A and possible viral pneumonia. Patient denied new injury to area. On my initial physical assessment, patient was in no acute distress. Patient's pain was treated appropriately, repeat imaging was ordered. Patient found to have improving chest x-ray from previous. Patient's condition was observed to remain stable throughout ED stay. I believe that this is likely residual pain from previous infiltrate/costochondritis from coughing. I discussed findings with patient and plan to discharge. Patient encouraged to follow-up with PCP and to return to the emergency department if she develops worsening shortness of breath, fever, more severe pain. Patient was amenable to plan for discharge and departed the ED in stable condition discharge    CRITICAL CARE:   None     CONSULTS:  None    PROCEDURES:  None    FINAL IMPRESSION      1. Chest wall pain          DISPOSITION/PLAN   Discharge    PATIENT REFERRED TO:  NAMAN Melgar CNP  [address] [place] [address]  [phone]    Go to   As needed      DISCHARGE MEDICATIONS:  Discharge Medication List as of 1/22/2020  2:24 PM      START taking these medications    Details   !! naproxen (NAPROSYN) 500 MG tablet Take 1 tablet by mouth 2 times daily as needed for Pain, Disp-60 tablet, R-0Print       !! - Potential duplicate medications found. Please discuss with provider. (Please note that portions of this note were completed with a voice recognition program.  Efforts were made to edit the dictations but occasionally words are mis-transcribed.)    The patient was given an opportunity to see the Emergency Attending. The patient voiced understanding that I was a Mid-LevelProvider and was in agreement with being seen independently by myself.      Scribe:  Kendall Kingston 1/22/20 1:27 PM Scribing for and in the presence of [employer],

## 2020-01-30 ENCOUNTER — HOSPITAL ENCOUNTER (EMERGENCY)
Age: 25
Discharge: HOME | End: 2020-01-30
Attending: EMERGENCY MEDICINE
Payer: MEDICAID

## 2020-01-30 VITALS
SYSTOLIC BLOOD PRESSURE: 134 MMHG | HEART RATE: 86 BPM | OXYGEN SATURATION: 95 % | HEIGHT: 63 IN | WEIGHT: 250 LBS | TEMPERATURE: 98.3 F | RESPIRATION RATE: 16 BRPM | DIASTOLIC BLOOD PRESSURE: 70 MMHG | BODY MASS INDEX: 44.3 KG/M2

## 2020-01-30 LAB
ANION GAP SERPL CALCULATED.3IONS-SCNC: 14 MEQ/L (ref 8–16)
BACTERIA: ABNORMAL
BASOPHILS # BLD: 0.3 %
BASOPHILS ABSOLUTE: 0.1 THOU/MM3 (ref 0–0.1)
BILIRUBIN URINE: NEGATIVE
BLOOD, URINE: ABNORMAL
BUN BLDV-MCNC: 6 MG/DL (ref 7–22)
CALCIUM SERPL-MCNC: 9.3 MG/DL (ref 8.5–10.5)
CASTS: ABNORMAL /LPF
CASTS: ABNORMAL /LPF
CHARACTER, URINE: CLEAR
CHLORIDE BLD-SCNC: 101 MEQ/L (ref 98–111)
CO2: 20 MEQ/L (ref 23–33)
COLOR: YELLOW
CREAT SERPL-MCNC: 0.6 MG/DL (ref 0.4–1.2)
CRYSTALS: ABNORMAL
EKG ATRIAL RATE: 113 BPM
EKG P AXIS: 44 DEGREES
EKG P-R INTERVAL: 134 MS
EKG Q-T INTERVAL: 318 MS
EKG QRS DURATION: 92 MS
EKG QTC CALCULATION (BAZETT): 436 MS
EKG R AXIS: 17 DEGREES
EKG T AXIS: 26 DEGREES
EKG VENTRICULAR RATE: 113 BPM
EOSINOPHIL # BLD: 0.4 %
EOSINOPHILS ABSOLUTE: 0.1 THOU/MM3 (ref 0–0.4)
EPITHELIAL CELLS, UA: ABNORMAL /HPF
ERYTHROCYTE [DISTWIDTH] IN BLOOD BY AUTOMATED COUNT: 12.5 % (ref 11.5–14.5)
ERYTHROCYTE [DISTWIDTH] IN BLOOD BY AUTOMATED COUNT: 43.1 FL (ref 35–45)
FLU A ANTIGEN: NEGATIVE
FLU B ANTIGEN: NEGATIVE
GFR SERPL CREATININE-BSD FRML MDRD: > 90 ML/MIN/1.73M2
GLUCOSE BLD-MCNC: 121 MG/DL (ref 70–108)
GLUCOSE, URINE: NEGATIVE MG/DL
HCT VFR BLD CALC: 40.3 % (ref 37–47)
HEMOGLOBIN: 13.1 GM/DL (ref 12–16)
IMMATURE GRANS (ABS): 0.11 THOU/MM3 (ref 0–0.07)
IMMATURE GRANULOCYTES: 0.6 %
KETONES, URINE: NEGATIVE
LEUKOCYTE EST, POC: NEGATIVE
LYMPHOCYTES # BLD: 16.1 %
LYMPHOCYTES ABSOLUTE: 3.1 THOU/MM3 (ref 1–4.8)
MCH RBC QN AUTO: 30.9 PG (ref 26–33)
MCHC RBC AUTO-ENTMCNC: 32.5 GM/DL (ref 32.2–35.5)
MCV RBC AUTO: 95 FL (ref 81–99)
MISCELLANEOUS LAB TEST RESULT: ABNORMAL
MONOCYTES # BLD: 6.8 %
MONOCYTES ABSOLUTE: 1.3 THOU/MM3 (ref 0.4–1.3)
NITRITE, URINE: NEGATIVE
NUCLEATED RED BLOOD CELLS: 0 /100 WBC
OSMOLALITY CALCULATION: 269 MOSMOL/KG (ref 275–300)
PH UA: 6 (ref 5–9)
PLATELET # BLD: 365 THOU/MM3 (ref 130–400)
PMV BLD AUTO: 9.7 FL (ref 9.4–12.4)
POTASSIUM SERPL-SCNC: 4.2 MEQ/L (ref 3.5–5.2)
PREGNANCY, SERUM: NEGATIVE
PROTEIN UA: NEGATIVE MG/DL
RBC # BLD: 4.24 MILL/MM3 (ref 4.2–5.4)
RBC URINE: ABNORMAL /HPF
RENAL EPITHELIAL, UA: ABNORMAL
SEG NEUTROPHILS: 75.8 %
SEGMENTED NEUTROPHILS ABSOLUTE COUNT: 14.8 THOU/MM3 (ref 1.8–7.7)
SODIUM BLD-SCNC: 135 MEQ/L (ref 135–145)
SPECIFIC GRAVITY UA: 1.01 (ref 1–1.03)
UROBILINOGEN, URINE: 0.2 EU/DL (ref 0–1)
WBC # BLD: 19.5 THOU/MM3 (ref 4.8–10.8)
WBC UA: ABNORMAL /HPF
YEAST: ABNORMAL

## 2020-01-30 PROCEDURE — 96375 TX/PRO/DX INJ NEW DRUG ADDON: CPT

## 2020-01-30 PROCEDURE — 81001 URINALYSIS AUTO W/SCOPE: CPT

## 2020-01-30 PROCEDURE — 85025 COMPLETE CBC W/AUTO DIFF WBC: CPT

## 2020-01-30 PROCEDURE — 96372 THER/PROPH/DIAG INJ SC/IM: CPT

## 2020-01-30 PROCEDURE — 99285 EMERGENCY DEPT VISIT HI MDM: CPT

## 2020-01-30 PROCEDURE — 96366 THER/PROPH/DIAG IV INF ADDON: CPT

## 2020-01-30 PROCEDURE — 84703 CHORIONIC GONADOTROPIN ASSAY: CPT

## 2020-01-30 PROCEDURE — 96376 TX/PRO/DX INJ SAME DRUG ADON: CPT

## 2020-01-30 PROCEDURE — 2580000003 HC RX 258: Performed by: EMERGENCY MEDICINE

## 2020-01-30 PROCEDURE — 36415 COLL VENOUS BLD VENIPUNCTURE: CPT

## 2020-01-30 PROCEDURE — 6360000002 HC RX W HCPCS: Performed by: EMERGENCY MEDICINE

## 2020-01-30 PROCEDURE — 96365 THER/PROPH/DIAG IV INF INIT: CPT

## 2020-01-30 PROCEDURE — 80048 BASIC METABOLIC PNL TOTAL CA: CPT

## 2020-01-30 PROCEDURE — 87804 INFLUENZA ASSAY W/OPTIC: CPT

## 2020-01-30 PROCEDURE — 93005 ELECTROCARDIOGRAM TRACING: CPT | Performed by: EMERGENCY MEDICINE

## 2020-01-30 PROCEDURE — 93010 ELECTROCARDIOGRAM REPORT: CPT | Performed by: NUCLEAR MEDICINE

## 2020-01-30 PROCEDURE — 6370000000 HC RX 637 (ALT 250 FOR IP): Performed by: EMERGENCY MEDICINE

## 2020-01-30 RX ORDER — METOCLOPRAMIDE HYDROCHLORIDE 5 MG/ML
10 INJECTION INTRAMUSCULAR; INTRAVENOUS ONCE
Status: CANCELLED | OUTPATIENT
Start: 2020-01-30 | End: 2020-01-30

## 2020-01-30 RX ORDER — DIPHENHYDRAMINE HYDROCHLORIDE 50 MG/ML
50 INJECTION INTRAMUSCULAR; INTRAVENOUS ONCE
Status: COMPLETED | OUTPATIENT
Start: 2020-01-30 | End: 2020-01-30

## 2020-01-30 RX ORDER — 0.9 % SODIUM CHLORIDE 0.9 %
30 INTRAVENOUS SOLUTION INTRAVENOUS ONCE
Status: CANCELLED | OUTPATIENT
Start: 2020-01-30 | End: 2020-01-30

## 2020-01-30 RX ORDER — 0.9 % SODIUM CHLORIDE 0.9 %
1000 INTRAVENOUS SOLUTION INTRAVENOUS ONCE
Status: COMPLETED | OUTPATIENT
Start: 2020-01-30 | End: 2020-01-30

## 2020-01-30 RX ORDER — METOCLOPRAMIDE HYDROCHLORIDE 5 MG/ML
10 INJECTION INTRAMUSCULAR; INTRAVENOUS ONCE
Status: COMPLETED | OUTPATIENT
Start: 2020-01-30 | End: 2020-01-30

## 2020-01-30 RX ORDER — KETOROLAC TROMETHAMINE 30 MG/ML
15 INJECTION, SOLUTION INTRAMUSCULAR; INTRAVENOUS ONCE
Status: COMPLETED | OUTPATIENT
Start: 2020-01-30 | End: 2020-01-30

## 2020-01-30 RX ORDER — BUTALBITAL, ACETAMINOPHEN AND CAFFEINE 50; 325; 40 MG/1; MG/1; MG/1
2 TABLET ORAL ONCE
Status: COMPLETED | OUTPATIENT
Start: 2020-01-30 | End: 2020-01-30

## 2020-01-30 RX ORDER — MAGNESIUM SULFATE IN WATER 40 MG/ML
2 INJECTION, SOLUTION INTRAVENOUS ONCE
Status: COMPLETED | OUTPATIENT
Start: 2020-01-30 | End: 2020-01-30

## 2020-01-30 RX ORDER — PROMETHAZINE HYDROCHLORIDE 25 MG/ML
6.25 INJECTION, SOLUTION INTRAMUSCULAR; INTRAVENOUS ONCE
Status: COMPLETED | OUTPATIENT
Start: 2020-01-30 | End: 2020-01-30

## 2020-01-30 RX ORDER — PROMETHAZINE HYDROCHLORIDE 25 MG/ML
6.25 INJECTION, SOLUTION INTRAMUSCULAR; INTRAVENOUS ONCE
Status: DISCONTINUED | OUTPATIENT
Start: 2020-01-30 | End: 2020-01-30

## 2020-01-30 RX ADMIN — KETOROLAC TROMETHAMINE 15 MG: 30 INJECTION, SOLUTION INTRAMUSCULAR at 10:10

## 2020-01-30 RX ADMIN — DIPHENHYDRAMINE HYDROCHLORIDE 50 MG: 50 INJECTION INTRAMUSCULAR; INTRAVENOUS at 10:08

## 2020-01-30 RX ADMIN — MAGNESIUM SULFATE HEPTAHYDRATE 2 G: 40 INJECTION, SOLUTION INTRAVENOUS at 12:39

## 2020-01-30 RX ADMIN — METOCLOPRAMIDE 10 MG: 5 INJECTION, SOLUTION INTRAMUSCULAR; INTRAVENOUS at 13:54

## 2020-01-30 RX ADMIN — KETOROLAC TROMETHAMINE 15 MG: 30 INJECTION, SOLUTION INTRAMUSCULAR at 13:52

## 2020-01-30 RX ADMIN — SODIUM CHLORIDE 1000 ML: 9 INJECTION, SOLUTION INTRAVENOUS at 10:08

## 2020-01-30 RX ADMIN — PROMETHAZINE HYDROCHLORIDE 6.25 MG: 25 INJECTION INTRAMUSCULAR; INTRAVENOUS at 10:46

## 2020-01-30 RX ADMIN — BUTALBITAL, ACETAMINOPHEN, AND CAFFEINE 2 TABLET: 50; 325; 40 TABLET ORAL at 12:36

## 2020-01-30 ASSESSMENT — ENCOUNTER SYMPTOMS
BLOOD IN STOOL: 0
SHORTNESS OF BREATH: 0
DIARRHEA: 0
SINUS PAIN: 0
NAUSEA: 0
RHINORRHEA: 0
CHEST TIGHTNESS: 0
VOMITING: 0
ABDOMINAL PAIN: 0
SINUS PRESSURE: 0
BACK PAIN: 0
COUGH: 0
CONSTIPATION: 0
EYE PAIN: 0
RECTAL PAIN: 0

## 2020-01-30 ASSESSMENT — PAIN DESCRIPTION - PAIN TYPE
TYPE: ACUTE PAIN

## 2020-01-30 ASSESSMENT — PAIN SCALES - GENERAL
PAINLEVEL_OUTOF10: 6
PAINLEVEL_OUTOF10: 8
PAINLEVEL_OUTOF10: 8
PAINLEVEL_OUTOF10: 4
PAINLEVEL_OUTOF10: 5

## 2020-01-30 ASSESSMENT — PAIN DESCRIPTION - DESCRIPTORS
DESCRIPTORS: HEADACHE
DESCRIPTORS: HEADACHE
DESCRIPTORS: HEAVINESS
DESCRIPTORS: HEADACHE

## 2020-01-30 ASSESSMENT — PAIN DESCRIPTION - FREQUENCY
FREQUENCY: CONTINUOUS

## 2020-01-30 ASSESSMENT — PAIN DESCRIPTION - LOCATION
LOCATION: HEAD

## 2020-01-30 NOTE — ED NOTES
Patient resting quietly in cot with eyes closed. Wakes to voice. Rates pain 5/10 and falls back asleep quickly. Shows no signs of distress. Updated on POC. Will continue to monitor.       Iris Eckert RN  01/30/20 6971 Postpartum Vaginal Delivery Instructions    Activity       Ask family and friends for help when you need it.    Do not place anything in your vagina for 6 weeks.    You are not restricted on other activities, but take it easy for a few weeks to allow your body to recover from delivery.  You are able to do any activities you feel up to that point.    No driving until you have stopped taking your pain medications (usually two weeks after delivery).     Call your health care provider if you have any of these symptoms:       Increased pain, swelling, redness, or fluid around your stiches from an episiotomy or perineal tear.    A fever above 100.4 F (38 C) with or without chills when placing a thermometer under your tongue.    You soak a sanitary pad with blood within 1 hour, or you see blood clots larger than a golf ball.    Bleeding that lasts more than 6 weeks.    Vaginal discharge that smells bad.    Severe pain, cramping or tenderness in your lower belly area.    A need to urinate more frequently (use the toilet more often), more urgently (use the toilet very quickly), or it burns when you urinate.    Nausea and vomiting.    Redness, swelling or pain around a vein in your leg.    Problems breastfeeding or a red or painful area on your breast.    Chest pain and cough or are gasping for air.    Problems coping with sadness, anxiety, or depression.  If you have any concerns about hurting yourself or the baby, call your provider immediately.     You have questions or concerns after you return home.     Keep your hands clean:  Always wash your hands before touching your perineal area and stitches.  This helps reduce your risk of infection.  If your hands aren't dirty, you may use an alcohol hand-rub to clean your hands. Keep your nails clean and short.

## 2020-01-30 NOTE — ED NOTES
Patient resting quietly in cot showing no signs of distress at this time. Medicated per MAR. Updated on POC. Will continue to monitor.       Levi Gomez RN  01/30/20 5021

## 2020-01-30 NOTE — ED NOTES
**This is a Medical/ PA/ APRN Student Note and is charted for educational purposes. The non-physician staff attested note is not to be used for billing purposes or to guide patient care. Please see the physician modifications/ attestation for treatment plan/suggestions. This note has been reviewed and feedback has been provided to the student. **    Dr. Antonio Terry  ED visit Note  Pt Name: Erum Passer Record Number: [id number]  Date of Birth 1995   Today's Date: 1/30/2020    CHIEF COMPLAINT:     Chief Complaint   Patient presents with    Headache       Tricia Urias is a 30 y.o. female who presents to the ED c/o HA that started last night, described as throbbing. Lights do bother the pt some. HA is located bilateral at pts temples. Sound does not bother pt. Denies hx of migraines but has gotten Has in the past. Pt has some neck pain. Pt had trouble sleeping last night. Pt lives in a group home, no one there has similar sx. Denies rash, fever, chills. Pt had some blurry vision this morning that has since subsided. Pt has not taken any medications for the pain. Denies nausea, vomiting, abd pain, diarrhea. Denies feelings of anxiety. Denies lightheadedness and dizziness. Denies chest pain, heart palpitations, lower leg edema. Denies dysuria and hematuria. Pt has been able to walk okay since sx onset, denies weakness. Denies changes in hearing. REVIEW OF SYSTEMS:    Review of Systems   Constitutional: Negative for chills and fever. HENT: Negative for congestion and rhinorrhea. Eyes: Positive for visual disturbance. Respiratory: Negative for cough and shortness of breath. Cardiovascular: Negative for chest pain, palpitations and leg swelling. Gastrointestinal: Negative for abdominal pain, diarrhea, nausea and vomiting.    Genitourinary: Negative for dysuria and hematuria. Musculoskeletal: Negative for back pain. Skin: Negative for rash. Neurological: Positive for headaches. Negative for dizziness, syncope, weakness and light-headedness. PAST MEDICAL HISTORY:     Past Medical History:   Diagnosis Date    Arthritis     Bipolar 1 disorder ([address]     Fibromyalgia          SURGICAL HISTORY:     Past Surgical History:   Procedure Laterality Date    WISDOM TOOTH EXTRACTION         MEDICATIONS   Scheduled Meds:  Continuous Infusions:  PRN Meds:. ALLERGIES:   No Known Allergies    FAMILY HISTORY:   History reviewed. No pertinent family history. SOCIAL HISTORY:     Social History     Tobacco Use    Smoking status: Current Every Day Smoker     Packs/day: 1.00     Types: Cigarettes    Smokeless tobacco: Never Used   Substance Use Topics    Alcohol use: Not Currently         PREVIOUS RECORDS REVIEWED:         VITAL SIGNS   CURRENT VITALS:  height is 5' 3\" (1.6 m) and weight is 250 lb (113.4 kg). Her oral temperature is 98.3 °F (36.8 °C). Her blood pressure is 146/85 (abnormal) and her pulse is 129. Her respiration is 18 and oxygen saturation is 96%. Temperature Range (24h):Temp: 98.3 °F (36.8 °C) Temp  Av.3 °F (36.8 °C)  Min: 98.3 °F (36.8 °C)  Max: 98.3 °F (36.8 °C)  BP Range (40H): Systolic (08MLJ), TTQ:231 , Min:146 , PVE:554     Diastolic (49GHX), YQP:40, Min:85, Max:85    Pulse Range (24h): Pulse  Av  Min: 129  Max: 129  Respiration Range (24h): Resp  Av  Min: 18  Max: 18  Current Pulse Ox (24h):  SpO2: 96 %  Pulse Ox Range (24h):  SpO2  Av %  Min: 96 %  Max: 96 %  Oxygen Amount and Delivery:    Vital signs are abnormal from tachycardia. Pulsoximetry is normal.    PHYSICAL EXAM:   Physical Exam  Vitals signs reviewed. HENT:      Head: Normocephalic and atraumatic. Neck:      Musculoskeletal: Normal range of motion. No neck rigidity or muscular tenderness. Cardiovascular:      Rate and Rhythm: Regular rhythm. Tachycardia present. Pulses: Normal pulses. Heart sounds: Normal heart sounds. Pulmonary:      Effort: Pulmonary effort is normal. No respiratory distress. Breath sounds: Normal breath sounds. Abdominal:      General: Abdomen is flat. There is no distension. Palpations: Abdomen is soft. Tenderness: There is no abdominal tenderness. There is no guarding. Musculoskeletal:      Right lower leg: No edema. Left lower leg: No edema. Lymphadenopathy:      Cervical: No cervical adenopathy. Skin:     General: Skin is warm and dry. Neurological:      General: No focal deficit present. Mental Status: She is alert and oriented to person, place, and time. Cranial Nerves: No cranial nerve deficit. Sensory: No sensory deficit. Motor: No weakness. LABS     Labs Reviewed   CBC WITH AUTO DIFFERENTIAL - Abnormal; Notable for the following components:       Result Value    WBC 19.5 (*)     Segs Absolute 14.8 (*)     Immature Grans (Abs) 0.11 (*)     All other components within normal limits   RAPID INFLUENZA A/B ANTIGENS   BASIC METABOLIC PANEL   HCG, SERUM, QUALITATIVE       RADIOLOGY     No orders to display       DIFFERENTIALS:   1. Meningitis/Encephalitis  2. Tension HA  3. CVA    MDM: HA, tachy, and elevated WBC is suspicious for meningitis. Will get blood cultures and do LP. Will give something for pain. Will discharge pt. Pain is 0/10 now. Tachycardia resolved. Workup was negative. PLAN:   1. Dx: Tension Headache  2. Discharge home, follow up with PCP      Electronically signed by Michell Diamond on 1/30/2020 at 9:31 AM   **This is a Medical/ PA/ APRN Student Note and is charted for educational purposes. The non-physician staff attested note is not to be used for billing purposes or to guide patient care. Please see the physician modifications/ attestation for treatment plan/suggestions. This note has been reviewed and feedback has been provided to the student.  **

## 2020-01-30 NOTE — ED PROVIDER NOTES
1 tablet by mouth 2 times daily as needed for Pain, Disp: 60 tablet, Rfl: 0    nicotine (NICODERM CQ) 21 MG/24HR, Place 1 patch onto the skin daily, Disp: 30 patch, Rfl: 3    albuterol sulfate HFA (PROVENTIL HFA) 108 (90 Base) MCG/ACT inhaler, Inhale 2 puffs into the lungs every 4 hours as needed for Wheezing or Shortness of Breath (Space out to every 6 hours as symptoms improve) Space out to every 6 hours as symptoms improve., Disp: 1 Inhaler, Rfl: 0    naproxen (NAPROSYN) 500 MG tablet, Take 1 tablet by mouth 2 times daily, Disp: 60 tablet, Rfl: 0    busPIRone (BUSPAR) 5 MG tablet, Take 1 tablet by mouth 2 times daily, Disp: 30 tablet, Rfl: 0    venlafaxine (EFFEXOR XR) 37.5 MG extended release capsule, Take 1 capsule by mouth daily (with breakfast), Disp: 30 capsule, Rfl: 0    Multiple Vitamins-Minerals (WOMENS DAILY FORMULA PO), Take by mouth, Disp: , Rfl:       SOCIAL HISTORY     Social History     Patient does not qualify to have social determinant information on file (likely too young). Social History Narrative    Not on file     Social History     Tobacco Use    Smoking status: Current Every Day Smoker     Packs/day: 1.00     Types: Cigarettes    Smokeless tobacco: Never Used   Substance Use Topics    Alcohol use: Not Currently    Drug use: No         ALLERGIES   No Known Allergies      FAMILY HISTORY   History reviewed. No pertinent family history. PREVIOUS RECORDS   Previous records reviewed: Patient tested positive for influenza A 2 weeks ago. Tucker Lemons PHYSICAL EXAM     Vitals:    01/30/20 1448   BP: 134/70   Pulse: 86   Resp: 16   Temp:    SpO2: 95%     Vital signs and nursing assessment reviewed and abnormal from Tachycardia, systolic hypertension. Pulsoximetry is normal per my interpretation. Physical Exam  Vitals signs and nursing note reviewed. Constitutional:       General: She is not in acute distress. Appearance: Normal appearance. She is well-developed. She is obese. HENT:      Head: Normocephalic and atraumatic. Right Ear: External ear normal.      Left Ear: External ear normal.      Nose: Nose normal.      Mouth/Throat:      Mouth: Mucous membranes are moist.   Eyes:      Conjunctiva/sclera: Conjunctivae normal.      Pupils: Pupils are equal, round, and reactive to light. Neck:      Musculoskeletal: Neck supple. No neck rigidity. Vascular: No JVD. Cardiovascular:      Rate and Rhythm: Normal rate and regular rhythm. Heart sounds: Normal heart sounds. No murmur. No friction rub. No gallop. Pulmonary:      Effort: Pulmonary effort is normal.      Breath sounds: Normal breath sounds. No stridor. No wheezing or rales. Lymphadenopathy:      Cervical: No cervical adenopathy. Skin:     General: Skin is warm and dry. Neurological:      Mental Status: She is alert and oriented to person, place, and time. Psychiatric:         Behavior: Behavior normal.             MEDICAL DECISION MAKING   Initial Assessment: Headache, recent influenza A. Plan: IV line, labs, pain control, IV fluids, observation.         ED RESULTS     Labs Reviewed   CBC WITH AUTO DIFFERENTIAL - Abnormal; Notable for the following components:       Result Value    WBC 19.5 (*)     Segs Absolute 14.8 (*)     Immature Grans (Abs) 0.11 (*)     All other components within normal limits   BASIC METABOLIC PANEL - Abnormal; Notable for the following components:    CO2 20 (*)     Glucose 121 (*)     BUN 6 (*)     All other components within normal limits   OSMOLALITY - Abnormal; Notable for the following components:    Osmolality Calc 269.0 (*)     All other components within normal limits   MICROSCOPIC URINALYSIS - Abnormal; Notable for the following components:    Blood, Urine TRACE (*)     All other components within normal limits   RAPID INFLUENZA A/B ANTIGENS   HCG, SERUM, QUALITATIVE   ANION GAP   GLOMERULAR FILTRATION RATE, ESTIMATED         Medications   ketorolac (TORADOL) injection 15

## 2020-01-30 NOTE — ED NOTES
Patient resting quietly in cot with eyes closed. Shows no signs of distress at this time. Wakes to voice. Rates pain 4/10 and quickly falls back asleep. Will continue monitor.       Esther Menezes RN  01/30/20 3641

## 2020-01-30 NOTE — ED NOTES
Patient resting quietly in cot showing no signs of distress at this time. Rates pain 6/10. Medicated per MAR. Updated on POC. Urine specimen obtained and sent to lab. Will continue to monitor.       Jan Lake RN  01/30/20 5401

## 2020-01-30 NOTE — ED NOTES
Patient presents to ED via EMS from home for headache that began last night. States she tried to take multiple hot baths with no relief. States tylenol usually helps with her headaches but she did not have any at home to take. Patient did not take any medication for pain relief at home prior to arrival.  Patient was ambulatory from squad to ED room 16. Rates pain 8/10 which she states is worse with movement. Denies any sound or light sensitivity. Shows no signs of distress. Skin warm and dry. Respirations easy and unlabored.        David Garnica RN  01/30/20 2351

## 2020-01-30 NOTE — ED NOTES
Bed: 016A  Expected date: 1/30/20  Expected time: 8:48 AM  Means of arrival: [facility] Dept  Comments:     Jacey Beck RN  01/30/20 0428

## 2020-03-03 ENCOUNTER — HOSPITAL ENCOUNTER (EMERGENCY)
Age: 25
Discharge: HOME | End: 2020-03-03
Attending: FAMILY MEDICINE
Payer: MEDICAID

## 2020-03-03 VITALS
TEMPERATURE: 97.8 F | BODY MASS INDEX: 44.3 KG/M2 | WEIGHT: 250 LBS | OXYGEN SATURATION: 97 % | HEART RATE: 112 BPM | HEIGHT: 63 IN | RESPIRATION RATE: 18 BRPM

## 2020-03-03 LAB
ALBUMIN SERPL-MCNC: 4.1 G/DL (ref 3.5–5.1)
ALP BLD-CCNC: 112 U/L (ref 38–126)
ALT SERPL-CCNC: 21 U/L (ref 11–66)
AMPHETAMINE+METHAMPHETAMINE URINE SCREEN: NEGATIVE
ANION GAP SERPL CALCULATED.3IONS-SCNC: 12 MEQ/L (ref 8–16)
AST SERPL-CCNC: 18 U/L (ref 5–40)
BACTERIA: ABNORMAL
BARBITURATE QUANTITATIVE URINE: NEGATIVE
BASOPHILS # BLD: 0.3 %
BASOPHILS ABSOLUTE: 0.1 THOU/MM3 (ref 0–0.1)
BENZODIAZEPINE QUANTITATIVE URINE: NEGATIVE
BILIRUB SERPL-MCNC: < 0.2 MG/DL (ref 0.3–1.2)
BILIRUBIN URINE: NEGATIVE
BLOOD, URINE: ABNORMAL
BUN BLDV-MCNC: 9 MG/DL (ref 7–22)
CALCIUM SERPL-MCNC: 9.4 MG/DL (ref 8.5–10.5)
CANNABINOID QUANTITATIVE URINE: NEGATIVE
CASTS: ABNORMAL /LPF
CASTS: ABNORMAL /LPF
CHARACTER, URINE: CLEAR
CHLORIDE BLD-SCNC: 100 MEQ/L (ref 98–111)
CO2: 23 MEQ/L (ref 23–33)
COCAINE METABOLITE QUANTITATIVE URINE: NEGATIVE
COLOR: YELLOW
CREAT SERPL-MCNC: 0.6 MG/DL (ref 0.4–1.2)
CRYSTALS: ABNORMAL
EOSINOPHIL # BLD: 1.4 %
EOSINOPHILS ABSOLUTE: 0.2 THOU/MM3 (ref 0–0.4)
EPITHELIAL CELLS, UA: ABNORMAL /HPF
ERYTHROCYTE [DISTWIDTH] IN BLOOD BY AUTOMATED COUNT: 13.2 % (ref 11.5–14.5)
ERYTHROCYTE [DISTWIDTH] IN BLOOD BY AUTOMATED COUNT: 47.3 FL (ref 35–45)
ETHYL ALCOHOL, SERUM: < 0.01 %
GFR SERPL CREATININE-BSD FRML MDRD: > 90 ML/MIN/1.73M2
GLUCOSE BLD-MCNC: 95 MG/DL (ref 70–108)
GLUCOSE, URINE: NEGATIVE MG/DL
HCT VFR BLD CALC: 43.8 % (ref 37–47)
HEMOGLOBIN: 14.1 GM/DL (ref 12–16)
IMMATURE GRANS (ABS): 0.11 THOU/MM3 (ref 0–0.07)
IMMATURE GRANULOCYTES: 0.6 %
KETONES, URINE: ABNORMAL
LEUKOCYTE EST, POC: NEGATIVE
LYMPHOCYTES # BLD: 21.7 %
LYMPHOCYTES ABSOLUTE: 3.8 THOU/MM3 (ref 1–4.8)
MCH RBC QN AUTO: 31.5 PG (ref 26–33)
MCHC RBC AUTO-ENTMCNC: 32.2 GM/DL (ref 32.2–35.5)
MCV RBC AUTO: 97.8 FL (ref 81–99)
MISCELLANEOUS LAB TEST RESULT: ABNORMAL
MONOCYTES # BLD: 5.5 %
MONOCYTES ABSOLUTE: 1 THOU/MM3 (ref 0.4–1.3)
NITRITE, URINE: NEGATIVE
NUCLEATED RED BLOOD CELLS: 0 /100 WBC
OPIATES, URINE: NEGATIVE
OSMOLALITY CALCULATION: 268.6 MOSMOL/KG (ref 275–300)
OXYCODONE: NEGATIVE
PH UA: 5 (ref 5–9)
PHENCYCLIDINE QUANTITATIVE URINE: NEGATIVE
PLATELET # BLD: 343 THOU/MM3 (ref 130–400)
PMV BLD AUTO: 9.6 FL (ref 9.4–12.4)
POTASSIUM REFLEX MAGNESIUM: 4.4 MEQ/L (ref 3.5–5.2)
PREGNANCY, URINE: NEGATIVE
PROTEIN UA: NEGATIVE MG/DL
RBC # BLD: 4.48 MILL/MM3 (ref 4.2–5.4)
RBC URINE: ABNORMAL /HPF
RENAL EPITHELIAL, UA: ABNORMAL
SEG NEUTROPHILS: 70.5 %
SEGMENTED NEUTROPHILS ABSOLUTE COUNT: 12.4 THOU/MM3 (ref 1.8–7.7)
SODIUM BLD-SCNC: 135 MEQ/L (ref 135–145)
SPECIFIC GRAVITY UA: 1.02 (ref 1–1.03)
TOTAL PROTEIN: 8 G/DL (ref 6.1–8)
UROBILINOGEN, URINE: 0.2 EU/DL (ref 0–1)
WBC # BLD: 17.6 THOU/MM3 (ref 4.8–10.8)
WBC UA: ABNORMAL /HPF
YEAST: ABNORMAL

## 2020-03-03 PROCEDURE — 80307 DRUG TEST PRSMV CHEM ANLYZR: CPT

## 2020-03-03 PROCEDURE — 99283 EMERGENCY DEPT VISIT LOW MDM: CPT

## 2020-03-03 PROCEDURE — 6370000000 HC RX 637 (ALT 250 FOR IP): Performed by: FAMILY MEDICINE

## 2020-03-03 PROCEDURE — 81025 URINE PREGNANCY TEST: CPT

## 2020-03-03 PROCEDURE — 85025 COMPLETE CBC W/AUTO DIFF WBC: CPT

## 2020-03-03 PROCEDURE — 81001 URINALYSIS AUTO W/SCOPE: CPT

## 2020-03-03 PROCEDURE — 36415 COLL VENOUS BLD VENIPUNCTURE: CPT

## 2020-03-03 PROCEDURE — 80053 COMPREHEN METABOLIC PANEL: CPT

## 2020-03-03 PROCEDURE — G0480 DRUG TEST DEF 1-7 CLASSES: HCPCS

## 2020-03-03 RX ORDER — LORAZEPAM 0.5 MG/1
0.5 TABLET ORAL 2 TIMES DAILY
COMMUNITY
End: 2021-01-08

## 2020-03-03 RX ORDER — ONDANSETRON 4 MG/1
4 TABLET, ORALLY DISINTEGRATING ORAL EVERY 8 HOURS PRN
Qty: 10 TABLET | Refills: 0 | Status: SHIPPED | OUTPATIENT
Start: 2020-03-03 | End: 2021-01-20 | Stop reason: ALTCHOICE

## 2020-03-03 RX ORDER — ONDANSETRON 4 MG/1
4 TABLET, ORALLY DISINTEGRATING ORAL ONCE
Status: COMPLETED | OUTPATIENT
Start: 2020-03-03 | End: 2020-03-03

## 2020-03-03 RX ADMIN — ONDANSETRON 4 MG: 4 TABLET, ORALLY DISINTEGRATING ORAL at 20:26

## 2020-03-04 NOTE — ED PROVIDER NOTES
EMERGENCY DEPARTMENT ENCOUNTER     CHIEF COMPLAINT   Chief Complaint   Patient presents with    Nausea    Pruritis      HPI   Jagdish Avitia is a 30 y.o. female who presents with a complaint of having a worm in her body, that was introduced by a cut finger in [place] last year. She denies suicidal or homicidal thoughts. She denies fever, hallucinations. She was admitted to the ED for SIRS back in Jan 2010. She states that she is nauseous. REVIEW OF SYSTEMS   Psychiatric: Denies auditory hallucinations or homicidal Ideations   Respiratory:No difficulty breathing or new cough   General:No fevers   Neurologic:No known syncope   Psych: +pruritis  GI: +nausea, No vomiting or abdominal pain   See HPI for further details. All other review of systems otherwise negative. PAST MEDICAL & SURGICAL HISTORY   Past Medical History:   Diagnosis Date    Arthritis     Bipolar 1 disorder ([facility] [address]     Fibromyalgia       Past Surgical History:   Procedure Laterality Date    WISDOM TOOTH EXTRACTION        CURRENT MEDICATIONS   Current Outpatient Rx   Medication Sig Dispense Refill    LORazepam (ATIVAN) 0.5 MG tablet Take 0.5 mg by mouth 2 times daily.  ondansetron (ZOFRAN ODT) 4 MG disintegrating tablet Take 1 tablet by mouth every 8 hours as needed for Nausea or Vomiting 10 tablet 0    Pyrantel Pamoate 180 MG TABS Take 5 tablets by mouth once for 1 dose In one week, if you still have symptoms, take 5 more tablets in one setting.  10 tablet 0    naproxen (NAPROSYN) 500 MG tablet Take 1 tablet by mouth 2 times daily as needed for Pain 60 tablet 0    naproxen (NAPROSYN) 500 MG tablet Take 1 tablet by mouth 2 times daily 60 tablet 0    venlafaxine (EFFEXOR XR) 37.5 MG extended release capsule Take 1 capsule by mouth daily (with breakfast) 30 capsule 0    Multiple Vitamins-Minerals (WOMENS DAILY FORMULA PO) Take by mouth      nicotine (NICODERM CQ) 21 MG/24HR Place 1 patch onto the skin daily 30 patch 3    Eyes: PERRL, conjunctiva normal   HENT: Atraumatic, external ears normal, nose normal   Neck: supple, No JVD   Respiratory: No respiratory distress, normal breath sounds   Cardiovascular: Normal rate, normal rhythm, no murmurs   GI: Soft, nondistended, normal bowel sounds, nontender   Musculoskeletal: No edema, no deformities, well-healed linear scar on distal phalanx of right index finger without any wormy movement noted. Integument: Well hydrated   Neurologic: Awake alert and oriented, no slurred speech, normal gross motor coordination and strength. Normal Gait. CN 3-12 intatct. Psychiatric: Flat affect, does make good eye contact, mentions story of worms in her body       ED COURSE & MEDICAL DECISION MAKING   Pertinent Labs studies reviewed and interpreted. (See chart for details)   Consultation: Mental health Professional consulted in the emergency Department   Differential diagnoses: Metabolic emergency, infection, primary neurologic emergency, psychosis, behavioral psychiatric problem, toxidrome, illicit drug use, other   The patient is medically stable     FINAL IMPRESSION   1. Nausea    2. Infectious and parasitic disease         PLAN   MDM - pt certainly is exhibiting some bizarre behavior in stating she thinks she has a worm in her body after getting her right index finger cut by a knife while in [place] last year. She has since returned to [facility]. She also states she has nausea and itching all over, but she denies any pain, chest pain sob or drug use. She does not appear to be intoxicated. We will empirically treat pt's parasitic disease with pyrantel, Rx written. Also zofran for nausea relief will be given as Rx. PT was dc from ED in stable condition.      Electronically signed by: Junito Palma MD, 3/3/2020 9:35 PM     (This note was completed with a voice recognition program)         Edouard Urbano MD  03/03/20 3365

## 2020-03-04 NOTE — ED TRIAGE NOTES
Pt presents to the ED via EMS with c/o generalized itching and nausea. Pt reported to EMS that she recently moved from [place] and states that she has had a worm in her right index finger for two years now and thinks that the worm is causing her to itch. Pt states she does not know of any allergies. No rash or redness noted. Pt also complains of intermittent nausea. Pt denies pain at this time. VSS, respirations even and unlabored.

## 2020-09-24 ENCOUNTER — APPOINTMENT (OUTPATIENT)
Dept: GENERAL RADIOLOGY | Age: 25
End: 2020-09-24
Payer: MEDICAID

## 2020-09-24 ENCOUNTER — HOSPITAL ENCOUNTER (EMERGENCY)
Age: 25
Discharge: HOME | End: 2020-09-24
Payer: MEDICAID

## 2020-09-24 VITALS
WEIGHT: 250 LBS | BODY MASS INDEX: 44.3 KG/M2 | DIASTOLIC BLOOD PRESSURE: 99 MMHG | HEIGHT: 63 IN | OXYGEN SATURATION: 97 % | HEART RATE: 93 BPM | RESPIRATION RATE: 18 BRPM | TEMPERATURE: 97.9 F | SYSTOLIC BLOOD PRESSURE: 144 MMHG

## 2020-09-24 LAB
ALBUMIN SERPL-MCNC: 3.9 G/DL (ref 3.5–5.1)
ALP BLD-CCNC: 92 U/L (ref 38–126)
ALT SERPL-CCNC: 16 U/L (ref 11–66)
ANION GAP SERPL CALCULATED.3IONS-SCNC: 14 MEQ/L (ref 8–16)
AST SERPL-CCNC: 14 U/L (ref 5–40)
BASOPHILS # BLD: 0.2 %
BASOPHILS ABSOLUTE: 0 THOU/MM3 (ref 0–0.1)
BILIRUB SERPL-MCNC: 0.2 MG/DL (ref 0.3–1.2)
BUN BLDV-MCNC: 8 MG/DL (ref 7–22)
CALCIUM SERPL-MCNC: 9.8 MG/DL (ref 8.5–10.5)
CHLORIDE BLD-SCNC: 102 MEQ/L (ref 98–111)
CO2: 22 MEQ/L (ref 23–33)
CREAT SERPL-MCNC: 0.8 MG/DL (ref 0.4–1.2)
EKG ATRIAL RATE: 114 BPM
EKG P AXIS: 43 DEGREES
EKG P-R INTERVAL: 126 MS
EKG Q-T INTERVAL: 336 MS
EKG QRS DURATION: 78 MS
EKG QTC CALCULATION (BAZETT): 463 MS
EKG R AXIS: 17 DEGREES
EKG T AXIS: 27 DEGREES
EKG VENTRICULAR RATE: 114 BPM
EOSINOPHIL # BLD: 1 %
EOSINOPHILS ABSOLUTE: 0.2 THOU/MM3 (ref 0–0.4)
ERYTHROCYTE [DISTWIDTH] IN BLOOD BY AUTOMATED COUNT: 12.5 % (ref 11.5–14.5)
ERYTHROCYTE [DISTWIDTH] IN BLOOD BY AUTOMATED COUNT: 42.8 FL (ref 35–45)
GFR SERPL CREATININE-BSD FRML MDRD: 86 ML/MIN/1.73M2
GLUCOSE BLD-MCNC: 99 MG/DL (ref 70–108)
HCT VFR BLD CALC: 41.3 % (ref 37–47)
HEMOGLOBIN: 13.7 GM/DL (ref 12–16)
IMMATURE GRANS (ABS): 0.15 THOU/MM3 (ref 0–0.07)
IMMATURE GRANULOCYTES: 0.8 %
LIPASE: 34.8 U/L (ref 5.6–51.3)
LYMPHOCYTES # BLD: 19.4 %
LYMPHOCYTES ABSOLUTE: 3.6 THOU/MM3 (ref 1–4.8)
MCH RBC QN AUTO: 30.9 PG (ref 26–33)
MCHC RBC AUTO-ENTMCNC: 33.2 GM/DL (ref 32.2–35.5)
MCV RBC AUTO: 93 FL (ref 81–99)
MONOCYTES # BLD: 5.6 %
MONOCYTES ABSOLUTE: 1 THOU/MM3 (ref 0.4–1.3)
NUCLEATED RED BLOOD CELLS: 0 /100 WBC
OSMOLALITY CALCULATION: 274 MOSMOL/KG (ref 275–300)
PLATELET # BLD: 381 THOU/MM3 (ref 130–400)
PMV BLD AUTO: 9.3 FL (ref 9.4–12.4)
POTASSIUM REFLEX MAGNESIUM: 4.3 MEQ/L (ref 3.5–5.2)
PREGNANCY, SERUM: NEGATIVE
RBC # BLD: 4.44 MILL/MM3 (ref 4.2–5.4)
SEG NEUTROPHILS: 73 %
SEGMENTED NEUTROPHILS ABSOLUTE COUNT: 13.4 THOU/MM3 (ref 1.8–7.7)
SODIUM BLD-SCNC: 138 MEQ/L (ref 135–145)
TOTAL PROTEIN: 7.7 G/DL (ref 6.1–8)
TROPONIN T: < 0.01 NG/ML
WBC # BLD: 18.3 THOU/MM3 (ref 4.8–10.8)

## 2020-09-24 PROCEDURE — 6360000002 HC RX W HCPCS: Performed by: NURSE PRACTITIONER

## 2020-09-24 PROCEDURE — 93010 ELECTROCARDIOGRAM REPORT: CPT | Performed by: INTERNAL MEDICINE

## 2020-09-24 PROCEDURE — 93005 ELECTROCARDIOGRAM TRACING: CPT | Performed by: NURSE PRACTITIONER

## 2020-09-24 PROCEDURE — 80053 COMPREHEN METABOLIC PANEL: CPT

## 2020-09-24 PROCEDURE — 96372 THER/PROPH/DIAG INJ SC/IM: CPT

## 2020-09-24 PROCEDURE — 85025 COMPLETE CBC W/AUTO DIFF WBC: CPT

## 2020-09-24 PROCEDURE — 6370000000 HC RX 637 (ALT 250 FOR IP): Performed by: NURSE PRACTITIONER

## 2020-09-24 PROCEDURE — 2580000003 HC RX 258: Performed by: NURSE PRACTITIONER

## 2020-09-24 PROCEDURE — 83690 ASSAY OF LIPASE: CPT

## 2020-09-24 PROCEDURE — 99285 EMERGENCY DEPT VISIT HI MDM: CPT

## 2020-09-24 PROCEDURE — 36415 COLL VENOUS BLD VENIPUNCTURE: CPT

## 2020-09-24 PROCEDURE — 84484 ASSAY OF TROPONIN QUANT: CPT

## 2020-09-24 PROCEDURE — 99284 EMERGENCY DEPT VISIT MOD MDM: CPT

## 2020-09-24 PROCEDURE — 84703 CHORIONIC GONADOTROPIN ASSAY: CPT

## 2020-09-24 PROCEDURE — 71046 X-RAY EXAM CHEST 2 VIEWS: CPT

## 2020-09-24 RX ORDER — OMEPRAZOLE 20 MG/1
20 CAPSULE, DELAYED RELEASE ORAL
Qty: 30 CAPSULE | Refills: 0 | Status: SHIPPED | OUTPATIENT
Start: 2020-09-24 | End: 2021-01-20 | Stop reason: ALTCHOICE

## 2020-09-24 RX ORDER — 0.9 % SODIUM CHLORIDE 0.9 %
1000 INTRAVENOUS SOLUTION INTRAVENOUS ONCE
Status: COMPLETED | OUTPATIENT
Start: 2020-09-24 | End: 2020-09-24

## 2020-09-24 RX ORDER — HYDROXYZINE HYDROCHLORIDE 50 MG/ML
50 INJECTION, SOLUTION INTRAMUSCULAR ONCE
Status: COMPLETED | OUTPATIENT
Start: 2020-09-24 | End: 2020-09-24

## 2020-09-24 RX ADMIN — SODIUM CHLORIDE 1000 ML: 9 INJECTION, SOLUTION INTRAVENOUS at 12:35

## 2020-09-24 RX ADMIN — HYDROXYZINE HYDROCHLORIDE 50 MG: 50 INJECTION, SOLUTION INTRAMUSCULAR at 12:08

## 2020-09-24 RX ADMIN — LIDOCAINE HYDROCHLORIDE: 20 SOLUTION ORAL; TOPICAL at 12:07

## 2020-09-24 ASSESSMENT — ENCOUNTER SYMPTOMS
VOMITING: 0
SHORTNESS OF BREATH: 0
COLOR CHANGE: 0
TROUBLE SWALLOWING: 0
WHEEZING: 0
DIARRHEA: 0
SINUS PAIN: 0
CHEST TIGHTNESS: 1
NAUSEA: 0
ABDOMINAL PAIN: 0
COUGH: 1
BACK PAIN: 0
SORE THROAT: 0
CONSTIPATION: 0
RHINORRHEA: 0
SINUS PRESSURE: 0
PHOTOPHOBIA: 0

## 2020-09-24 ASSESSMENT — PAIN DESCRIPTION - PAIN TYPE: TYPE: ACUTE PAIN

## 2020-09-24 ASSESSMENT — PAIN DESCRIPTION - LOCATION: LOCATION: CHEST

## 2020-09-24 ASSESSMENT — PAIN DESCRIPTION - FREQUENCY: FREQUENCY: CONTINUOUS

## 2020-09-24 ASSESSMENT — PAIN SCALES - GENERAL: PAINLEVEL_OUTOF10: 7

## 2020-09-24 ASSESSMENT — PAIN DESCRIPTION - DESCRIPTORS: DESCRIPTORS: BURNING

## 2020-09-24 ASSESSMENT — PAIN DESCRIPTION - ORIENTATION: ORIENTATION: MID

## 2020-09-24 NOTE — ED NOTES
Pt to the ED with c/o mid chest burning 7/10. Feels \"burning\". Hx GERD not controlled. Pt reports this has been ongoing x several months. Also notes some SOB that has been ongoing for a few weeks. Reports a non-productive cough as well. States she has been smoking more and binge eating more than normal d/t anxiety. Pt lives in a group home d/t hx schizophrenia. Notes that she felt chills today which brought her to the ED.       Patricio Schwab RN  09/24/20 1851

## 2020-09-24 NOTE — ED PROVIDER NOTES
[facility] [address] COMPLAINT       Chief Complaint   Patient presents with    Gastroesophageal Reflux    Anxiety       Nurses Notes reviewed and I agree except as noted in the HPI. HISTORY OF PRESENT ILLNESS    Nora Martinez is a 30 y.o. female who presents to the Emergency Department for the evaluation of sternal chest pain and burning, cough, shortness of breath, anxiety. Patient has history of anxiety and schizophrenia. States that she woke up this morning with some increased chest tightness and burning. States that it feels similar when she was admitted for pneumonia in January of this year. She denies fever or sick contacts. She reports feeling increased anxiety over the past few days, no new stressors, no known source of increased anxiety. States that she takes lorazepam for her anxiety. Patient lives in a group home. History of drug abuse, denies taking any illicit drugs at this time. The HPI was provided by the patient. REVIEW OF SYSTEMS     Review of Systems   Constitutional: Negative for chills, diaphoresis, fatigue and fever. HENT: Negative for congestion, ear pain, nosebleeds, rhinorrhea, sinus pressure, sinus pain, sore throat and trouble swallowing. Eyes: Negative for photophobia. Respiratory: Positive for cough and chest tightness. Negative for shortness of breath and wheezing. Cardiovascular: Positive for chest pain. Negative for palpitations. Gastrointestinal: Negative for abdominal pain, constipation, diarrhea, nausea and vomiting. Endocrine: Negative for cold intolerance and heat intolerance. Genitourinary: Negative for difficulty urinating, dysuria, flank pain, hematuria, pelvic pain, vaginal bleeding, vaginal discharge and vaginal pain. Musculoskeletal: Negative for arthralgias, back pain, joint swelling, myalgias and neck stiffness. Skin: Negative for color change and wound. Neurological: Negative for dizziness, weakness, light-headedness, numbness and headaches. Psychiatric/Behavioral: Negative for agitation, behavioral problems, confusion, decreased concentration, hallucinations, self-injury and suicidal ideas. The patient is nervous/anxious. PAST MEDICAL HISTORY    has a past medical history of Arthritis, Bipolar 1 disorder ([address] and Fibromyalgia. SURGICAL HISTORY      has a past surgical history that includes [place] tooth extraction. CURRENT MEDICATIONS       Discharge Medication List as of 9/24/2020  1:22 PM      CONTINUE these medications which have NOT CHANGED    Details   lurasidone (LATUDA) 40 MG TABS tablet Take 40 mg by mouth 2 times dailyHistorical Med      metFORMIN (GLUCOPHAGE) 500 MG tablet Take 500 mg by mouth 2 times daily (with meals)Historical Med      LORazepam (ATIVAN) 0.5 MG tablet Take 0.5 mg by mouth 2 times daily. Historical Med      !! naproxen (NAPROSYN) 500 MG tablet Take 1 tablet by mouth 2 times daily, Disp-60 tablet, R-0Print      venlafaxine (EFFEXOR XR) 37.5 MG extended release capsule Take 1 capsule by mouth daily (with breakfast), Disp-30 capsule, R-0Normal      ondansetron (ZOFRAN ODT) 4 MG disintegrating tablet Take 1 tablet by mouth every 8 hours as needed for Nausea or Vomiting, Disp-10 tablet, R-0Print      !! naproxen (NAPROSYN) 500 MG tablet Take 1 tablet by mouth 2 times daily as needed for Pain, Disp-60 tablet, R-0Print      nicotine (NICODERM CQ) 21 MG/24HR Place 1 patch onto the skin daily, Disp-30 patch, R-3OTC      albuterol sulfate HFA (PROVENTIL HFA) 108 (90 Base) MCG/ACT inhaler Inhale 2 puffs into the lungs every 4 hours as needed for Wheezing or Shortness of Breath (Space out to every 6 hours as symptoms improve) Space out to every 6 hours as symptoms improve., Disp-1 Inhaler, R-0Print      busPIRone (BUSPAR) 5 MG tablet Take 1 tablet by mouth 2 times daily, Disp-30 tablet, R-0Normal      Multiple Vitamins-Minerals (WOMENS DAILY FORMULA PO) Take by mouthHistorical Med       !! - Potential duplicate medications found. Please discuss with provider. ALLERGIES     has No Known Allergies. FAMILY HISTORY     She indicated that her mother is alive. She indicated that her father is alive. family history is not on file. SOCIAL HISTORY      reports that she has been smoking cigarettes. She has been smoking about 2.00 packs per day. She has never used smokeless tobacco. She reports previous alcohol use. She reports that she does not use drugs. PHYSICAL EXAM     INITIAL VITALS:  height is 5' 3\" (1.6 m) and weight is 250 lb (113.4 kg). Her oral temperature is 97.9 °F (36.6 °C). Her blood pressure is 144/99 (abnormal) and her pulse is 93. Her respiration is 18 and oxygen saturation is 97%. Physical Exam  Vitals signs and nursing note reviewed. Constitutional:       General: She is awake. She is not in acute distress. Appearance: Normal appearance. She is well-developed. She is obese. She is not ill-appearing, toxic-appearing or diaphoretic. HENT:      Head: Normocephalic and atraumatic. Nose: Nose normal.      Mouth/Throat:      Mouth: Mucous membranes are moist.      Pharynx: Oropharynx is clear. Eyes:      Extraocular Movements: Extraocular movements intact. Pupils: Pupils are equal, round, and reactive to light. Neck:      Musculoskeletal: Normal range of motion and neck supple. No neck rigidity or muscular tenderness. Vascular: No carotid bruit. Cardiovascular:      Rate and Rhythm: Regular rhythm. Tachycardia present. Pulses: Normal pulses. Heart sounds: Normal heart sounds, S1 normal and S2 normal. Heart sounds not distant. No murmur. No friction rub. No gallop. Pulmonary:      Effort: Pulmonary effort is normal. No tachypnea, bradypnea, accessory muscle usage, prolonged expiration, respiratory distress or retractions. Breath sounds: Normal breath sounds. Abdominal:      General: Abdomen is flat. Bowel sounds are normal. There is no distension or abdominal bruit. There are no signs of injury. Palpations: Abdomen is soft. There is no shifting dullness, fluid wave, hepatomegaly, splenomegaly, mass or pulsatile mass. Tenderness: There is abdominal tenderness in the epigastric area. There is no guarding or rebound. Hernia: No hernia is present. Musculoskeletal: Normal range of motion. General: No swelling, tenderness, deformity or signs of injury. Right lower leg: No edema. Left lower leg: No edema. Lymphadenopathy:      Cervical: No cervical adenopathy. Skin:     General: Skin is warm and dry. Capillary Refill: Capillary refill takes less than 2 seconds. Neurological:      General: No focal deficit present. Mental Status: She is alert and oriented to person, place, and time. Mental status is at baseline. GCS: GCS eye subscore is 4. GCS verbal subscore is 5. GCS motor subscore is 6. Cranial Nerves: Cranial nerves are intact. Psychiatric:         Attention and Perception: Attention normal.         Mood and Affect: Mood is anxious. Speech: Speech normal.         Behavior: Behavior normal. Behavior is cooperative. Thought Content: Thought content normal.         Cognition and Memory: Cognition and memory normal.         Judgment: Judgment normal.          DIFFERENTIAL DIAGNOSIS:   Gastritis, GERD, ACS, generalized anxiety disorder, mood disorder    DIAGNOSTIC RESULTS     EKG: All EKG's are interpreted by the Emergency Department Physician who either signs or Co-signs this chart in the absence of a cardiologist.    Sinus tachycardia with rate of 114, , QRS of 78, QTc of 463.   Compared with EKG from January 30, 2020 with no significant changes    RADIOLOGY: non-plainfilm images(s) such as CT, Ultrasound and MRI are read by the radiologist.    XR CHEST (2 VW)   Final Result      No acute cardiopulmonary disease. **This report has been created using voice recognition software. It may contain minor errors which are inherent in voice recognition technology. **      Final report electronically signed by Dr. Jaime Dunbar on 9/24/2020 12:09 PM          LABS:     Labs Reviewed   CBC WITH AUTO DIFFERENTIAL - Abnormal; Notable for the following components:       Result Value    WBC 18.3 (*)     MPV 9.3 (*)     Segs Absolute 13.4 (*)     Immature Grans (Abs) 0.15 (*)     All other components within normal limits   COMPREHENSIVE METABOLIC PANEL W/ REFLEX TO MG FOR LOW K - Abnormal; Notable for the following components:    CO2 22 (*)     Total Bilirubin 0.2 (*)     All other components within normal limits   OSMOLALITY - Abnormal; Notable for the following components:    Osmolality Calc 274.0 (*)     All other components within normal limits   GLOMERULAR FILTRATION RATE, ESTIMATED - Abnormal; Notable for the following components:    Est, Glom Filt Rate 86 (*)     All other components within normal limits   TROPONIN   LIPASE   HCG, SERUM, QUALITATIVE   ANION GAP       EMERGENCY DEPARTMENT COURSE:   Vitals:    Vitals:    09/24/20 1123 09/24/20 1238   BP: (!) 145/68 (!) 144/99   Pulse: 118 93   Resp: 18 18   Temp: 97.9 °F (36.6 °C)    TempSrc: Oral    SpO2: 95% 97%   Weight: 250 lb (113.4 kg)    Height: 5' 3\" (1.6 m)        11:18 AM EDT: The patient was seen and evaluated. 1248-she reports that symptoms have nearly completely resolved. MDM:  Patient seen and evaluated for chest tightness, burning, reflux as well as anxiety. On my initial exam, she was noted to be mildly tachycardic with rate of 117, no acute distress noted. Patient has recent history of admission for pneumonia. Appropriate labs and imaging were ordered. Patient had noted to have elevated WBCs of 18.3 with no identifiable source of infection, review of history shows that her WBCs are chronically elevated.   She was hydrated with IV fluids, treated with GI cocktail. Troponin negative, EKG showing sinus tachycardia, chest x-ray clear. I believe this is likely accommodation of anxiety as well as gastroesophageal reflux. She will be placed on omeprazole for management, instructed to follow-up with PCP for reevaluation of symptoms. She was amenable to plan for discharge, voiced no further concerns, observed remained stable throughout ED stay and discharged in stable condition    CRITICAL CARE:   None    CONSULTS:  None    PROCEDURES:  None    FINAL IMPRESSION      1. Anxiety state    2. Heartburn          DISPOSITION/PLAN   Discharge    PATIENT REFERRED TO:  NAMAN Daniels CNP  [address] [facility] [address]  [phone]    Call   As needed      DISCHARGE MEDICATIONS:  Discharge Medication List as of 9/24/2020  1:22 PM      START taking these medications    Details   omeprazole (PRILOSEC) 20 MG delayed release capsule Take 1 capsule by mouth every morning (before breakfast), Disp-30 capsule,R-0Print             (Please note that portions of this note were completed with a voice recognition program.  Efforts were made to edit the dictations but occasionally words are mis-transcribed.)    The patient was given an opportunity to see the Emergency Attending. The patient voiced understanding that I was a Mid-LevelProvider and was in agreement with being seen independently by myself.        NAMAN Plata CNP, 9/24/20, 6:21 AM       NAMAN Plata CNP  09/29/20 7559

## 2020-09-24 NOTE — ED NOTES
Bed: 029A  Expected date:   Expected time:   [facility]  Comments:      Rebecca Drake RN  09/24/20 [id number]

## 2020-09-24 NOTE — ED NOTES
Pt ambulated to the restroom in stable condition at this time. IV initiated with fluids infusing. Pt reports no decrease in her chest discomfort. Will continue to monitor.       Mireya Betts RN  09/24/20 9980

## 2020-10-24 ENCOUNTER — HOSPITAL ENCOUNTER (EMERGENCY)
Age: 25
Discharge: HOME | End: 2020-10-25
Payer: MEDICAID

## 2020-10-24 LAB
EKG ATRIAL RATE: 132 BPM
EKG P AXIS: 65 DEGREES
EKG P-R INTERVAL: 122 MS
EKG Q-T INTERVAL: 310 MS
EKG QRS DURATION: 78 MS
EKG QTC CALCULATION (BAZETT): 459 MS
EKG R AXIS: 50 DEGREES
EKG T AXIS: 49 DEGREES
EKG VENTRICULAR RATE: 132 BPM

## 2020-10-24 PROCEDURE — 85025 COMPLETE CBC W/AUTO DIFF WBC: CPT

## 2020-10-24 PROCEDURE — 99285 EMERGENCY DEPT VISIT HI MDM: CPT

## 2020-10-24 PROCEDURE — 80048 BASIC METABOLIC PNL TOTAL CA: CPT

## 2020-10-24 PROCEDURE — 36415 COLL VENOUS BLD VENIPUNCTURE: CPT

## 2020-10-24 PROCEDURE — 93005 ELECTROCARDIOGRAM TRACING: CPT | Performed by: PHYSICIAN ASSISTANT

## 2020-10-24 ASSESSMENT — PAIN DESCRIPTION - LOCATION: LOCATION: CHEST

## 2020-10-24 ASSESSMENT — PAIN DESCRIPTION - PAIN TYPE: TYPE: ACUTE PAIN

## 2020-10-24 ASSESSMENT — PAIN SCALES - GENERAL: PAINLEVEL_OUTOF10: 8

## 2020-10-25 ENCOUNTER — APPOINTMENT (OUTPATIENT)
Dept: GENERAL RADIOLOGY | Age: 25
End: 2020-10-25
Payer: MEDICAID

## 2020-10-25 VITALS
BODY MASS INDEX: 44.3 KG/M2 | HEART RATE: 104 BPM | RESPIRATION RATE: 22 BRPM | HEIGHT: 63 IN | DIASTOLIC BLOOD PRESSURE: 60 MMHG | WEIGHT: 250 LBS | TEMPERATURE: 98 F | SYSTOLIC BLOOD PRESSURE: 136 MMHG | OXYGEN SATURATION: 93 %

## 2020-10-25 LAB
ANION GAP SERPL CALCULATED.3IONS-SCNC: 13 MEQ/L (ref 8–16)
BASOPHILS # BLD: 0.2 %
BASOPHILS ABSOLUTE: 0 THOU/MM3 (ref 0–0.1)
BUN BLDV-MCNC: 8 MG/DL (ref 7–22)
CALCIUM SERPL-MCNC: 9.5 MG/DL (ref 8.5–10.5)
CHLORIDE BLD-SCNC: 102 MEQ/L (ref 98–111)
CO2: 22 MEQ/L (ref 23–33)
CREAT SERPL-MCNC: 0.7 MG/DL (ref 0.4–1.2)
D-DIMER QUANTITATIVE: 220 NG/ML FEU (ref 0–500)
EOSINOPHIL # BLD: 3.2 %
EOSINOPHILS ABSOLUTE: 0.5 THOU/MM3 (ref 0–0.4)
ERYTHROCYTE [DISTWIDTH] IN BLOOD BY AUTOMATED COUNT: 12.9 % (ref 11.5–14.5)
ERYTHROCYTE [DISTWIDTH] IN BLOOD BY AUTOMATED COUNT: 44.5 FL (ref 35–45)
GFR SERPL CREATININE-BSD FRML MDRD: > 90 ML/MIN/1.73M2
GLUCOSE BLD-MCNC: 111 MG/DL (ref 70–108)
HCT VFR BLD CALC: 39.5 % (ref 37–47)
HEMOGLOBIN: 12.9 GM/DL (ref 12–16)
IMMATURE GRANS (ABS): 0.09 THOU/MM3 (ref 0–0.07)
IMMATURE GRANULOCYTES: 0.6 %
LYMPHOCYTES # BLD: 16.9 %
LYMPHOCYTES ABSOLUTE: 2.7 THOU/MM3 (ref 1–4.8)
MCH RBC QN AUTO: 30.9 PG (ref 26–33)
MCHC RBC AUTO-ENTMCNC: 32.7 GM/DL (ref 32.2–35.5)
MCV RBC AUTO: 94.5 FL (ref 81–99)
MONOCYTES # BLD: 6.4 %
MONOCYTES ABSOLUTE: 1 THOU/MM3 (ref 0.4–1.3)
NUCLEATED RED BLOOD CELLS: 0 /100 WBC
OSMOLALITY CALCULATION: 272.8 MOSMOL/KG (ref 275–300)
PLATELET # BLD: 352 THOU/MM3 (ref 130–400)
PMV BLD AUTO: 9.7 FL (ref 9.4–12.4)
POTASSIUM SERPL-SCNC: 3.9 MEQ/L (ref 3.5–5.2)
RBC # BLD: 4.18 MILL/MM3 (ref 4.2–5.4)
SARS-COV-2, NAAT: NOT DETECTED
SEG NEUTROPHILS: 72.7 %
SEGMENTED NEUTROPHILS ABSOLUTE COUNT: 11.8 THOU/MM3 (ref 1.8–7.7)
SODIUM BLD-SCNC: 137 MEQ/L (ref 135–145)
WBC # BLD: 16.2 THOU/MM3 (ref 4.8–10.8)

## 2020-10-25 PROCEDURE — U0002 COVID-19 LAB TEST NON-CDC: HCPCS

## 2020-10-25 PROCEDURE — 94640 AIRWAY INHALATION TREATMENT: CPT

## 2020-10-25 PROCEDURE — 93010 ELECTROCARDIOGRAM REPORT: CPT | Performed by: NUCLEAR MEDICINE

## 2020-10-25 PROCEDURE — 71045 X-RAY EXAM CHEST 1 VIEW: CPT

## 2020-10-25 PROCEDURE — 36415 COLL VENOUS BLD VENIPUNCTURE: CPT

## 2020-10-25 PROCEDURE — 6360000002 HC RX W HCPCS: Performed by: PHYSICIAN ASSISTANT

## 2020-10-25 PROCEDURE — 6370000000 HC RX 637 (ALT 250 FOR IP): Performed by: PHYSICIAN ASSISTANT

## 2020-10-25 PROCEDURE — 85379 FIBRIN DEGRADATION QUANT: CPT

## 2020-10-25 RX ORDER — ALBUTEROL SULFATE 90 UG/1
2 AEROSOL, METERED RESPIRATORY (INHALATION) ONCE
Status: COMPLETED | OUTPATIENT
Start: 2020-10-25 | End: 2020-10-25

## 2020-10-25 RX ORDER — IPRATROPIUM BROMIDE AND ALBUTEROL SULFATE 2.5; .5 MG/3ML; MG/3ML
1 SOLUTION RESPIRATORY (INHALATION) ONCE
Status: COMPLETED | OUTPATIENT
Start: 2020-10-25 | End: 2020-10-25

## 2020-10-25 RX ORDER — PREDNISONE 50 MG/1
50 TABLET ORAL DAILY
Qty: 4 TABLET | Refills: 0 | Status: SHIPPED | OUTPATIENT
Start: 2020-10-25 | End: 2020-10-29

## 2020-10-25 RX ADMIN — ALBUTEROL SULFATE 2.5 MG: 2.5 SOLUTION RESPIRATORY (INHALATION) at 01:08

## 2020-10-25 RX ADMIN — ALBUTEROL SULFATE 2 PUFF: 90 AEROSOL, METERED RESPIRATORY (INHALATION) at 02:09

## 2020-10-25 RX ADMIN — IPRATROPIUM BROMIDE AND ALBUTEROL SULFATE 1 AMPULE: .5; 3 SOLUTION RESPIRATORY (INHALATION) at 01:08

## 2020-10-25 ASSESSMENT — ENCOUNTER SYMPTOMS
COUGH: 1
DIARRHEA: 0
EYE DISCHARGE: 0
NAUSEA: 0
SHORTNESS OF BREATH: 1
CHEST TIGHTNESS: 1
SORE THROAT: 0
RHINORRHEA: 0
ABDOMINAL PAIN: 0
VOMITING: 0

## 2020-10-25 ASSESSMENT — PAIN SCALES - GENERAL: PAINLEVEL_OUTOF10: 8

## 2020-10-25 ASSESSMENT — PAIN DESCRIPTION - LOCATION: LOCATION: CHEST

## 2020-10-25 ASSESSMENT — PAIN DESCRIPTION - PAIN TYPE: TYPE: ACUTE PAIN

## 2020-10-25 NOTE — ED NOTES
Bed: 019A  Expected date: 10/24/20  Expected time: 11:38 PM  Means of arrival: [facility]  Comments:     Shilpa Boudreaux RN  10/24/20 1048

## 2020-10-25 NOTE — ED PROVIDER NOTES
[facility] [address] [address] EMERGENCY DEPT      CHIEF COMPLAINT       Chief Complaint   Patient presents with    Shortness of Breath    Cough       Nurses Notes reviewed and I agree except as noted in the HPI. HISTORY OF PRESENT ILLNESS    Melvin Stark is a 30 y.o. female who presents for shortness of breath. Yesterday patient woke up with chest heaviness/tightness and dyspnea. There is also cough and wheezing. She has been walking to the [facility] daily to lose weight for the past month. She denies known sick contacts. She denies history of asthma. She denies any other URI symptoms, fever, chills, nausea, vomiting, or other complaints except as mentioned above in a 10 point review of systems. The patient is a smoker. The patient denies chance of pregnancy and is on birth control pills. Patient denies drug use. The patient denies any recent long distance travel, admission to the hospital, surgery, or prior history of PE or DVT. The patient was given Solu-Medrol 125 mg IV and albuterol nebulizer treatment in route via EMS. REVIEW OF SYSTEMS     Review of Systems   Constitutional: Negative for appetite change, chills, fatigue and fever. HENT: Negative for congestion, ear pain, rhinorrhea and sore throat. Eyes: Negative for discharge. Respiratory: Positive for cough, chest tightness and shortness of breath. Cardiovascular: Negative for chest pain and leg swelling. Gastrointestinal: Negative for abdominal pain, diarrhea, nausea and vomiting. Endocrine: Negative for polyuria. Genitourinary: Negative for decreased urine volume, dysuria and frequency. Musculoskeletal: Negative for gait problem and myalgias. Skin: Negative for rash. Neurological: Negative for dizziness, weakness and light-headedness. Hematological: Negative for adenopathy. Psychiatric/Behavioral: Negative for confusion and sleep disturbance.         PAST MEDICAL HISTORY    has a past medical history of Arthritis, Bipolar 1 disorder ([facility] [address] and Fibromyalgia. SURGICAL HISTORY      has a past surgical history that includes Crum tooth extraction. CURRENT MEDICATIONS       Discharge Medication List as of 10/25/2020  1:59 AM      CONTINUE these medications which have NOT CHANGED    Details   lurasidone (LATUDA) 40 MG TABS tablet Take 40 mg by mouth 2 times dailyHistorical Med      metFORMIN (GLUCOPHAGE) 500 MG tablet Take 500 mg by mouth 2 times daily (with meals)Historical Med      omeprazole (PRILOSEC) 20 MG delayed release capsule Take 1 capsule by mouth every morning (before breakfast), Disp-30 capsule,R-0Print      LORazepam (ATIVAN) 0.5 MG tablet Take 0.5 mg by mouth 2 times daily. Historical Med      ondansetron (ZOFRAN ODT) 4 MG disintegrating tablet Take 1 tablet by mouth every 8 hours as needed for Nausea or Vomiting, Disp-10 tablet, R-0Print      !! naproxen (NAPROSYN) 500 MG tablet Take 1 tablet by mouth 2 times daily as needed for Pain, Disp-60 tablet, R-0Print      nicotine (NICODERM CQ) 21 MG/24HR Place 1 patch onto the skin daily, Disp-30 patch, R-3OTC      albuterol sulfate HFA (PROVENTIL HFA) 108 (90 Base) MCG/ACT inhaler Inhale 2 puffs into the lungs every 4 hours as needed for Wheezing or Shortness of Breath (Space out to every 6 hours as symptoms improve) Space out to every 6 hours as symptoms improve., Disp-1 Inhaler, R-0Print      !! naproxen (NAPROSYN) 500 MG tablet Take 1 tablet by mouth 2 times daily, Disp-60 tablet, R-0Print      busPIRone (BUSPAR) 5 MG tablet Take 1 tablet by mouth 2 times daily, Disp-30 tablet, R-0Normal      venlafaxine (EFFEXOR XR) 37.5 MG extended release capsule Take 1 capsule by mouth daily (with breakfast), Disp-30 capsule, R-0Normal      Multiple Vitamins-Minerals (WOMENS DAILY FORMULA PO) Take by mouthHistorical Med       !! - Potential duplicate medications found. Please discuss with provider. ALLERGIES     has No Known Allergies.     FAMILY HISTORY     She indicated that her mother is alive. She indicated that her father is alive. family history is not on file. SOCIAL HISTORY    reports that she has been smoking cigarettes. She has been smoking about 2.00 packs per day. She has never used smokeless tobacco. She reports previous alcohol use. She reports that she does not use drugs. PHYSICAL EXAM     INITIAL VITALS:  height is 5' 3\" (1.6 m) and weight is 250 lb (113.4 kg). Her oral temperature is 98 °F (36.7 °C). Her blood pressure is 136/60 and her pulse is 104. Her respiration is 22 and oxygen saturation is 93%. Physical Exam  Constitutional:       Appearance: Normal appearance. She is well-developed. She is not ill-appearing or diaphoretic. HENT:      Head: Normocephalic and atraumatic. Right Ear: Hearing normal.      Left Ear: Hearing normal.      Nose: Nose normal. No rhinorrhea. Mouth/Throat:      Lips: Pink. Mouth: Mucous membranes are moist.      Pharynx: Oropharynx is clear. Eyes:      General: Lids are normal. No scleral icterus. Extraocular Movements: Extraocular movements intact. Conjunctiva/sclera: Conjunctivae normal.      Pupils: Pupils are equal, round, and reactive to light. Neck:      Musculoskeletal: Normal range of motion and neck supple. No neck rigidity. Trachea: Trachea normal.   Cardiovascular:      Rate and Rhythm: Normal rate and regular rhythm. Heart sounds: Normal heart sounds. No murmur. Pulmonary:      Effort: Pulmonary effort is normal.      Breath sounds: Normal air entry. Decreased breath sounds and wheezing present. No rhonchi. Abdominal:      General: There is no distension. Palpations: Abdomen is soft. Tenderness: There is no abdominal tenderness. Musculoskeletal:      Comments: Well perfused; movement normal as observed; no signs of DVT   Lymphadenopathy:      Cervical: No cervical adenopathy. Skin:     General: Skin is warm and dry. Findings: No rash.    Neurological: General: No focal deficit present. Mental Status: She is alert. Sensory: Sensation is intact. Motor: Motor function is intact. Gait: Gait is intact. Psychiatric:         Mood and Affect: Mood normal.         Speech: Speech normal.         Behavior: Behavior is cooperative. DIFFERENTIAL DIAGNOSIS:   Including but not limited to: Bronchitis, pneumonia, Covid, viral URI    DIAGNOSTIC RESULTS     EKG: All [facility]'s [facility] who either signs or Co-signs this chart in the absence of a cardiologist.  Ventricular rate 132 bpm  TX interval 122 ms  QRS duration 78 ms  QTc interval 459 ms  P discharge ST axes 65, 50, and 49  Sinus tachycardia with no STEMI. RADIOLOGY: non-plain film images(s) such as CT,Ultrasound and MRI are read by the radiologist.  Plain radiographic images are visualized and preliminarily interpreted by the emergency physician unless otherwise stated below. XR CHEST PORTABLE   Final Result   No acute findings.       This document has been electronically signed by: Giacomo Pickard MD on    10/25/2020 01:13 AM             LABS:   Labs Reviewed   CBC WITH AUTO DIFFERENTIAL - Abnormal; Notable for the following components:       Result Value    WBC 16.2 (*)     RBC 4.18 (*)     Segs Absolute 11.8 (*)     Eosinophils Absolute 0.5 (*)     Immature Grans (Abs) 0.09 (*)     All other components within normal limits   BASIC METABOLIC PANEL - Abnormal; Notable for the following components:    CO2 22 (*)     Glucose 111 (*)     All other components within normal limits   OSMOLALITY - Abnormal; Notable for the following components:    Osmolality Calc 272.8 (*)     All other components within normal limits   D-DIMER, QUANTITATIVE   COVID-19   ANION GAP   GLOMERULAR FILTRATION RATE, ESTIMATED       EMERGENCY DEPARTMENT COURSE:   Vitals:    Vitals:    10/25/20 0054 10/25/20 0108 10/25/20 0146 10/25/20 0155   BP: (!) 148/86  136/60    Pulse: 119  104 Resp: 23 21 27 22   Temp:       TempSrc:       SpO2: 94% 95% 90% 93%   Weight:       Height:           MDM:  The patient was seen and evaluated within the ED today for shortness of breath. On exam, I appreciated diminished air movement with expiratory wheezes throughout. Patient was nontoxic-appearing and not in respiratory distress. Old records were reviewed. Within the department, I observed the patient's vital signs to be within acceptable range, she was tachycardic but received a breathing treatment prior to arrival. Radiological studies within the department revealed no acute intracardiopulmonary process. Laboratory work was reassuring, including a negative D-dimer. Within the department the patient was treated with DuoNeb and albuterol nebulizer treatments. I observed the patient's condition to modestly improve during the duration of their stay. She stated she felt much improved. On re-exam air movement was improved. Wheezing was decreased and only some sparse coarse rales remained in the bases. Vital signs remained stable. The patient remained non-toxic appearing with no distress evident in the ER. The patient was comfortable with the plan of discharge home and to follow up with her PCP. Anticipatory guidance was given. Albuterol with spacer was dispensed to her from the ER with instructions. The patient was instructed to return to the emergency department for any worsening of their symptoms. Patient was discharged from the emergency department in good condition with all questions answered. See disposition below. I have given the patient strict written and verbal instructions about care at home, follow-up, and signs and symptoms of worsening of condition and they did verbalize understanding. CRITICAL CARE:   None    CONSULTS:  None    PROCEDURES:  None    FINAL IMPRESSION      1. Bronchitis    2. Tobacco dependence          DISPOSITION/PLAN     1. Bronchitis    2.  Tobacco dependence

## 2020-11-02 ENCOUNTER — OFFICE VISIT (OUTPATIENT)
Dept: FAMILY MEDICINE CLINIC | Age: 25
End: 2020-11-02
Payer: MEDICAID

## 2020-11-02 VITALS
BODY MASS INDEX: 44.3 KG/M2 | HEIGHT: 63 IN | RESPIRATION RATE: 16 BRPM | WEIGHT: 250 LBS | SYSTOLIC BLOOD PRESSURE: 124 MMHG | OXYGEN SATURATION: 98 % | TEMPERATURE: 97 F | DIASTOLIC BLOOD PRESSURE: 78 MMHG | HEART RATE: 90 BPM

## 2020-11-02 PROCEDURE — 4004F PT TOBACCO SCREEN RCVD TLK: CPT | Performed by: FAMILY MEDICINE

## 2020-11-02 PROCEDURE — 90686 IIV4 VACC NO PRSV 0.5 ML IM: CPT | Performed by: INTERNAL MEDICINE

## 2020-11-02 PROCEDURE — 99213 OFFICE O/P EST LOW 20 MIN: CPT | Performed by: FAMILY MEDICINE

## 2020-11-02 PROCEDURE — G8417 CALC BMI ABV UP PARAM F/U: HCPCS | Performed by: FAMILY MEDICINE

## 2020-11-02 PROCEDURE — 90471 IMMUNIZATION ADMIN: CPT | Performed by: INTERNAL MEDICINE

## 2020-11-02 PROCEDURE — G8427 DOCREV CUR MEDS BY ELIG CLIN: HCPCS | Performed by: FAMILY MEDICINE

## 2020-11-02 PROCEDURE — G8482 FLU IMMUNIZE ORDER/ADMIN: HCPCS | Performed by: FAMILY MEDICINE

## 2020-11-02 RX ORDER — ALBUTEROL SULFATE 90 UG/1
2 AEROSOL, METERED RESPIRATORY (INHALATION) EVERY 4 HOURS PRN
Qty: 1 INHALER | Refills: 0 | Status: SHIPPED | OUTPATIENT
Start: 2020-11-02 | End: 2021-09-10

## 2020-11-02 RX ORDER — VENLAFAXINE HYDROCHLORIDE 75 MG/1
75 CAPSULE, EXTENDED RELEASE ORAL DAILY
COMMUNITY
Start: 2020-10-19 | End: 2021-01-20 | Stop reason: ALTCHOICE

## 2020-11-02 RX ORDER — TRIHEXYPHENIDYL HYDROCHLORIDE 5 MG/1
5 TABLET ORAL 2 TIMES DAILY
COMMUNITY
Start: 2020-10-19 | End: 2021-09-10

## 2020-11-02 RX ORDER — ETHINYL ESTRADIOL/DROSPIRENONE 0.02-3(28)
1 TABLET ORAL DAILY
COMMUNITY
Start: 2020-10-12 | End: 2021-01-20 | Stop reason: ALTCHOICE

## 2020-11-02 SDOH — ECONOMIC STABILITY: FOOD INSECURITY: WITHIN THE PAST 12 MONTHS, THE FOOD YOU BOUGHT JUST DIDN'T LAST AND YOU DIDN'T HAVE MONEY TO GET MORE.: NEVER TRUE

## 2020-11-02 SDOH — ECONOMIC STABILITY: INCOME INSECURITY: HOW HARD IS IT FOR YOU TO PAY FOR THE VERY BASICS LIKE FOOD, HOUSING, MEDICAL CARE, AND HEATING?: NOT HARD AT ALL

## 2020-11-02 SDOH — ECONOMIC STABILITY: FOOD INSECURITY: WITHIN THE PAST 12 MONTHS, YOU WORRIED THAT YOUR FOOD WOULD RUN OUT BEFORE YOU GOT MONEY TO BUY MORE.: NEVER TRUE

## 2020-11-02 SDOH — ECONOMIC STABILITY: TRANSPORTATION INSECURITY
IN THE PAST 12 MONTHS, HAS LACK OF TRANSPORTATION KEPT YOU FROM MEETINGS, WORK, OR FROM GETTING THINGS NEEDED FOR DAILY LIVING?: NO

## 2020-11-02 SDOH — ECONOMIC STABILITY: TRANSPORTATION INSECURITY
IN THE PAST 12 MONTHS, HAS THE LACK OF TRANSPORTATION KEPT YOU FROM MEDICAL APPOINTMENTS OR FROM GETTING MEDICATIONS?: NO

## 2020-11-02 ASSESSMENT — ENCOUNTER SYMPTOMS
TROUBLE SWALLOWING: 0
SHORTNESS OF BREATH: 0
COUGH: 1
EYE PAIN: 0
CONSTIPATION: 0
BLOOD IN STOOL: 0
ABDOMINAL PAIN: 0
DIARRHEA: 0

## 2020-11-02 NOTE — PROGRESS NOTES
Immunizations Administered     Name Date Dose Route    Influenza, Quadv, IM, PF (6 mo and older Fluzone, Flulaval, Fluarix, and 3 yrs and older Afluria) 11/2/2020 0.5 mL Intramuscular    Site: Deltoid- Left    Lot: [id number]    NDC: 46351-111-02        CLINICAL PHARMACY CONSULT: MED RECONCILIATION/REVIEW ADDENDUM    For Pharmacy Admin Tracking Only    PHSO: No  Total # of Interventions Recommended: 1  Total Interventions Accepted: 1  Time Spent (min): 5    Jaylin Baez, PharmD

## 2020-11-02 NOTE — PATIENT INSTRUCTIONS
Thank you   1. Thank you for trusting us with your healthcare needs. You may receive a survey regarding today's visit. It would help us out if you would take a few moments to provide your feedback. We value your input. 2. Please bring in ALL medications BOTTLES, including inhalers, herbal supplements, over the counter, prescribed & non-prescribed medicine. The office would like actual medication bottles and a list.   3. Please note our OFFICE POLICIES:   a. Prior to getting your labs drawn, please check with your insurance company for benefits and eligibility of lab services. Often, insurance companies cover certain tests for preventative visits only. It is patient's responsibility to see what is covered. b. We are unable to change a diagnosis after the test has been performed. c. Lab orders will not be re-printed. Please hold onto your original lab orders and take them to your lab to be completed. d. If you no show your scheduled appointment three times, you will be dismissed from this practice. e. Jesus Males must be completed 24 hours prior to your schedule appointment. 4. If the list below has been completed, PLEASE FAX RECORDS TO OUR OFFICE @ [phone].  Once the records have been received we will update your records at our office:  Health Maintenance Due   Topic Date Due    Varicella vaccine (1 of 2 - 2-dose childhood series) 04/02/1994    Pneumococcal 0-64 years Vaccine (1 of 1 - PPSV23) 04/02/1999    HIV screen  04/02/2008    DTaP/Tdap/Td vaccine (1 - Tdap) 04/02/2012    Cervical cancer screen  04/02/2014    Flu vaccine (1) 09/01/2020           Tobacco Cessation Programs     Telephonic behavior modification   9-800-QUIT-NOW (552-4280)   Counseling service for those who are ready to quit using tobacco.     Available for uninsured [place] residents, [facility] recipients, pregnant women, or patients whose health plans or employers are members of the [address] [place] [address] behavior modification   http://[place]. Quitlogix. org   Online support program to help patients through each step of the quitting process. Available 24 hours a day 7 days a week. Provides up to date researched based tool, step-by-step guides, and motivational messages. Online behavior modification   www.lungusa.org/stop-smoking/how-to-quit   HelpLine: [facility] (254-7929)   Email questions to:  Ricki@[facility]. [employer]    Website offers resources to help tobacco users figure out their reasons for quitting and then take the big step of quitting for good. Hypnosis   Location: [address], [facility], [place]   Contact: Kassie Manuel, PhD at [phone]   Hypnosis for tobacco cessation   Cost $225 for the initial session and $175 for each session afterwards. Most patients require 6-8 sessions. There is the option to submit through the patients insurance. Hypnosis and behavior modification   Location: [place] [address],  [address] [facility], [place]   Contact: Jasmin Sheth, PhD at [phone]  [facility] and hypnosis for nicotine addition   Cost: For uninsured patients:  Please call above phone number  Cost for insured patients depends on patients insurance plan. Behavior modification   Location: [facility], [address] [place] [address]., [facility], [place]   Contact: Melissa Schwarz include four one-on-one appointments between the patient and a respiratory therapist.  The four appointments span over three weeks. The respiratory therapist schedules one of the appointments to occur 48 hours after the patients quit date.  Cost $100 total for the four sessions. Tobacco cessation products are not included in the cost and are not provided by [facility].         Patient Education        Influenza (Flu) Vaccine: Care Instructions  Your Care Instructions     Influenza (flu) is an infection in the lungs and breathing passages. It is caused by the influenza virus.  There are different strains, or types, of the flu virus every year. The flu comes on quickly. It can cause a cough, stuffy nose, fever, chills, tiredness, and aches and pains. These symptoms may last up to 10 days. The flu can make you feel very sick, but most of the time it doesn't lead to other problems. But it can cause serious problems in people who are older or who have a long-term illness, such as heart disease or diabetes. You can help prevent the flu by getting a flu vaccine every year, as soon as it is available. You cannot get the flu from the vaccine. The vaccine prevents most cases of the flu. But even when the vaccine doesn't prevent the flu, it can make symptoms less severe and reduce the chance of problems from the flu. Sometimes, young children and people who have an immune system problem may have a slight fever or muscle aches or pains 6 to 12 hours after getting the shot. These symptoms usually last 1 or 2 days. Follow-up care is a key part of your treatment and safety. Be sure to make and go to all appointments, and call your doctor if you are having problems. It's also a good idea to know your test results and keep a list of the medicines you take. Who should get the flu vaccine? Everyone age 7 months or older should get a flu vaccine each year. It lowers the chance of getting and spreading the flu. The vaccine is very important for people who are at high risk for getting other health problems from the flu. This includes:  · Anyone 48years of age or older. · People who live in a long-term care center, such as a nursing home. · All children 6 months through 25years of age. · Adults and children 6 months and older who have long-term heart or lung problems, such as asthma. · Adults and children 6 months and older who needed medical care or were in a hospital during the past year because of diabetes, chronic kidney disease, or a weak immune system (including HIV or AIDS).   · Women who will be pregnant during the flu season. · People who have any condition that can make it hard to breathe or swallow (such as a brain injury or muscle disorders). · People who can give the flu to others who are at high risk for problems from the flu. This includes all health care workers and close contacts of people age 72 or older. Who should not get the flu vaccine? The person who gives the vaccine may tell you not to get it if you:  · Have a severe allergy to eggs or any part of the vaccine. · Have had a severe reaction to a flu vaccine in the past.  · Have had Guillain-Barré syndrome (GBS). · Are sick with a fever. (Get the vaccine when symptoms are gone.)  How can you care for yourself at home? · If you or your child has a sore arm or a slight fever after the shot, take an over-the-counter pain medicine, such as acetaminophen (Tylenol) or ibuprofen (Advil, Motrin). Read and follow all instructions on the label. Do not give aspirin to anyone younger than 20. It has been linked to Reye syndrome, a serious illness. · Do not take two or more pain medicines at the same time unless the doctor told you to. Many pain medicines have acetaminophen, which is Tylenol. Too much acetaminophen (Tylenol) can be harmful. When should you call for help? Call 911 anytime you think you may need emergency care. For example, call if after getting the flu vaccine:    · You have symptoms of a severe reaction to the flu vaccine. Symptoms of a severe reaction may include:  ? Severe difficulty breathing. ? Sudden raised, red areas (hives) all over your body. ? Severe lightheadedness. Call your doctor now or seek immediate medical care if after getting the flu vaccine:    · You think you are having a reaction to the flu vaccine, such as a new fever. Watch closely for changes in your health, and be sure to contact your doctor if you have any problems. Where can you learn more? Go to https://chmelvineb.health-part[facility]. org and sign in to your [employer] account. Enter S616 in the [facility] box to learn more about \"Influenza (Flu) Vaccine: Care Instructions. \"     If you do not have an account, please click on the \"Sign Up Now\" link. Current as of: December 9, 2019               Content Version: 12.6  © 4375-2644 [employer]. Care instructions adapted under license by [facility] ([facility]). If you have questions about a medical condition or this instruction, always ask your healthcare professional. [address] any warranty or liability for your use of this information.

## 2020-11-02 NOTE — PROGRESS NOTES
S: 30 y.o. female with   Chief Complaint   Patient presents with   [facility] ED Follow-up     Here to establish PCP and [facility] ED 10/24/2020 Follow-up Bronchitis       HPI: NP.  ED f/u for bronchitis. Prednisone and albuterol has helped. Daily cough and nighttime cough. Smokes 1ppd. Previously 2ppd. Losing weight intentionally. Lives as cline's. Schizophrenia. Wants to quit smoking. Taking metformin but no h/o dm2. BP Readings from Last 3 Encounters:   11/02/20 124/78   10/25/20 136/60   09/24/20 (!) 144/99     Wt Readings from Last 3 Encounters:   11/02/20 250 lb (113.4 kg)   10/24/20 250 lb (113.4 kg)   09/24/20 250 lb (113.4 kg)           O: VS:  height is 5' 3\" (1.6 m) and weight is 250 lb (113.4 kg). Her temporal temperature is 97 °F (36.1 °C). Her blood pressure is 124/78 and her pulse is 90. Her respiration is 16 and oxygen saturation is 98%. Diagnosis Orders   1. Smoker  albuterol sulfate HFA (PROVENTIL HFA) 108 (90 Base) MCG/ACT inhaler   2. Encounter for smoking cessation counseling     3. Morbid obesity ([address]         Plan:  Flu shot today. a1c at next visit. Albuterol prn to continue. F/u 1 month to revaluate breathing. Cut back on smoking. Method counseled. Health Maintenance Due   Topic Date Due    Varicella vaccine (1 of 2 - 2-dose childhood series) 04/02/1994    Pneumococcal 0-64 years Vaccine (1 of 1 - PPSV23) 04/02/1999    HIV screen  04/02/2008    DTaP/Tdap/Td vaccine (1 - Tdap) 04/02/2012    Cervical cancer screen  04/02/2014    Flu vaccine (1) 09/01/2020         Attending Physician Statement  I have discussed the case, including pertinent history and exam findings with the resident. I agree with the documented assessment and plan as documented by the resident.         Radha Magana, DO 11/2/2020 11:25 AM

## 2020-11-02 NOTE — PROGRESS NOTES
[address] [facility]. [address]  [facility] [address]  Dept: [phone]  Dept Fax: [phone]  Loc: [phone]      Levi Barnes is a 30 y.o. female who presents todayfor ED Follow-up (Here to establish PCP and [facility] ED 10/24/2020 Follow-up Bronchitis)      :   64-year-old female with past medical history of schizophrenia and bipolar type I disorder who lives at [facility] and sees psychiatry and therapy through them. She is here to establish care after ED visit for bronchitis. She smokes 1 pack/day which has decreased from her previous amount of 2 packs/day. She still is reporting some shortness of breath and nighttime and daytime cough. Denies history of asthma, shortness of breath, wheezing as a child. She is interested in quitting smoking. Reports that her symptoms have improved since taking albuterol and the prednisone. She is also trying to lose weight by walking. Reports having lost 40 pounds intentionally. She requests a flu shot today. patient has No Known Allergies. Past MedicalHistory  Tiffanie Mauricio  has a past medical history of Arthritis, Bipolar 1 disorder ([address] Fibromyalgia, and Schizophrenia ([facility] [address] Past Surgical History  The patient  has a past surgical history that includes [place] tooth extraction. Family History  This patient's family history is not on file. Social History  Tiffanie Mauricio  reports that she has been smoking cigarettes. She has been smoking about 2.00 packs per day. She has never used smokeless tobacco. She reports previous alcohol use. She reports that she does not use drugs.     Ready to quit: Not Answered  Counseling given: Yes      Medications    Current Outpatient Medications:     lurasidone (LATUDA) 60 MG TABS tablet, Take 60 mg by mouth 2 times daily, Disp: , Rfl:     venlafaxine (EFFEXOR XR) 75 MG extended release capsule, Take 75 mg by mouth daily Takes with 37.5 mg daily, Disp: , Rfl:     paliperidone palmitate ER (INVEGA SUSTENNA) 234 MG/1.5ML FABIANA IM injection, Inject into the muscle every 21 days, Disp: , Rfl:     MARCELLO 3-0.02 MG per tablet, Take 1 tablet by mouth daily , Disp: , Rfl:     trihexyphenidyl (ARTANE) 5 MG tablet, Take 5 mg by mouth 2 times daily , Disp: , Rfl:     albuterol sulfate HFA (PROVENTIL HFA) 108 (90 Base) MCG/ACT inhaler, Inhale 2 puffs into the lungs every 4 hours as needed for Wheezing or Shortness of Breath (Space out to every 6 hours as symptoms improve) Space out to every 6 hours as symptoms improve., Disp: 1 Inhaler, Rfl: 0    metFORMIN (GLUCOPHAGE) 500 MG tablet, Take 500 mg by mouth 2 times daily (with meals), Disp: , Rfl:     omeprazole (PRILOSEC) 20 MG delayed release capsule, Take 1 capsule by mouth every morning (before breakfast), Disp: 30 capsule, Rfl: 0    LORazepam (ATIVAN) 0.5 MG tablet, Take 0.5 mg by mouth 2 times daily. , Disp: , Rfl:     ondansetron (ZOFRAN ODT) 4 MG disintegrating tablet, Take 1 tablet by mouth every 8 hours as needed for Nausea or Vomiting, Disp: 10 tablet, Rfl: 0    naproxen (NAPROSYN) 500 MG tablet, Take 1 tablet by mouth 2 times daily as needed for Pain, Disp: 60 tablet, Rfl: 0    venlafaxine (EFFEXOR XR) 37.5 MG extended release capsule, Take 1 capsule by mouth daily (with breakfast) (Patient taking differently: Take 37.5 mg by mouth daily (with breakfast) Takes with 75 mg daily), Disp: 30 capsule, Rfl: 0    Multiple Vitamins-Minerals (WOMENS DAILY FORMULA PO), Take by mouth daily , Disp: , Rfl:     :     Review of Systems   Constitutional: Negative for chills, fatigue and fever. HENT: Negative for congestion, ear pain, postnasal drip and trouble swallowing. Eyes: Negative for pain and visual disturbance. Respiratory: Positive for cough. Negative for shortness of breath. Cardiovascular: Negative for chest pain and palpitations.    Gastrointestinal: Negative for abdominal pain, blood in stool, constipation and diarrhea. Genitourinary: Negative for dysuria and hematuria. Skin: Negative for rash and wound. Neurological: Negative for dizziness and headaches. Psychiatric/Behavioral: The patient is not nervous/anxious. :     Vitals:    11/02/20 1056   BP: 124/78   Site: Left Upper Arm   Position: Sitting   Cuff Size: Large Adult   Pulse: 90   Resp: 16   Temp: 97 °F (36.1 °C)   TempSrc: Temporal   SpO2: 98%   Weight: 250 lb (113.4 kg)   Height: 5' 3\" (1.6 m)       Physical Exam  Vitals signs and nursing note reviewed. Constitutional:       General: She is not in acute distress. Appearance: She is well-developed. She is obese. She is not ill-appearing or diaphoretic. Comments: Appearance disheveled   HENT:      Head: Normocephalic and atraumatic. Right Ear: External ear normal.      Left Ear: External ear normal.      Nose: Nose normal.   Eyes:      General: No scleral icterus. Right eye: No discharge. Left eye: No discharge. Conjunctiva/sclera: Conjunctivae normal.   Neck:      Musculoskeletal: Normal range of motion. Cardiovascular:      Rate and Rhythm: Normal rate and regular rhythm. Heart sounds: Normal heart sounds. No murmur. Pulmonary:      Effort: Pulmonary effort is normal.      Breath sounds: Normal breath sounds. No wheezing. Abdominal:      Palpations: Abdomen is soft. Tenderness: There is no abdominal tenderness. Skin:     General: Skin is warm and dry. Findings: No erythema or rash. Neurological:      Mental Status: She is alert and oriented to person, place, and time. Psychiatric:         Attention and Perception: Attention normal.         Mood and Affect: Affect is flat. Speech: Speech is delayed. Behavior: Behavior is slowed. Thought Content: Thought content normal.         Cognition and Memory: Cognition normal.         Judgment: Judgment normal.         Assessment/Plan:   1. Smoker  2. Encounter for smoking cessation counseling  3. Morbid obesity ([facility] [address]  -Suspect shortness of breath is due to smoking and possible COPD compounded by obesity   -I will refill the albuterol for as needed use of SOB. -Follow-up in 1 month to see how her breathing is doing  -If she is still short of breath in 4 to 6 weeks, will start Spiriva  -Flu vaccine today  -Discussed smoking cessation today including the baggy technique by putting a pack of cigarettes in 1 baggy and decreasing by 2 cigarettes every 2 days.  -No medications for smoking cessation at this time due to seeing psychiatry  -Suspect that when she quit smoking she may have worsening symptoms of schizophrenia  -Continue diet and lifestyle changes for losing weight. She has been successful so far. Consider A1c at next visit due to hyperglycemia and taking metformin (probably for PCOS due to absence of periods x3 years?)     - albuterol sulfate HFA (PROVENTIL HFA) 108 (90 Base) MCG/ACT inhaler; Inhale 2 puffs into the lungs every 4 hours as needed for Wheezing or Shortness of Breath (Space out to every 6 hours as symptoms improve) Space out to every 6 hours as symptoms improve. Dispense: 1 Inhaler; Refill: 0    Return in about 4 weeks (around 11/30/2020) for breathing f/u . Orders Placed  No orders of the defined types were placed in this encounter. Prescriptions given/sent   Orders Placed This Encounter   Medications    albuterol sulfate HFA (PROVENTIL HFA) 108 (90 Base) MCG/ACT inhaler     Sig: Inhale 2 puffs into the lungs every 4 hours as needed for Wheezing or Shortness of Breath (Space out to every 6 hours as symptoms improve) Space out to every 6 hours as symptoms improve. Dispense:  1 Inhaler     Refill:  0       Patient instructions given and reviewed. Discussed use, benefit, and side effects of prescribedmedications. All patient questions answered. Pt voiced understanding.               Electronically signed by Chey Montana, DO on 11/2/2020at 11:33 AM

## 2020-12-21 ENCOUNTER — TELEPHONE (OUTPATIENT)
Dept: FAMILY MEDICINE CLINIC | Age: 25
End: 2020-12-21

## 2020-12-22 NOTE — TELEPHONE ENCOUNTER
Dr. Pepe Sin with vascular at bedside to evaluate pt     Julia Love, RN  03/08/19 5271 Spoke with pt. Informed pt of COVID test ordered and medication instructions. Pt verbalized understanding.

## 2020-12-22 NOTE — TELEPHONE ENCOUNTER
Please call and let Carmelita Anderson know to stay quarantined in her room. I have ordered a COVID test. Please recommend that she continue drinking plenty of fluids, and that she can take vitamin C 500 mg twice a day, vitamin D 6000 IU daily, and zinc 50 mg daily. Also to make sure if she develops severe shortness of breath, lips turn blue, has bloody cough, or chest pain to go to the nearest ED.

## 2020-12-30 ENCOUNTER — TELEPHONE (OUTPATIENT)
Dept: FAMILY MEDICINE CLINIC | Age: 25
End: 2020-12-30

## 2020-12-30 NOTE — TELEPHONE ENCOUNTER
Patients last appointment was : 12/3/2020     Return in about 4 weeks (around 11/30/2020) for breathing f/u . Patients next appointment is : Visit date not found    Called [phone], No answer. Left voicemail for patient to call the office back. Levi Barnes needs to schedule an appointment at our office. I left a voicemail asking the patient to please call us back at [phone], option 1 will take them to the scheduling department, please schedule with Shani Adams, DO     COVID test completed?

## 2021-01-08 ENCOUNTER — INITIAL CONSULT (OUTPATIENT)
Dept: PULMONOLOGY | Age: 26
End: 2021-01-08
Payer: MEDICAID

## 2021-01-08 VITALS
WEIGHT: 242 LBS | OXYGEN SATURATION: 98 % | TEMPERATURE: 97.8 F | BODY MASS INDEX: 42.88 KG/M2 | HEART RATE: 91 BPM | SYSTOLIC BLOOD PRESSURE: 122 MMHG | HEIGHT: 63 IN | DIASTOLIC BLOOD PRESSURE: 72 MMHG

## 2021-01-08 DIAGNOSIS — G47.8 SLEEP RELATED EATING DISORDER: ICD-10-CM

## 2021-01-08 DIAGNOSIS — F31.9 BIPOLAR 1 DISORDER (HCC): ICD-10-CM

## 2021-01-08 DIAGNOSIS — G47.10 HYPERSOMNIA: Primary | ICD-10-CM

## 2021-01-08 DIAGNOSIS — F20.3 UNDIFFERENTIATED SCHIZOPHRENIA (HCC): ICD-10-CM

## 2021-01-08 DIAGNOSIS — G47.30 SLEEP APNEA, UNSPECIFIED TYPE: ICD-10-CM

## 2021-01-08 PROCEDURE — G8427 DOCREV CUR MEDS BY ELIG CLIN: HCPCS | Performed by: INTERNAL MEDICINE

## 2021-01-08 PROCEDURE — G8482 FLU IMMUNIZE ORDER/ADMIN: HCPCS | Performed by: INTERNAL MEDICINE

## 2021-01-08 PROCEDURE — 4004F PT TOBACCO SCREEN RCVD TLK: CPT | Performed by: INTERNAL MEDICINE

## 2021-01-08 PROCEDURE — G8417 CALC BMI ABV UP PARAM F/U: HCPCS | Performed by: INTERNAL MEDICINE

## 2021-01-08 PROCEDURE — 99204 OFFICE O/P NEW MOD 45 MIN: CPT | Performed by: INTERNAL MEDICINE

## 2021-01-08 RX ORDER — CLONAZEPAM 0.5 MG/1
0.5 TABLET ORAL 2 TIMES DAILY PRN
COMMUNITY

## 2021-01-08 NOTE — PROGRESS NOTES
Chief Complaint:  Uma Aviles is here today as a Sleep consultation referred by Cosme Ramos, has a study in [place] as a teenager more than 10 years ago, UNABLE TO OBTAIN  Mallampati airway Class:IV  Neck Circumference:16.5 Inches    [place] sleepiness score 1/8/21: {NUMBERS [id number]}.

## 2021-01-08 NOTE — PATIENT INSTRUCTIONS
Patient Education        Stopping Smoking: Care Instructions  Your Care Instructions     Cigarette smokers crave the nicotine in cigarettes. Giving it up is much harder than simply changing a habit. Your body has to stop craving the nicotine. It is hard to quit, but you can do it. There are many tools that people use to quit smoking. You may find that combining tools works best for you. There are several steps to quitting. First you get ready to quit. Then you get support to help you. After that, you learn new skills and behaviors to become a nonsmoker. For many people, a necessary step is getting and using medicine. Your doctor will help you set up the plan that best meets your needs. You may want to attend a smoking cessation program to help you quit smoking. When you choose a program, look for one that has proven success. Ask your doctor for ideas. You will greatly increase your chances of success if you take medicine as well as get counseling or join a cessation program.  Some of the changes you feel when you first quit tobacco are uncomfortable. Your body will miss the nicotine at first, and you may feel short-tempered and grumpy. You may have trouble sleeping or concentrating. Medicine can help you deal with these symptoms. You may struggle with changing your smoking habits and rituals. The last step is the tricky one: Be prepared for the smoking urge to continue for a time. This is a lot to deal with, but keep at it. You will feel better. Follow-up care is a key part of your treatment and safety. Be sure to make and go to all appointments, and call your doctor if you are having problems. It's also a good idea to know your test results and keep a list of the medicines you take. How can you care for yourself at home? · Ask your family, friends, and coworkers for support. You have a better chance of quitting if you have help and support. · Join a support group, such as Nicotine Anonymous, for people who are trying to quit smoking. · Consider signing up for a smoking cessation program, such as the American Lung Association's Freedom from Smoking program.  · Get text messaging support. Go to the website at www.smokefree. gov to sign up for the [facility] program.  · Set a quit date. Pick your date carefully so that it is not right in the middle of a big deadline or stressful time. Once you quit, do not even take a puff. Get rid of all ashtrays and lighters after your last cigarette. Clean your house and your clothes so that they do not smell of smoke. · Learn how to be a nonsmoker. Think about ways you can avoid those things that make you reach for a cigarette. ? Avoid situations that put you at greatest risk for smoking. For some people, it is hard to have a drink with friends without smoking. For others, they might skip a coffee break with coworkers who smoke. ? Change your daily routine. Take a different route to work or eat a meal in a different place. · Cut down on stress. Calm yourself or release tension by doing an activity you enjoy, such as reading a book, taking a hot bath, or gardening. · Talk to your doctor or pharmacist about nicotine replacement therapy, which replaces the nicotine in your body. You still get nicotine but you do not use tobacco. Nicotine replacement products help you slowly reduce the amount of nicotine you need. These products come in several forms, many of them available over-the-counter:  ? Nicotine patches  ? Nicotine gum and lozenges  ? Nicotine inhaler  · Ask your doctor about bupropion (Wellbutrin) or varenicline (Chantix), which are prescription medicines. They do not contain nicotine. They help you by reducing withdrawal symptoms, such as stress and anxiety. · Some people find hypnosis, acupuncture, and massage helpful for ending the smoking habit. · Eat a healthy diet and get regular exercise. Having healthy habits will help your body move past its craving for nicotine. · Be prepared to keep trying. Most people are not successful the first few times they try to quit. Do not get mad at yourself if you smoke again. Make a list of things you learned and think about when you want to try again, such as next week, next month, or next year. Where can you learn more? Go to https://[employer]abetes America. org and sign in to your threadsy account. Enter B032 in the Waggl box to learn more about \"Stopping Smoking: Care Instructions. \"     If you do not have an account, please click on the \"Sign Up Now\" link. Current as of: March 12, 2020               Content Version: 12.6  © 0160-1492 [employer]. Care instructions adapted under license by [facility] ([facility]). If you have questions about a medical condition or this instruction, always ask your healthcare professional. [place] [address] any warranty or liability for your use of this information. Recommendations/Plan:  -Will schedule patient for polysomnogram in the sleep lab with a parasomnia montage due to her history of eating disorder during sleep.  -She was advised to discuss with her psychiatrist Dr. Lizzeth Comer MD regarding changing her current antidepressant and anti psychotic medications to a different class of medication regimen to improve her excessive daytime sleepiness (hypersomnia). -I had a discussion with patient regarding avialable treatment options for her sleep disorder breathing including but not limited to CPAP titration in the sleep lab Vs.Dental appliance placement with referral to a local dentist Vs other available surgical options including Uvulopalatopharyngoplasty, maxillomandibular ostomy and tracheostomy as last option.  At the end of discussion, she is not decided on her treatment if she found to have obstructive sleep apnea at this time.  -She was educated about sleep restriction therapy and advised to practice to improve her insomnia. -She was educated about stimulus control therapy and advise to practice to improve her insomnia. -We will see Marlin Jj back in 1week after the sleep study to go over the sleep study results and further management options.  -She was educated to practice good sleep hygiene practices. She  was provided with a good sleep hygiene hand out.  -Justin Peterson was advised to make earlier appointment with my clinic if she develops any worsening of sleep symptoms. She verbalizes understanding.  -Justin Peterson was advised to not to drive any motor vehicles or operate heavy equipment until her sleep symptoms are under good control. Ciarra Baez verbalizes understanding.  -She was advised to loose weight by controlling diet and doing exercise once cleared by her family physician.   - Marlin Jj was educated about my impression and plan. She verbalizes understanding.

## 2021-02-11 ENCOUNTER — HOSPITAL ENCOUNTER (OUTPATIENT)
Age: 26
Setting detail: SPECIMEN
Discharge: HOME | End: 2021-02-11
Payer: MEDICAID

## 2021-02-11 PROCEDURE — U0003 INFECTIOUS AGENT DETECTION BY NUCLEIC ACID (DNA OR RNA); SEVERE ACUTE RESPIRATORY SYNDROME CORONAVIRUS 2 (SARS-COV-2) (CORONAVIRUS DISEASE [COVID-19]), AMPLIFIED PROBE TECHNIQUE, MAKING USE OF HIGH THROUGHPUT TECHNOLOGIES AS DESCRIBED BY CMS-2020-01-R: HCPCS

## 2021-02-13 LAB — SARS-COV-2: NOT DETECTED

## 2021-02-23 ENCOUNTER — HOSPITAL ENCOUNTER (OUTPATIENT)
Dept: SLEEP CENTER | Age: 26
Discharge: HOME | End: 2021-02-25
Payer: MEDICAID

## 2021-02-23 DIAGNOSIS — F31.9 BIPOLAR 1 DISORDER (HCC): ICD-10-CM

## 2021-02-23 DIAGNOSIS — G47.8 SLEEP RELATED EATING DISORDER: ICD-10-CM

## 2021-02-23 DIAGNOSIS — G47.10 HYPERSOMNIA: ICD-10-CM

## 2021-02-23 DIAGNOSIS — G47.30 SLEEP APNEA, UNSPECIFIED TYPE: ICD-10-CM

## 2021-02-23 DIAGNOSIS — F20.3 UNDIFFERENTIATED SCHIZOPHRENIA (HCC): ICD-10-CM

## 2021-02-23 PROCEDURE — 95810 POLYSOM 6/> YRS 4/> PARAM: CPT

## 2021-02-24 LAB — STATUS: NORMAL

## 2021-02-25 NOTE — PROGRESS NOTES
[address] [place] [address]                               SLEEP STUDY REPORT    PATIENT NAME: Abel Bell               :        1995  MED REC NO:   [id number]                           ROOM:  ACCOUNT NO:   [de-identified]                           ADMIT DATE: 2021  PROVIDER:     Ranjit Rivero. MD Diann    DATE OF STUDY:  2021    REFERRING PROVIDER:  Jon Crews CNP    The patient's height is 63 inches, weight is 242 pounds. The patient's  BMI was 42.9. HISTORY:  The patient is a 64-year-old female who was recently evaluated  by me on 2021. The patient was diagnosed with obstructive sleep  apnea more than 10 years back by a sleep study performed at a sleep  facility in [place]. No old records are available. The patient is  currently suffering from hypersomnia with [place] Sleepiness Score of  14. She also gave a history of witnessed apneas. The patient is  scheduled for sleep study to evaluate for sleep apnea. She also gave a  history of eating during sleep, hence the study was ordered with  parasomnia montage. She had associated comorbidities including  schizophrenia, bipolar disorder, and anxiety disorder. METHODS:  The patient underwent digital polysomnography in compliance  with the standards and specifications from the AASM Manual including the  simultaneous recording of 3 EEG channels (F4-M1, C4-M1, and O2-M1 with  back up electrodes F3-M2, C3-M2, and O1-M2), 2 EOG channels (E1-M2, and  E2-M1,), EMG (chin, left & right leg), EKG, Nonin pulse oximetry with  less than 2 second averaging time, body position, airflow recorded by  oral-nasal thermal sensor and nasal air pressure transducer, plus  respiratory effort recorded by calibrated respiratory inductance  plethysmography (RIP), flow volume loop, sound and video.   Sleep staging and scoring followed the standard put forth by the American Academy of  Sleep Medicine and utilized the 4A obstructive hypopnea event  desaturation of 4 percent or greater. INTERPRETATION:  This is a baseline sleep study which was performed with  parasomnia montage. The study was performed on 02/23/2021. The study  was started at 08:36 p.m. and was terminated at 05:17 a.m. with a total  recording time of 520.9 minutes, sleep period time was 490.4 minutes,  and total sleep time was 465 minutes. Overall sleep efficiency was  89.3%. The sleep onset latency was 30.5 minutes, wake after sleep onset  was 25.4 minutes, REM sleep latency was 215 minutes. SLEEP STAGING AND DISTRIBUTION SUMMARY:  Revealed the patient spent 89  minutes in stage I consisting of 19.1%, 241 minutes in stage II  consisting of 51.8%, 35.5 minutes in stage III consisting of 7.6%, 99.5  minutes in REM sleep consisting of 21.4% of total sleep time. RESPIRATORY EVENT ANALYSIS:  Revealed the patient did not have any  apneas. The patient had a total of 20 hypopneas. All 20 were  obstructive in nature. The total number of apneas and hypopneas  recorded during the study was 20 with an apnea-hypopnea index of 2.6. The patient's REM sleep apnea-hypopnea index was 6.6. POSITION ANALYSIS:  Revealed the patient spent 151.6 minutes in supine  position, 313.5 minutes in right lateral position with a supine  apnea-hypopnea index of 2.8, whereas right lateral position  apnea-hypopnea index was 2.1. PERIODIC LIMB MOVEMENT ANALYSIS:  Revealed the patient did not have any  periodic limb movements. The patient had a total of 23 spontaneous  arousals with a spontaneous arousal index of 3. OXYGEN SATURATION MONITORING:  Revealed the patient had a maximum oxygen  desaturation to 84% with a mean oxygen saturation of 94.5%. The patient  spent a total of 0.6 minutes below oxygen saturation less than 88%. EKG MONITORING:  Revealed normal sinus rhythm with sinus arrhythmia. The patient noted have mild snoring. No abnormal activity was noted during the REM sleep. No sleep eating  was noted. IMPRESSION:  1. Mild snoring with no clinically significant obstructive sleep apnea. 2.  Delayed REM sleep onset. 3.  Hypersomnia of uncertain etiology, most likely due to patient's  current medications with side effects of sedation including Klonopin,  Trintellix, and Latuda. 4.  Anxiety disorder, currently on treatment with Klonopin. 5.  Bipolar type 1 disorder. 6.  Schizophrenia, on treatment with medications, currently following  with Amanuel Duran MD.  7.  Fibromyalgia. RECOMMENDATIONS:  The patient should be scheduled for followup in my  clinic as soon as possible to discuss about the sleep study findings for  further management. Thanks to Sowmya Hopkins CNP, for giving me this opportunity to  participate in the care of this pleasant lady.         Eartha Boast, MD    D: 02/24/2021 20:17:09       T: 02/25/2021 6:31:27     [id number]  Job#: [id number]     Doc#: [id number]    CC:

## 2021-02-26 NOTE — PROGRESS NOTES
[facility]    Ankur Calvillo, 30 y.o.  [id number]      Pt of [facility]    Nurses Notes   Psg results    Study Results  Initial Study Date -  2/23/21  AHI -  2.6    Total Events - 20  (Apneas 0  Hypopneas 20  Central 0)  Sleep Latency: 30.5 minutes. REM Latency: 215 minutes. LM w/Arousals - 0  Sleep Efficiency - 89.3% (Total Sleep Time - 490.4 min)  Time with Sats below 88% - 0.6 min. BMI 43  Weight 242 lbs  Neck 16.5 inches  Mallampati: IV   ESS 14  SAQLI: 55    Interval History       Ankur Calvillo is a 30 y.o. old female who comes in to review the results of her recent sleep study, to answer questions and to explore options for treatment. Diagnosed with TU > 10 years ago. Reports AHI of 27. Intolerant to CPAP. Has gained over 100 lbs. Hx of Bipolar Disorder/Schizophrenia, referred for insomnia, multiple awakenings. Many sedating medications: Fanapt, Buspar, Tegretol, Klonopin, Invega, Trintellix, Artane. Naps daily. Smokes 2 PPD.     PMHx  Past Medical History:   Diagnosis Date    Arthritis     Bipolar 1 disorder ([facility]     Fibromyalgia     Schizophrenia ([facility]     Stomach pain      Past Surgical History:   Procedure Laterality Date    WISDOM TOOTH EXTRACTION       Social History     Tobacco Use    Smoking status: Current Every Day Smoker     Packs/day: 2.00     Types: Cigarettes    Smokeless tobacco: Never Used   Substance Use Topics    Alcohol use: Not Currently    Drug use: No     Family History   Problem Relation Age of Onset    Colon Cancer Neg Hx     Colon Polyps Neg Hx     Esophageal Cancer Neg Hx      Allergies  No Known Allergies  Meds  Current Outpatient Medications   Medication Sig Dispense Refill    iloperidone (FANAPT) 2 MG TABS tablet Take 2 mg by mouth 2 times daily      busPIRone (BUSPAR) 5 MG tablet       carBAMazepine (TEGRETOL) 200 MG tablet Take 200 mg by mouth 3 times daily  pantoprazole (PROTONIX) 40 MG tablet Take 1 tablet by mouth every morning (before breakfast) 30 tablet 6    clonazePAM (KLONOPIN) 0.5 MG tablet Take 0.5 mg by mouth 2 times daily as needed.  VORTIoxetine (TRINTELLIX) 5 MG tablet Take 10 mg by mouth daily      paliperidone palmitate ER (INVEGA SUSTENNA) 234 MG/1.5ML FABIANA IM injection Inject into the muscle every 21 days      trihexyphenidyl (ARTANE) 5 MG tablet Take 5 mg by mouth 2 times daily       albuterol sulfate HFA (PROVENTIL HFA) 108 (90 Base) MCG/ACT inhaler Inhale 2 puffs into the lungs every 4 hours as needed for Wheezing or Shortness of Breath (Space out to every 6 hours as symptoms improve) Space out to every 6 hours as symptoms improve. 1 Inhaler 0    metFORMIN (GLUCOPHAGE) 500 MG tablet Take 500 mg by mouth 2 times daily (with meals)       No current facility-administered medications for this visit. ROS  Review of Systems  General/Constitutional: No recent loss of weight or appetite changes. No fever or chills. HENT: Negative. Eyes: Negative. Upper respiratory tract: No nasal stuffiness or post nasal drip. Lower respiratory tract/ lungs: No cough or sputum production. No hemoptysis. Cardiovascular: No palpitations or chest pain. Gastrointestinal: No nausea or vomiting. Neurological: No focal neurologiacal weakness. Extremities: No increased edema. Musculoskeletal: No new complaints. Genitourinary: No complaints. Hematological: Negative. Psychiatric/Behavioral: Negative. Skin: No itching, rashes, discoloration. Exam  Vitals -  /68 (Site: Right Lower Arm, Position: Sitting, Cuff Size: Medium Adult)   Pulse 122   Temp 97.8 °F (36.6 °C)   Ht 5' 3\" (1.6 m)   Wt 241 lb (109.3 kg)   SpO2 98% Comment: room air at rest  BMI 42.69 kg/m²    Body mass index is 42.69 kg/m². Oxygen level -94.5 Room Air  Physical Exam   General Appearance - Moderately built, moderately nourished, in no acute distress. On RA. HEENT - Head is normocephalic, atraumatic. PERRL. Oral mucosa pink and moist, no oral thrush. Mallampati Score - IV (only hard palate visible). Neck - Supple, symmetrical, trachea midline and soft. Lungs - Clear to auscultation, no wheezes, rales or rhonchi, aeration good. Cardiovascular - Heart sounds are normal. Regular rhythm normal rate without murmur, gallop or rub. Abdomen - Soft, nontender, non-distended. Neurologic - Alert and oriented x 3. Skin - No bruising or bleeding. Extremities - No cyanosis, clubbing or edema. Assessment   Diagnosis Orders   1. Hypersomnia     2. Psychophysiological insomnia     3. Poor sleep hygiene     4. Fibromyalgia     5. Class 3 severe obesity due to excess calories without serious comorbidity with body mass index (BMI) of 40.0 to 44.9 in adult ([facility]     6. Cigarette nicotine dependence with nicotine-induced disorder        Recommendations  I reviewed sleep study results in detail with Joe Moran with no indication for treatment of SBD. Normal AHI prone and right side. Soft snoring. She is on many sedating medications that contribute to hypersomnia, leading to frequent napping that will impact sleeping at night/insomnia. Also deals with pain/fibromyalgia. Educated on sleep hygiene measures. Weight loss. Recommend smoking cessation and limit caffeine. Follow up PRN. 20 minutes was spent on this visit with > 50% being face to face obtaining HPI, examining patient, reviewing PSG results, educating and coordinating a treatment plan.       Electronically signed by NAMAN Brewster CNP on 3/1/2021 at 9:18 AM

## 2021-03-01 ENCOUNTER — OFFICE VISIT (OUTPATIENT)
Dept: PULMONOLOGY | Age: 26
End: 2021-03-01
Payer: MEDICAID

## 2021-03-01 VITALS
DIASTOLIC BLOOD PRESSURE: 68 MMHG | BODY MASS INDEX: 42.7 KG/M2 | OXYGEN SATURATION: 98 % | WEIGHT: 241 LBS | SYSTOLIC BLOOD PRESSURE: 116 MMHG | HEIGHT: 63 IN | TEMPERATURE: 97.8 F | HEART RATE: 122 BPM

## 2021-03-01 DIAGNOSIS — F17.219 CIGARETTE NICOTINE DEPENDENCE WITH NICOTINE-INDUCED DISORDER: ICD-10-CM

## 2021-03-01 DIAGNOSIS — Z72.821 POOR SLEEP HYGIENE: ICD-10-CM

## 2021-03-01 DIAGNOSIS — F51.04 PSYCHOPHYSIOLOGICAL INSOMNIA: ICD-10-CM

## 2021-03-01 DIAGNOSIS — E66.01 CLASS 3 SEVERE OBESITY DUE TO EXCESS CALORIES WITHOUT SERIOUS COMORBIDITY WITH BODY MASS INDEX (BMI) OF 40.0 TO 44.9 IN ADULT (HCC): ICD-10-CM

## 2021-03-01 DIAGNOSIS — G47.10 HYPERSOMNIA: Primary | ICD-10-CM

## 2021-03-01 DIAGNOSIS — M79.7 FIBROMYALGIA: ICD-10-CM

## 2021-03-01 PROCEDURE — 4004F PT TOBACCO SCREEN RCVD TLK: CPT | Performed by: NURSE PRACTITIONER

## 2021-03-01 PROCEDURE — G8482 FLU IMMUNIZE ORDER/ADMIN: HCPCS | Performed by: NURSE PRACTITIONER

## 2021-03-01 PROCEDURE — G8427 DOCREV CUR MEDS BY ELIG CLIN: HCPCS | Performed by: NURSE PRACTITIONER

## 2021-03-01 PROCEDURE — 99213 OFFICE O/P EST LOW 20 MIN: CPT | Performed by: NURSE PRACTITIONER

## 2021-03-01 PROCEDURE — G8417 CALC BMI ABV UP PARAM F/U: HCPCS | Performed by: NURSE PRACTITIONER

## 2021-04-11 ENCOUNTER — APPOINTMENT (OUTPATIENT)
Dept: ULTRASOUND IMAGING | Age: 26
End: 2021-04-11
Payer: MEDICAID

## 2021-04-11 ENCOUNTER — APPOINTMENT (OUTPATIENT)
Dept: GENERAL RADIOLOGY | Age: 26
End: 2021-04-11
Payer: MEDICAID

## 2021-04-11 ENCOUNTER — HOSPITAL ENCOUNTER (EMERGENCY)
Age: 26
Discharge: HOME | End: 2021-04-11
Attending: EMERGENCY MEDICINE
Payer: MEDICAID

## 2021-04-11 VITALS
DIASTOLIC BLOOD PRESSURE: 70 MMHG | BODY MASS INDEX: 42.52 KG/M2 | OXYGEN SATURATION: 98 % | RESPIRATION RATE: 18 BRPM | TEMPERATURE: 97.6 F | WEIGHT: 240 LBS | HEART RATE: 93 BPM | HEIGHT: 63 IN | SYSTOLIC BLOOD PRESSURE: 121 MMHG

## 2021-04-11 DIAGNOSIS — R10.11 RIGHT UPPER QUADRANT ABDOMINAL PAIN: Primary | ICD-10-CM

## 2021-04-11 LAB
ACETAMINOPHEN LEVEL: < 5 UG/ML (ref 0–20)
ALBUMIN SERPL-MCNC: 4.1 G/DL (ref 3.5–5.1)
ALP BLD-CCNC: 60 U/L (ref 38–126)
ALT SERPL-CCNC: 17 U/L (ref 11–66)
AMMONIA: 25 UMOL/L (ref 11–60)
AMPHETAMINE+METHAMPHETAMINE URINE SCREEN: NEGATIVE
ANION GAP SERPL CALCULATED.3IONS-SCNC: 12 MEQ/L (ref 8–16)
AST SERPL-CCNC: 13 U/L (ref 5–40)
ATYPICAL LYMPHOCYTES: ABNORMAL %
BACTERIA: ABNORMAL /HPF
BARBITURATE QUANTITATIVE URINE: NEGATIVE
BASOPHILS # BLD: 0.4 %
BASOPHILS ABSOLUTE: 0.1 THOU/MM3 (ref 0–0.1)
BENZODIAZEPINE QUANTITATIVE URINE: NEGATIVE
BILIRUB SERPL-MCNC: < 0.2 MG/DL (ref 0.3–1.2)
BILIRUBIN DIRECT: < 0.2 MG/DL (ref 0–0.3)
BILIRUBIN URINE: NEGATIVE
BLOOD, URINE: ABNORMAL
BUN BLDV-MCNC: 8 MG/DL (ref 7–22)
CALCIUM SERPL-MCNC: 9.2 MG/DL (ref 8.5–10.5)
CANNABINOID QUANTITATIVE URINE: NEGATIVE
CASTS 2: ABNORMAL /LPF
CASTS UA: ABNORMAL /LPF
CHARACTER, URINE: CLEAR
CHLORIDE BLD-SCNC: 106 MEQ/L (ref 98–111)
CO2: 21 MEQ/L (ref 23–33)
COCAINE METABOLITE QUANTITATIVE URINE: NEGATIVE
COLOR: YELLOW
CREAT SERPL-MCNC: 1.1 MG/DL (ref 0.4–1.2)
CRYSTALS, UA: ABNORMAL
EKG ATRIAL RATE: 108 BPM
EKG P AXIS: 61 DEGREES
EKG P-R INTERVAL: 124 MS
EKG Q-T INTERVAL: 350 MS
EKG QRS DURATION: 80 MS
EKG QTC CALCULATION (BAZETT): 469 MS
EKG R AXIS: 21 DEGREES
EKG T AXIS: 49 DEGREES
EKG VENTRICULAR RATE: 108 BPM
EOSINOPHIL # BLD: 1.3 %
EOSINOPHILS ABSOLUTE: 0.2 THOU/MM3 (ref 0–0.4)
EPITHELIAL CELLS, UA: ABNORMAL /HPF
ERYTHROCYTE [DISTWIDTH] IN BLOOD BY AUTOMATED COUNT: 13.4 % (ref 11.5–14.5)
ERYTHROCYTE [DISTWIDTH] IN BLOOD BY AUTOMATED COUNT: 45.5 FL (ref 35–45)
GFR SERPL CREATININE-BSD FRML MDRD: 59 ML/MIN/1.73M2
GLUCOSE BLD-MCNC: 103 MG/DL (ref 70–108)
GLUCOSE URINE: NEGATIVE MG/DL
HCT VFR BLD CALC: 39.7 % (ref 37–47)
HEMOGLOBIN: 12.7 GM/DL (ref 12–16)
IMMATURE GRANS (ABS): 0.08 THOU/MM3 (ref 0–0.07)
IMMATURE GRANULOCYTES: 0.5 %
KETONES, URINE: NEGATIVE
LEUKOCYTE ESTERASE, URINE: NEGATIVE
LIPASE: 46.5 U/L (ref 5.6–51.3)
LYMPHOCYTES # BLD: 27.9 %
LYMPHOCYTES ABSOLUTE: 4.7 THOU/MM3 (ref 1–4.8)
MCH RBC QN AUTO: 29.5 PG (ref 26–33)
MCHC RBC AUTO-ENTMCNC: 32 GM/DL (ref 32.2–35.5)
MCV RBC AUTO: 92.3 FL (ref 81–99)
MISCELLANEOUS 2: ABNORMAL
MONOCYTES # BLD: 4.6 %
MONOCYTES ABSOLUTE: 0.8 THOU/MM3 (ref 0.4–1.3)
NITRITE, URINE: NEGATIVE
NUCLEATED RED BLOOD CELLS: 0 /100 WBC
OPIATES, URINE: NEGATIVE
OSMOLALITY CALCULATION: 276.1 MOSMOL/KG (ref 275–300)
OXYCODONE: NEGATIVE
PH UA: 6.5 (ref 5–9)
PHENCYCLIDINE QUANTITATIVE URINE: NEGATIVE
PLATELET # BLD: 377 THOU/MM3 (ref 130–400)
PMV BLD AUTO: 9.6 FL (ref 9.4–12.4)
POTASSIUM SERPL-SCNC: 3.7 MEQ/L (ref 3.5–5.2)
PREGNANCY, URINE: NEGATIVE
PROTEIN UA: NEGATIVE
RBC # BLD: 4.3 MILL/MM3 (ref 4.2–5.4)
RBC URINE: ABNORMAL /HPF
RENAL EPITHELIAL, UA: ABNORMAL
SCAN OF BLOOD SMEAR: NORMAL
SEG NEUTROPHILS: 65.3 %
SEGMENTED NEUTROPHILS ABSOLUTE COUNT: 11 THOU/MM3 (ref 1.8–7.7)
SODIUM BLD-SCNC: 139 MEQ/L (ref 135–145)
SPECIFIC GRAVITY, URINE: 1.02 (ref 1–1.03)
TOTAL PROTEIN: 6.8 G/DL (ref 6.1–8)
TROPONIN T: < 0.01 NG/ML
UROBILINOGEN, URINE: 0.2 EU/DL (ref 0–1)
WBC # BLD: 16.9 THOU/MM3 (ref 4.8–10.8)
WBC UA: ABNORMAL /HPF
YEAST: ABNORMAL

## 2021-04-11 PROCEDURE — 99284 EMERGENCY DEPT VISIT MOD MDM: CPT

## 2021-04-11 PROCEDURE — 83690 ASSAY OF LIPASE: CPT

## 2021-04-11 PROCEDURE — 82248 BILIRUBIN DIRECT: CPT

## 2021-04-11 PROCEDURE — 82140 ASSAY OF AMMONIA: CPT

## 2021-04-11 PROCEDURE — 81001 URINALYSIS AUTO W/SCOPE: CPT

## 2021-04-11 PROCEDURE — 80143 DRUG ASSAY ACETAMINOPHEN: CPT

## 2021-04-11 PROCEDURE — 36415 COLL VENOUS BLD VENIPUNCTURE: CPT

## 2021-04-11 PROCEDURE — 6360000002 HC RX W HCPCS: Performed by: STUDENT IN AN ORGANIZED HEALTH CARE EDUCATION/TRAINING PROGRAM

## 2021-04-11 PROCEDURE — 76705 ECHO EXAM OF ABDOMEN: CPT

## 2021-04-11 PROCEDURE — 2580000003 HC RX 258: Performed by: STUDENT IN AN ORGANIZED HEALTH CARE EDUCATION/TRAINING PROGRAM

## 2021-04-11 PROCEDURE — 71045 X-RAY EXAM CHEST 1 VIEW: CPT

## 2021-04-11 PROCEDURE — 96374 THER/PROPH/DIAG INJ IV PUSH: CPT

## 2021-04-11 PROCEDURE — 80053 COMPREHEN METABOLIC PANEL: CPT

## 2021-04-11 PROCEDURE — 81025 URINE PREGNANCY TEST: CPT

## 2021-04-11 PROCEDURE — 80307 DRUG TEST PRSMV CHEM ANLYZR: CPT

## 2021-04-11 PROCEDURE — 93005 ELECTROCARDIOGRAM TRACING: CPT | Performed by: EMERGENCY MEDICINE

## 2021-04-11 PROCEDURE — 84484 ASSAY OF TROPONIN QUANT: CPT

## 2021-04-11 PROCEDURE — 85025 COMPLETE CBC W/AUTO DIFF WBC: CPT

## 2021-04-11 RX ORDER — 0.9 % SODIUM CHLORIDE 0.9 %
1000 INTRAVENOUS SOLUTION INTRAVENOUS ONCE
Status: COMPLETED | OUTPATIENT
Start: 2021-04-11 | End: 2021-04-11

## 2021-04-11 RX ORDER — ONDANSETRON 2 MG/ML
4 INJECTION INTRAMUSCULAR; INTRAVENOUS ONCE
Status: COMPLETED | OUTPATIENT
Start: 2021-04-11 | End: 2021-04-11

## 2021-04-11 RX ORDER — KETOROLAC TROMETHAMINE 30 MG/ML
15 INJECTION, SOLUTION INTRAMUSCULAR; INTRAVENOUS ONCE
Status: COMPLETED | OUTPATIENT
Start: 2021-04-11 | End: 2021-04-11

## 2021-04-11 RX ADMIN — KETOROLAC TROMETHAMINE 15 MG: 30 INJECTION, SOLUTION INTRAMUSCULAR at 14:08

## 2021-04-11 RX ADMIN — ONDANSETRON 4 MG: 2 INJECTION INTRAMUSCULAR; INTRAVENOUS at 14:08

## 2021-04-11 RX ADMIN — SODIUM CHLORIDE 1000 ML: 9 INJECTION, SOLUTION INTRAVENOUS at 14:08

## 2021-04-11 ASSESSMENT — ENCOUNTER SYMPTOMS
BACK PAIN: 1
NAUSEA: 0
COUGH: 0
RHINORRHEA: 0
ABDOMINAL PAIN: 1
SINUS PAIN: 0
DIARRHEA: 0
SORE THROAT: 0
SHORTNESS OF BREATH: 1
VOMITING: 0
EYE REDNESS: 0

## 2021-04-11 ASSESSMENT — PAIN SCALES - GENERAL: PAINLEVEL_OUTOF10: 4

## 2021-04-11 NOTE — ED PROVIDER NOTES
Genitourinary: Negative for dysuria and hematuria. Musculoskeletal: Positive for back pain. Skin: Negative for rash. Neurological: Negative for dizziness, light-headedness and headaches. Psychiatric/Behavioral: Negative for agitation. PAST MEDICAL AND SURGICAL HISTORY     Past Medical History:   Diagnosis Date    Arthritis     Bipolar 1 disorder ([facility]     Fibromyalgia     Schizophrenia ([facility]     Stomach pain      Past Surgical History:   Procedure Laterality Date    COLONOSCOPY      EGD      WISDOM TOOTH EXTRACTION           MEDICATIONS   No current facility-administered medications for this encounter. Current Outpatient Medications:     iloperidone (FANAPT) 2 MG TABS tablet, Take 2 mg by mouth 2 times daily, Disp: , Rfl:     busPIRone (BUSPAR) 5 MG tablet, , Disp: , Rfl:     carBAMazepine (TEGRETOL) 200 MG tablet, Take 200 mg by mouth 3 times daily, Disp: , Rfl:     pantoprazole (PROTONIX) 40 MG tablet, Take 1 tablet by mouth every morning (before breakfast), Disp: 30 tablet, Rfl: 6    clonazePAM (KLONOPIN) 0.5 MG tablet, Take 0.5 mg by mouth 2 times daily as needed. , Disp: , Rfl:     VORTIoxetine (TRINTELLIX) 5 MG tablet, Take 10 mg by mouth daily, Disp: , Rfl:     paliperidone palmitate ER (INVEGA SUSTENNA) 234 MG/1.5ML FABIANA IM injection, Inject into the muscle every 21 days, Disp: , Rfl:     trihexyphenidyl (ARTANE) 5 MG tablet, Take 5 mg by mouth 2 times daily , Disp: , Rfl:     albuterol sulfate HFA (PROVENTIL HFA) 108 (90 Base) MCG/ACT inhaler, Inhale 2 puffs into the lungs every 4 hours as needed for Wheezing or Shortness of Breath (Space out to every 6 hours as symptoms improve) Space out to every 6 hours as symptoms improve., Disp: 1 Inhaler, Rfl: 0    metFORMIN (GLUCOPHAGE) 500 MG tablet, Take 500 mg by mouth 2 times daily (with meals), Disp: , Rfl:       SOCIAL HISTORY     Social History     Social History Narrative    Not on file     Social History     Tobacco Use    Smoking status: Current Every Day Smoker     Packs/day: 2.00     Types: Cigarettes    Smokeless tobacco: Never Used   Substance Use Topics    Alcohol use: Not Currently    Drug use: No         ALLERGIES   No Known Allergies      FAMILY HISTORY     Family History   Problem Relation Age of Onset    Colon Cancer Neg Hx     Colon Polyps Neg Hx     Esophageal Cancer Neg Hx          PREVIOUS RECORDS   Previous records reviewed: Patient seen here on 10/24/2020 for bronchitis. PHYSICAL EXAM     ED Triage Vitals [04/11/21 1247]   BP Temp Temp Source Pulse Resp SpO2 Height Weight   (!) 151/88 97.6 °F (36.4 °C) Oral 117 18 97 % 5' 3\" (1.6 m) 240 lb (108.9 kg)     Initial vital signs and nursing assessment reviewed and abnormal from tachycardia. Pulsoximetry is normal per my interpretation. Additional Vital Signs:  Vitals:    04/11/21 1409   BP: 114/79   Pulse: 88   Resp: 18   Temp:    SpO2: 97%       Physical Exam  Vitals signs and nursing note reviewed. Constitutional:       General: She is not in acute distress. Appearance: Normal appearance. She is not ill-appearing. HENT:      Head: Normocephalic and atraumatic. Right Ear: External ear normal.      Left Ear: External ear normal.      Nose: Nose normal.      Mouth/Throat:      Mouth: Mucous membranes are dry. Pharynx: Oropharynx is clear. Eyes:      General: No scleral icterus. Conjunctiva/sclera: Conjunctivae normal.   Neck:      Musculoskeletal: Normal range of motion and neck supple. No neck rigidity or muscular tenderness. Cardiovascular:      Rate and Rhythm: Regular rhythm. Tachycardia present. Pulses: Normal pulses. Heart sounds: Normal heart sounds. No murmur. Pulmonary:      Effort: Pulmonary effort is normal. No respiratory distress. Breath sounds: Normal breath sounds. No wheezing. Chest:      Chest wall: No tenderness. Abdominal:      General: Abdomen is flat.  Bowel sounds are normal. There is no distension. Palpations: Abdomen is soft. Tenderness: There is no abdominal tenderness. There is no guarding or rebound. Musculoskeletal: Normal range of motion. Right lower leg: No edema. Left lower leg: No edema. Lymphadenopathy:      Cervical: No cervical adenopathy. Skin:     General: Skin is warm and dry. Capillary Refill: Capillary refill takes less than 2 seconds. Neurological:      General: No focal deficit present. Mental Status: She is alert and oriented to person, place, and time. Psychiatric:         Mood and Affect: Mood normal.         Behavior: Behavior normal.         MEDICAL DECISION MAKING   Initial Assessment: This is a 47-year-old female with past medical history of bipolar, schizophrenia who comes in with waxing waning right upper quadrant abdominal pain has been progressively worse in the past few days. Patient has a constellation of symptoms including shortness of breath cough fatigue abdominal pain that all seems to be gone since quitting methamphetamines 3 years ago she states. She mentions self-medicating with Tylenol taking at 1000 mg every few hours for her pain, prolonged discussion occurred with patient regarding the proper dosage of Tylenol and how often to take it and the concerns for possible overdosing. Differential Diagnosis Included but not limited to: Acetaminophen toxicity, cholecystitis, cholangitis, ACS, pneumonia    MDM:   Patient is a Noncon/symptoms will order right upper quadrant ultrasound for further assessment of patient's liver status. Will order a Tylenol level in her for further assessment of possible acetaminophen toxicity. We will also obtain other laboratory studies to further gauge for any other metabolic derangements. Is given Toradol for pain control as well as Zofran for nausea control. Started on fluids for tachycardia.         ED RESULTS   Laboratory results:  Labs Reviewed   CBC WITH AUTO DIFFERENTIAL - Abnormal; Notable for the following components:       Result Value    WBC 16.9 (*)     MCHC 32.0 (*)     RDW-SD 45.5 (*)     Segs Absolute 11.0 (*)     Immature Grans (Abs) 0.08 (*)     All other components within normal limits   BASIC METABOLIC PANEL - Abnormal; Notable for the following components:    CO2 21 (*)     All other components within normal limits   HEPATIC FUNCTION PANEL - Abnormal; Notable for the following components: Total Bilirubin <0.2 (*)     All other components within normal limits   URINE WITH REFLEXED MICRO - Abnormal; Notable for the following components:    Blood, Urine TRACE (*)     All other components within normal limits   GLOMERULAR FILTRATION RATE, ESTIMATED - Abnormal; Notable for the following components:    Est, Glom Filt Rate 59 (*)     All other components within normal limits   LIPASE   TROPONIN   PREGNANCY, URINE   URINE DRUG SCREEN   AMMONIA   ACETAMINOPHEN LEVEL   ANION GAP   OSMOLALITY   SCAN OF BLOOD SMEAR       Radiologic studies results:  XR CHEST PORTABLE   Final Result      No acute intrathoracic process. **This report has been created using voice recognition software. It may contain minor errors which are inherent in voice recognition technology. **      Final report electronically signed by Dr. Usha Harrington on 4/11/2021 2:25 PM      [address] [employer]   Final Result      Normal gallbladder ultrasound without evidence of cholelithiasis.       Final report electronically signed by Dr. Usha Harrington on 4/11/2021 2:35 PM          ED Medications administered this visit:   Medications   0.9 % sodium chloride bolus (1,000 mLs Intravenous New Bag 4/11/21 1408)   ketorolac (TORADOL) injection 15 mg (15 mg Intravenous Given 4/11/21 1408)   ondansetron (ZOFRAN) injection 4 mg (4 mg Intravenous Given 4/11/21 1408)         ED COURSE     ED Course as of Apr 11 1511   Sun Apr 11, 2021   1342 WBC(!): 16.9 [AL]   1343 No signs of infection, trace blood was present in urine.   Urine with Reflexed Micro(!):    Glucose, UA NEGATIVE   Bilirubin, Urine NEGATIVE   Ketones, Urine NEGATIVE   Specific Gravity, Urine 1.016   Blood, Urine TRACE(!)   pH, UA 6.5   Protein, UA NEGATIVE   Urobilinogen, Urine 0.2   Nitrite, Urine NEGATIVE   Leukocyte Esterase, Urine NEGATIVE   Color, UA YELLOW   Character, Urine CLEAR   RBC, UA 5-10   WBC, UA NONE SEEN   Epithelial Cells, UA 0-2   Bacteria, UA NONE SEEN   Casts UA NONE SEEN   Crystals, UA NONE SEEN   Renal Epithelial, UA NONE SEEN   Yeast, Urine NONE SEEN. .. [AL]   1343 Negative. Urine Drug Screen:    AMPHETAMINE+METHAMPHETAMINE URINE SCREEN Negative   Barbiturate Quant, Ur Negative   Benzodiazepine Quant, Ur Negative   Cannabinoid Quant, Ur Negative   Cocaine Metab Quant, Ur Negative   Opiates, Urine Negative   Oxycodone Negative   PCP Quant, Ur Negative [AL]   1343 Pregnancy, Urine: NEGATIVE [AL]   1416 Within normal limits     Basic Metabolic Panel(!):    Sodium 139   Potassium 3.7   Chloride 106   CO2 21(!)   Glucose 103   BUN 8   Creatinine 1.1   Calcium 9.2 [AL]   1416 Acetaminophen Level: < 5.0 [AL]   1417 Est, Glom Filt Rate(!): 59 [AL]   1417 Troponin T: < 0.010 [AL]   1417 Osmolality Ca.1 [AL]   1417 Anion Gap: 12.0 [AL]   1417 Lipase: 46.5 [AL]   1430 \"IMPRESSION:     No acute intrathoracic process. \"   XR CHEST PORTABLE [AL]   0355 \"IMPRESSION:     Normal gallbladder ultrasound without evidence of cholelithiasis. \"   Jamesdelfino Cruzm [AL]   1501 Ammonia: 25 [AL]   1510 Patient was updated on her laboratory results as well as her imaging results. Her pain is no longer present her questions were answered and she will closely with her primary care physician upon disposition. [AL]      ED Course User Index  [AL] Aissatou Sommers MD     Strict return precautions and follow up instructions were discussed with the patient prior to discharge, with which the patient agrees.       MEDICATION CHANGES     New Prescriptions No medications on file         FINAL DISPOSITION     Final diagnoses:   Right upper quadrant abdominal pain     Condition: condition: good  Dispo: Discharge to home      This transcription was electronically signed. Parts of this transcriptions may have been dictated by use of voice recognition software and electronically transcribed, and parts may have been transcribed with the assistance of an ED scribe. The transcription may contain errors not detected in proofreading. Please refer to my supervising physician's documentation if my documentation differs.     Electronically Signed: Gil Hess, 04/11/21, 3:11 PM         Gil Hess MD  Resident  04/11/21 0622

## 2021-04-11 NOTE — ED NOTES
Bed: 029A  Expected date:   Expected time:   Means of arrival: SKYLA GIL II[facility] [employer]  Comments:     Margaret Larios RN  04/11/21 [id number]

## 2021-04-11 NOTE — ED PROVIDER NOTES
[address] [facility]  EMERGENCY MEDICINE ATTENDING ATTESTATION      Evaluation of Ronny Aguilar. Case discussed and care plan developed with resident physician. I agree with the resident physician documentation and plan as documented by him, except if my documentation differs. Patient seen, interviewed and examined by me. I reviewed the medical, surgical, family and social history, medications and allergies. I have reviewed the nursing documentation. I have reviewed the patient's vital signs and are normal per my interpretation. Body mass index is 42.51 kg/m². Pulsoxymetry is normal per my interpretation. Brief H&P   Patient c/o upper quadrant pain epigastric abdominal pain that has been present for several months and has not changed today. Patient decided to come as he was feeling also nausea and pain radiated to the back. Patient states he has been taking Tylenol about 1 g every 1-3 hours for this pain. Physical exam is notable for well appearing obese female, Abdomen is soft, mild tender on right upper quadrant, non-distended, BS positive in 4 quadrants, no HSM, no masses. Medical Decision Making   MDM:   1. Undifferentiated abdominal pain  2. Rule out gallstone disease  Plan:    I V line, labs   Analgesia   Imaging   Observation    Please see the resident physician completed note for final disposition except as documented on this attestation. I have reviewed and interpreted all available lab, radiology and ekg results available at the moment. Diagnosis, treatment and disposition plans were discussed and agreed upon by patient. This transcription was electronically signed. It was dictated by use of voice recognition software and electronically transcribed. The transcription may contain errors not detected in proofreading.      I performed direct supervision and was present for the critical portion following procedures: None  Critical care time on this case: None    Electronically signed by Kai Kothari MD on 4/11/21 at 2:38 PM EDT       Kai Kothari MD  04/11/21 [id number]

## 2021-04-11 NOTE — ED NOTES
Patient presents to ED via EMS for upper abdominal pain and back pain x3 months. States she was told she had an inflamed or enlarged gallbladder approximately 1 month ago. Rates pain 5/10. Denies any emesis or diarrhea. States she is intermittently nauseated. Shows no signs of distress at this time. Skin warm and dry. Respirations even and unlabored.         Amanda Ware RN  04/11/21 [address], RN  04/11/21 1302

## 2021-04-15 ENCOUNTER — HOSPITAL ENCOUNTER (OUTPATIENT)
Dept: NUCLEAR MEDICINE | Age: 26
Discharge: HOME | End: 2021-04-15
Payer: MEDICAID

## 2021-04-15 VITALS — WEIGHT: 240 LBS | BODY MASS INDEX: 42.51 KG/M2

## 2021-04-15 DIAGNOSIS — R11.0 NAUSEA: ICD-10-CM

## 2021-04-15 DIAGNOSIS — R10.11 ABDOMINAL PAIN, RIGHT UPPER QUADRANT: ICD-10-CM

## 2021-04-15 PROCEDURE — 6360000002 HC RX W HCPCS: Performed by: NURSE PRACTITIONER

## 2021-04-15 PROCEDURE — A9537 TC99M MEBROFENIN: HCPCS | Performed by: NURSE PRACTITIONER

## 2021-04-15 PROCEDURE — 2580000003 HC RX 258: Performed by: NURSE PRACTITIONER

## 2021-04-15 PROCEDURE — 78227 HEPATOBIL SYST IMAGE W/DRUG: CPT

## 2021-04-15 PROCEDURE — 3430000000 HC RX DIAGNOSTIC RADIOPHARMACEUTICAL: Performed by: NURSE PRACTITIONER

## 2021-04-15 RX ADMIN — SODIUM CHLORIDE 2.18 MCG: 9 INJECTION, SOLUTION INTRAVENOUS at 11:20

## 2021-04-15 RX ADMIN — Medication 9.9 MILLICURIE: at 10:05

## 2021-04-27 ENCOUNTER — APPOINTMENT (OUTPATIENT)
Dept: CT IMAGING | Age: 26
End: 2021-04-27
Payer: MEDICAID

## 2021-04-27 ENCOUNTER — HOSPITAL ENCOUNTER (EMERGENCY)
Age: 26
Discharge: HOME | End: 2021-04-27
Attending: EMERGENCY MEDICINE
Payer: MEDICAID

## 2021-04-27 VITALS
TEMPERATURE: 98.1 F | RESPIRATION RATE: 18 BRPM | WEIGHT: 245 LBS | DIASTOLIC BLOOD PRESSURE: 67 MMHG | OXYGEN SATURATION: 99 % | BODY MASS INDEX: 43.41 KG/M2 | SYSTOLIC BLOOD PRESSURE: 117 MMHG | HEIGHT: 63 IN | HEART RATE: 75 BPM

## 2021-04-27 DIAGNOSIS — R10.10 UPPER ABDOMINAL PAIN: ICD-10-CM

## 2021-04-27 DIAGNOSIS — R11.2 NON-INTRACTABLE VOMITING WITH NAUSEA, UNSPECIFIED VOMITING TYPE: Primary | ICD-10-CM

## 2021-04-27 LAB
ALBUMIN SERPL-MCNC: 3.9 G/DL (ref 3.5–5.1)
ALP BLD-CCNC: 66 U/L (ref 38–126)
ALT SERPL-CCNC: 14 U/L (ref 11–66)
ANION GAP SERPL CALCULATED.3IONS-SCNC: 13 MEQ/L (ref 8–16)
AST SERPL-CCNC: 12 U/L (ref 5–40)
BASOPHILS # BLD: 0.2 %
BASOPHILS ABSOLUTE: 0 THOU/MM3 (ref 0–0.1)
BILIRUB SERPL-MCNC: < 0.2 MG/DL (ref 0.3–1.2)
BILIRUBIN DIRECT: < 0.2 MG/DL (ref 0–0.3)
BUN BLDV-MCNC: 7 MG/DL (ref 7–22)
CALCIUM SERPL-MCNC: 9.3 MG/DL (ref 8.5–10.5)
CHLORIDE BLD-SCNC: 104 MEQ/L (ref 98–111)
CO2: 21 MEQ/L (ref 23–33)
CREAT SERPL-MCNC: 0.7 MG/DL (ref 0.4–1.2)
EOSINOPHIL # BLD: 1.2 %
EOSINOPHILS ABSOLUTE: 0.2 THOU/MM3 (ref 0–0.4)
ERYTHROCYTE [DISTWIDTH] IN BLOOD BY AUTOMATED COUNT: 12.5 % (ref 11.5–14.5)
ERYTHROCYTE [DISTWIDTH] IN BLOOD BY AUTOMATED COUNT: 41.3 FL (ref 35–45)
GFR SERPL CREATININE-BSD FRML MDRD: > 90 ML/MIN/1.73M2
GLUCOSE BLD-MCNC: 75 MG/DL (ref 70–108)
HCT VFR BLD CALC: 40 % (ref 37–47)
HEMOGLOBIN: 12.9 GM/DL (ref 12–16)
IMMATURE GRANS (ABS): 0.07 THOU/MM3 (ref 0–0.07)
IMMATURE GRANULOCYTES: 0.5 %
LIPASE: 42.3 U/L (ref 5.6–51.3)
LYMPHOCYTES # BLD: 26 %
LYMPHOCYTES ABSOLUTE: 4 THOU/MM3 (ref 1–4.8)
MAGNESIUM: 1.8 MG/DL (ref 1.6–2.4)
MCH RBC QN AUTO: 29.3 PG (ref 26–33)
MCHC RBC AUTO-ENTMCNC: 32.3 GM/DL (ref 32.2–35.5)
MCV RBC AUTO: 90.9 FL (ref 81–99)
MONOCYTES # BLD: 4.9 %
MONOCYTES ABSOLUTE: 0.7 THOU/MM3 (ref 0.4–1.3)
NUCLEATED RED BLOOD CELLS: 0 /100 WBC
OSMOLALITY CALCULATION: 272.3 MOSMOL/KG (ref 275–300)
PLATELET # BLD: 333 THOU/MM3 (ref 130–400)
PMV BLD AUTO: 9.1 FL (ref 9.4–12.4)
POTASSIUM SERPL-SCNC: 3.7 MEQ/L (ref 3.5–5.2)
PREGNANCY, SERUM: NEGATIVE
RBC # BLD: 4.4 MILL/MM3 (ref 4.2–5.4)
SEG NEUTROPHILS: 67.2 %
SEGMENTED NEUTROPHILS ABSOLUTE COUNT: 10.3 THOU/MM3 (ref 1.8–7.7)
SODIUM BLD-SCNC: 138 MEQ/L (ref 135–145)
TOTAL PROTEIN: 7 G/DL (ref 6.1–8)
WBC # BLD: 15.3 THOU/MM3 (ref 4.8–10.8)

## 2021-04-27 PROCEDURE — 84703 CHORIONIC GONADOTROPIN ASSAY: CPT

## 2021-04-27 PROCEDURE — 99284 EMERGENCY DEPT VISIT MOD MDM: CPT

## 2021-04-27 PROCEDURE — 6360000004 HC RX CONTRAST MEDICATION: Performed by: EMERGENCY MEDICINE

## 2021-04-27 PROCEDURE — 83690 ASSAY OF LIPASE: CPT

## 2021-04-27 PROCEDURE — 96375 TX/PRO/DX INJ NEW DRUG ADDON: CPT

## 2021-04-27 PROCEDURE — 36415 COLL VENOUS BLD VENIPUNCTURE: CPT

## 2021-04-27 PROCEDURE — 85025 COMPLETE CBC W/AUTO DIFF WBC: CPT

## 2021-04-27 PROCEDURE — 83735 ASSAY OF MAGNESIUM: CPT

## 2021-04-27 PROCEDURE — 82248 BILIRUBIN DIRECT: CPT

## 2021-04-27 PROCEDURE — 96374 THER/PROPH/DIAG INJ IV PUSH: CPT

## 2021-04-27 PROCEDURE — 80053 COMPREHEN METABOLIC PANEL: CPT

## 2021-04-27 PROCEDURE — 2580000003 HC RX 258: Performed by: EMERGENCY MEDICINE

## 2021-04-27 PROCEDURE — 74177 CT ABD & PELVIS W/CONTRAST: CPT

## 2021-04-27 PROCEDURE — 6360000002 HC RX W HCPCS: Performed by: EMERGENCY MEDICINE

## 2021-04-27 RX ORDER — ONDANSETRON 2 MG/ML
4 INJECTION INTRAMUSCULAR; INTRAVENOUS ONCE
Status: COMPLETED | OUTPATIENT
Start: 2021-04-27 | End: 2021-04-27

## 2021-04-27 RX ORDER — KETOROLAC TROMETHAMINE 30 MG/ML
15 INJECTION, SOLUTION INTRAMUSCULAR; INTRAVENOUS ONCE
Status: COMPLETED | OUTPATIENT
Start: 2021-04-27 | End: 2021-04-27

## 2021-04-27 RX ORDER — SODIUM CHLORIDE, SODIUM LACTATE, POTASSIUM CHLORIDE, CALCIUM CHLORIDE 600; 310; 30; 20 MG/100ML; MG/100ML; MG/100ML; MG/100ML
1000 INJECTION, SOLUTION INTRAVENOUS ONCE
Status: COMPLETED | OUTPATIENT
Start: 2021-04-27 | End: 2021-04-27

## 2021-04-27 RX ORDER — ONDANSETRON 4 MG/1
4 TABLET, ORALLY DISINTEGRATING ORAL EVERY 8 HOURS PRN
Qty: 12 TABLET | Refills: 0 | Status: SHIPPED | OUTPATIENT
Start: 2021-04-27 | End: 2021-07-13

## 2021-04-27 RX ADMIN — IOPAMIDOL 80 ML: 755 INJECTION, SOLUTION INTRAVENOUS at 17:49

## 2021-04-27 RX ADMIN — ONDANSETRON 4 MG: 2 INJECTION INTRAMUSCULAR; INTRAVENOUS at 16:45

## 2021-04-27 RX ADMIN — KETOROLAC TROMETHAMINE 15 MG: 30 INJECTION, SOLUTION INTRAMUSCULAR at 16:46

## 2021-04-27 RX ADMIN — SODIUM CHLORIDE, POTASSIUM CHLORIDE, SODIUM LACTATE AND CALCIUM CHLORIDE 1000 ML: 600; 310; 30; 20 INJECTION, SOLUTION INTRAVENOUS at 16:45

## 2021-04-27 ASSESSMENT — ENCOUNTER SYMPTOMS
NAUSEA: 1
EYE REDNESS: 0
DIARRHEA: 0
VOMITING: 1
BACK PAIN: 0
CONSTIPATION: 0
SHORTNESS OF BREATH: 0
CHEST TIGHTNESS: 0
WHEEZING: 0
RHINORRHEA: 0
SORE THROAT: 0
COUGH: 0
ABDOMINAL PAIN: 1

## 2021-04-27 ASSESSMENT — PAIN DESCRIPTION - DESCRIPTORS: DESCRIPTORS: STABBING

## 2021-04-27 ASSESSMENT — PAIN SCALES - GENERAL
PAINLEVEL_OUTOF10: 7
PAINLEVEL_OUTOF10: 7

## 2021-04-27 ASSESSMENT — PAIN DESCRIPTION - LOCATION: LOCATION: ABDOMEN

## 2021-04-27 ASSESSMENT — PAIN DESCRIPTION - FREQUENCY: FREQUENCY: CONTINUOUS

## 2021-04-27 NOTE — ED PROVIDER NOTES
[place] ENCOUNTER          Pt Name: Emmy Brown  MRN: [id number]  [place] 1995  Date of evaluation: 4/27/2021  Emergency Physician: Sydnee Lema, [facility]       Chief Complaint   Patient presents with    Abdominal Pain     right upper    Emesis     History obtained from the patient. HISTORY OF PRESENT ILLNESS    HPI  Emmy Brown is a 27 y.o. female who presents to the emergency department for evaluation of abdominal pain. Patient with bilateral upper abdominal pain for the last 3 months. Pain waxes and wanes. She states it is worse with food. States associated with nausea. She follows with GI. She had a negative endoscopy. She had a recent gallbladder ultrasound without cholelithiasis or cholecystitis. She had a negative HIDA scan. She is scheduled to follow-up with general surgery in 2 days. The patient has no other acute complaints at this time. REVIEW OF SYSTEMS   Review of Systems   Constitutional: Negative for chills and fever. HENT: Negative for congestion, rhinorrhea and sore throat. Eyes: Negative for redness and visual disturbance. Respiratory: Negative for cough, chest tightness, shortness of breath and wheezing. Cardiovascular: Negative for chest pain and leg swelling. Gastrointestinal: Positive for abdominal pain, nausea and vomiting. Negative for constipation and diarrhea. Endocrine: Negative for polydipsia and polyuria. Genitourinary: Negative for decreased urine volume, dysuria and urgency. Musculoskeletal: Negative for back pain and myalgias. Skin: Negative for rash and wound. Neurological: Negative for light-headedness and headaches. Hematological: Does not bruise/bleed easily. Psychiatric/Behavioral: Negative for decreased concentration and dysphoric mood.          PAST MEDICAL AND SURGICAL HISTORY     Past Medical History:   Diagnosis Date    Arthritis  Bipolar 1 disorder (HCC)     Fibromyalgia     Schizophrenia ([facility] [address]     Stomach pain      Past Surgical History:   Procedure Laterality Date    COLONOSCOPY      EGD      WISDOM TOOTH EXTRACTION           MEDICATIONS   No current facility-administered medications for this encounter. Current Outpatient Medications:     MARCELLO 3-0.02 MG per tablet, , Disp: , Rfl:     traZODone (DESYREL) 100 MG tablet, , Disp: , Rfl:     FANAPT 4 MG TABS tablet, , Disp: , Rfl:     busPIRone (BUSPAR) 5 MG tablet, , Disp: , Rfl:     pantoprazole (PROTONIX) 40 MG tablet, Take 1 tablet by mouth every morning (before breakfast), Disp: 30 tablet, Rfl: 6    clonazePAM (KLONOPIN) 0.5 MG tablet, Take 0.5 mg by mouth 2 times daily as needed. , Disp: , Rfl:     VORTIoxetine (TRINTELLIX) 5 MG tablet, Take 10 mg by mouth daily, Disp: , Rfl:     paliperidone palmitate ER (INVEGA SUSTENNA) 234 MG/1.5ML FABIANA IM injection, Inject into the muscle every 21 days, Disp: , Rfl:     trihexyphenidyl (ARTANE) 5 MG tablet, Take 5 mg by mouth 2 times daily , Disp: , Rfl:     albuterol sulfate HFA (PROVENTIL HFA) 108 (90 Base) MCG/ACT inhaler, Inhale 2 puffs into the lungs every 4 hours as needed for Wheezing or Shortness of Breath (Space out to every 6 hours as symptoms improve) Space out to every 6 hours as symptoms improve., Disp: 1 Inhaler, Rfl: 0    metFORMIN (GLUCOPHAGE) 500 MG tablet, Take 500 mg by mouth 2 times daily (with meals), Disp: , Rfl:       SOCIAL HISTORY     Social History     Social History Narrative    Not on file     Social History     Tobacco Use    Smoking status: Current Every Day Smoker     Packs/day: 2.00     Types: Cigarettes    Smokeless tobacco: Never Used   Substance Use Topics    Alcohol use: Not Currently    Drug use: No         ALLERGIES   No Known Allergies      FAMILY HISTORY     Family History   Problem Relation Age of Onset    Colon Cancer Neg Hx     Colon Polyps Neg Hx     Esophageal Cancer Neg Hx PHYSICAL EXAM     ED Triage Vitals [04/27/21 1516]   BP Temp Temp Source Pulse Resp SpO2 Height Weight   131/84 98.1 °F (36.7 °C) Oral 113 14 98 % 5' 3\" (1.6 m) 245 lb (111.1 kg)         Additional Vital Signs:  Vitals:    04/27/21 1734   BP: 117/67   Pulse: 75   Resp: 18   Temp:    SpO2: 99%       Physical Exam  Constitutional:       General: She is not in acute distress. Appearance: She is well-developed. She is not diaphoretic. HENT:      Head: Normocephalic and atraumatic. Eyes:      General:         Right eye: No discharge. Left eye: No discharge. Conjunctiva/sclera: Conjunctivae normal.      Pupils: Pupils are equal, round, and reactive to light. Neck:      Musculoskeletal: Normal range of motion and neck supple. Thyroid: No thyromegaly. Vascular: No JVD. Cardiovascular:      Rate and Rhythm: Normal rate and regular rhythm. Heart sounds: Normal heart sounds. Pulmonary:      Effort: Pulmonary effort is normal. No respiratory distress. Breath sounds: Normal breath sounds. Abdominal:      General: Bowel sounds are normal. There is no distension. Palpations: Abdomen is soft. Tenderness: There is abdominal tenderness in the right upper quadrant and left upper quadrant. There is no guarding or rebound. Negative signs include Christie's sign. Musculoskeletal: Normal range of motion. General: No tenderness. Lymphadenopathy:      Cervical: No cervical adenopathy. Skin:     General: Skin is warm and dry. Capillary Refill: Capillary refill takes less than 2 seconds. Findings: No rash. Neurological:      Mental Status: She is alert and oriented to person, place, and time. She is not disoriented. GCS: GCS eye subscore is 4. GCS verbal subscore is 5. GCS motor subscore is 6. Motor: No abnormal muscle tone or seizure activity. Gait: Gait normal.      Comments: Awake and alert. Moves all extremities.  Non-focal exam with steady gait. Initial vital signs and nursing assessment reviewed and normal.   Pulsoximetry is normal per my interpretation. MEDICAL DECISION MAKING   Initial Assessment: Given the patient's above chief complaint and findings on history and physical examination, I thought it was appropriate to consider the following emergency medical conditions: Chronic abdominal pain, enteritis, peptic ulcer disease, gastritis, dyspepsia, acute or chronic cholecystitis, although some of these diagnoses are unlikely they were considered in my medical decision making. Plan: CBC, BMP, ECG, lipase, LFTs, CT abdomen pelvis symptomatic treatment with IV hydration Zofran, Toradol and reassess         ED RESULTS   Laboratory results:  Labs Reviewed   CBC WITH AUTO DIFFERENTIAL - Abnormal; Notable for the following components:       Result Value    WBC 15.3 (*)     MPV 9.1 (*)     Segs Absolute 10.3 (*)     All other components within normal limits   BASIC METABOLIC PANEL - Abnormal; Notable for the following components:    CO2 21 (*)     All other components within normal limits   HEPATIC FUNCTION PANEL - Abnormal; Notable for the following components: Total Bilirubin <0.2 (*)     All other components within normal limits   OSMOLALITY - Abnormal; Notable for the following components:    Osmolality Calc 272.3 (*)     All other components within normal limits   COVID-19, RAPID   LIPASE   MAGNESIUM   ANION GAP   GLOMERULAR FILTRATION RATE, ESTIMATED   HCG, SERUM, QUALITATIVE   COVID-19       Radiologic studies results:  CT ABDOMEN PELVIS W IV CONTRAST Additional Contrast? None   Final Result   No acute abdominal or pelvic abnormalities. **This report has been created using voice recognition software. It may contain minor errors which are inherent in voice recognition technology. **      Final report electronically signed by Dr. Jorge Brooks on 4/27/2021 6:05 PM          ED Medications administered this visit: Medications   lactated ringers infusion 1,000 mL (1,000 mLs Intravenous New Bag 4/27/21 1645)   ketorolac (TORADOL) injection 15 mg (15 mg Intravenous Given 4/27/21 1646)   ondansetron (ZOFRAN) injection 4 mg (4 mg Intravenous Given 4/27/21 1645)   iopamidol (ISOVUE-370) 76 % injection 80 mL (80 mLs Intravenous Given 4/27/21 1749)         ED COURSE     ED Course as of Apr 27 1823   Tue Apr 27, 2021 1820 Trending down likely related to hydration status. WBC(!): 15.3 [DD]   1820 No acute pathology abdomen pelvis. CT ABDOMEN PELVIS W IV CONTRAST Additional Contrast? None [DD]   1821 CO2(!): 21 [DD]      ED Course User Index  [DD] Louie Orantes,      The patient presents with abdominal pain. The patient is feeling better with a benign repeat examination. There is no evidence of an acute abdomen at this time. Based on history, physical exam, risk factors, and tests,  my suspicion for bowel obstruction, acute pancreatitis, abscess, perforated viscous, diverticulitis, cholecystis, appendicitis is very low and I feel the patient can be managed as an outpatient with follow up. I also see no evidence of aortic dissection, AAA, or Acute Coronary Syndrome. Instructions have been given for the patient to return to the ED for worsening of the pain, high fevers, intractable vomiting, or bleeding. Strict follow up and return precautions have been discussed. MEDICATION CHANGES     DISCHARGE MEDICATIONS:  New Prescriptions    ONDANSETRON (ZOFRAN ODT) 4 MG DISINTEGRATING TABLET    Take 1 tablet by mouth every 8 hours as needed for Nausea            FINAL DISPOSITION     Final diagnoses:   Non-intractable vomiting with nausea, unspecified vomiting type   Upper abdominal pain     Condition: condition: good  Dispo: Discharge to home    PATIENT REFERRED TO:  Jenna Schwarz MD  [address]  [phone]    Go to   [facility] as scheduled.     Daryle Mai, APRN - CNP  [address] [place] [address]  [phone]    Go in 2 days        Critical Care Time   None      This transcription was electronically signed. Parts of this transcriptions may have been dictated by use of voice recognition software and electronically transcribed, and parts may have been transcribed with the assistance of an ED scribe. The transcription may contain errors not detected in proofreading.     Electronically Signed: Jaelyn Garcia, 04/27/21, [address] [facility]      Jaelyn Garcia DO  04/27/21 7824

## 2021-04-28 ENCOUNTER — CARE COORDINATION (OUTPATIENT)
Dept: CARE COORDINATION | Age: 26
End: 2021-04-28

## 2021-04-28 NOTE — CARE COORDINATION
Patient contacted regarding recent discharge and COVID-19 risk. Discussed COVID-19 related testing which was not done at this time. Test results were not done. Patient informed of results, if available? No     Care Transition Nurse/ Ambulatory Care Manager contacted the patient by telephone to perform post discharge assessment. Verified name and  with patient as identifiers. Patient has following risk factors of: no known risk factors. CTN/ACM reviewed discharge instructions, medical action plan and red flags related to discharge diagnosis. Reviewed and educated them on any new and changed medications related to discharge diagnosis. Advised obtaining a 90-day supply of all daily and as-needed medications. Education provided regarding infection prevention, and signs and symptoms of COVID-19 and when to seek medical attention with patient who verbalized understanding. Discussed exposure protocols and quarantine from [address] you at higher risk for severe illness  and given an opportunity for questions and concerns. The patient agrees to contact the COVID-19 hotline [phone] or PCP office for questions related to their healthcare. CTN/ACM provided contact information for future reference. From CDC: Are you at higher risk for severe illness?  Wash your hands often.  Avoid close contact (6 feet, which is about two arm lengths) with people who are sick.  Put distance between yourself and other people if COVID-19 is spreading in your community.  Clean and disinfect frequently touched surfaces.  Avoid all cruise travel and non-essential air travel.  Call your healthcare professional if you have concerns about COVID-19 and your underlying condition or if you are sick. For more information on steps you can take to protect yourself, see CDC's How to Protect Yourself    Pt will be further monitored by COVID Loop Team based on severity of symptoms and risk factors.  Not tested

## 2021-04-29 ENCOUNTER — HOSPITAL ENCOUNTER (OUTPATIENT)
Age: 26
Discharge: HOME | End: 2021-04-29
Payer: MEDICAID

## 2021-04-29 ENCOUNTER — OFFICE VISIT (OUTPATIENT)
Dept: SURGERY | Age: 26
End: 2021-04-29
Payer: MEDICAID

## 2021-04-29 VITALS
HEIGHT: 63 IN | WEIGHT: 245 LBS | DIASTOLIC BLOOD PRESSURE: 62 MMHG | RESPIRATION RATE: 15 BRPM | TEMPERATURE: 97.8 F | SYSTOLIC BLOOD PRESSURE: 122 MMHG | HEART RATE: 100 BPM | BODY MASS INDEX: 43.41 KG/M2 | OXYGEN SATURATION: 98 %

## 2021-04-29 DIAGNOSIS — R10.11 RUQ ABDOMINAL PAIN: ICD-10-CM

## 2021-04-29 DIAGNOSIS — R11.2 NAUSEA AND VOMITING, INTRACTABILITY OF VOMITING NOT SPECIFIED, UNSPECIFIED VOMITING TYPE: ICD-10-CM

## 2021-04-29 DIAGNOSIS — F31.9 BIPOLAR 1 DISORDER (HCC): ICD-10-CM

## 2021-04-29 DIAGNOSIS — E66.01 MORBID OBESITY (HCC): ICD-10-CM

## 2021-04-29 DIAGNOSIS — F20.3 UNDIFFERENTIATED SCHIZOPHRENIA (HCC): ICD-10-CM

## 2021-04-29 DIAGNOSIS — R10.11 RUQ ABDOMINAL PAIN: Primary | ICD-10-CM

## 2021-04-29 DIAGNOSIS — Z01.818 PRE-OP TESTING: ICD-10-CM

## 2021-04-29 LAB
AMORPHOUS: NORMAL
BACTERIA: NORMAL
BILIRUBIN URINE: NEGATIVE
BLOOD, URINE: NEGATIVE
CASTS: NORMAL /LPF
CHARACTER, URINE: CLEAR
COLOR: YELLOW
CRYSTALS: NORMAL
EPITHELIAL CELLS, UA: NORMAL /HPF
GLUCOSE, URINE: NEGATIVE MG/DL
KETONES, URINE: NEGATIVE
LEUKOCYTE ESTERASE, URINE: NEGATIVE
MUCUS: NORMAL
NITRITE, URINE: NEGATIVE
PH UA: 6.5 (ref 5–9)
PROTEIN UA: NEGATIVE MG/DL
RBC URINE: NORMAL /HPF
RENAL EPITHELIAL, UA: NORMAL
SPECIFIC GRAVITY UA: 1.01 (ref 1–1.03)
UROBILINOGEN, URINE: 0.2 EU/DL (ref 0–1)
WBC UA: NORMAL /HPF
YEAST: NORMAL

## 2021-04-29 PROCEDURE — U0005 INFEC AGEN DETEC AMPLI PROBE: HCPCS

## 2021-04-29 PROCEDURE — 81001 URINALYSIS AUTO W/SCOPE: CPT

## 2021-04-29 PROCEDURE — U0003 INFECTIOUS AGENT DETECTION BY NUCLEIC ACID (DNA OR RNA); SEVERE ACUTE RESPIRATORY SYNDROME CORONAVIRUS 2 (SARS-COV-2) (CORONAVIRUS DISEASE [COVID-19]), AMPLIFIED PROBE TECHNIQUE, MAKING USE OF HIGH THROUGHPUT TECHNOLOGIES AS DESCRIBED BY CMS-2020-01-R: HCPCS

## 2021-04-29 PROCEDURE — G8427 DOCREV CUR MEDS BY ELIG CLIN: HCPCS | Performed by: SURGERY

## 2021-04-29 PROCEDURE — 4004F PT TOBACCO SCREEN RCVD TLK: CPT | Performed by: SURGERY

## 2021-04-29 PROCEDURE — 99204 OFFICE O/P NEW MOD 45 MIN: CPT | Performed by: SURGERY

## 2021-04-29 PROCEDURE — G8417 CALC BMI ABV UP PARAM F/U: HCPCS | Performed by: SURGERY

## 2021-04-29 RX ORDER — CARBAMAZEPINE 200 MG/1
200 TABLET ORAL 3 TIMES DAILY
COMMUNITY
End: 2021-09-10

## 2021-04-29 NOTE — PROGRESS NOTES
states it is even hard to drink water. Patient does have a history of schizophrenia and lives and what sounds like a group home situation. She reports she, however, does not make her own decisions. She was referred here by Mark Comse. She had a recent HIDA scan that showed an ejection fraction of 100%. Patient reports symptoms are related to eating. Her symptoms are not classic for biliary colic. EGD and colonoscopy performed in February of this year by Dr. Umesh Glover. She had mild gastritis and a normal colonoscopy. Random biopsies showed no histopathologic abnormality. Past Medical History  Past Medical History:   Diagnosis Date    Arthritis     Bipolar 1 disorder ([facility] [address]     Fibromyalgia     Pre-diabetes     Psychosis ([facility] [address]     Schizophrenia ([facility] [address]     Stomach pain        Past Surgical History  Past Surgical History:   Procedure Laterality Date    COLONOSCOPY  02/2021    Dr. Gerhardt Booker EGD  02/2021    Gerhardt Booker WISDOM TOOTH EXTRACTION         Medications  Current Outpatient Medications   Medication Sig Dispense Refill    carBAMazepine (TEGRETOL) 200 MG tablet Take 200 mg by mouth 3 times daily      ondansetron (ZOFRAN ODT) 4 MG disintegrating tablet Take 1 tablet by mouth every 8 hours as needed for Nausea 12 tablet 0    MARCELLO 3-0.02 MG per tablet       traZODone (DESYREL) 100 MG tablet       FANAPT 4 MG TABS tablet       busPIRone (BUSPAR) 5 MG tablet       pantoprazole (PROTONIX) 40 MG tablet Take 1 tablet by mouth every morning (before breakfast) 30 tablet 6    clonazePAM (KLONOPIN) 0.5 MG tablet Take 0.5 mg by mouth 2 times daily as needed.       VORTIoxetine (TRINTELLIX) 5 MG tablet Take 10 mg by mouth daily      paliperidone palmitate ER (INVEGA SUSTENNA) 234 MG/1.5ML FABIANA IM injection Inject into the muscle every 21 days      trihexyphenidyl (ARTANE) 5 MG tablet Take 5 mg by mouth 2 times daily       albuterol sulfate HFA (PROVENTIL HFA) 108 (90 Base) MCG/ACT inhaler Inhale 2 puffs into the lungs every 4 hours as needed for Wheezing or Shortness of Breath (Space out to every 6 hours as symptoms improve) Space out to every 6 hours as symptoms improve. 1 Inhaler 0    metFORMIN (GLUCOPHAGE) 500 MG tablet Take 500 mg by mouth 2 times daily (with meals)       No current facility-administered medications for this visit.       Allergies   No Known Allergies    Family History  Family History   Problem Relation Age of Onset    Colon Cancer Neg Hx     Colon Polyps Neg Hx     Esophageal Cancer Neg Hx        SocialHistory  Social History     Socioeconomic History    Marital status: Single     Spouse name: Not on file    Number of children: Not on file    Years of education: Not on file    Highest education level: Not on file   Occupational History    Not on file   Social Needs    Financial resource strain: Not hard at all   [employer] insecurity     Worry: Never true     Inability: Never true   [email or web address] needs     Medical: No     Non-medical: No   Tobacco Use    Smoking status: Current Every Day Smoker     Packs/day: 2.00     Types: Cigarettes    Smokeless tobacco: Never Used   Substance and Sexual Activity    Alcohol use: Not Currently    Drug use: No    Sexual activity: Not Currently     Partners: Male   Lifestyle    Physical activity     Days per week: Not on file     Minutes per session: Not on file    Stress: Not on file   Relationships    Social connections     Talks on phone: Not on file     Gets together: Not on file     Attends Jew service: Not on file     Active member of club or organization: Not on file     Attends meetings of clubs or organizations: Not on file     Relationship status: Not on file    Intimate partner violence     Fear of current or ex partner: Not on file     Emotionally abused: Not on file     Physically abused: Not on file     Forced sexual activity: Not on file   Other Topics Concern    Not on file   Social History Narrative    Not on file Review of Systems  Review of Systems   Constitutional: Positive for appetite change and fatigue. Negative for chills, fever and unexpected weight change. HENT: Positive for drooling, ear pain, facial swelling and trouble swallowing. Negative for sore throat and voice change. Eyes: Positive for pain. Negative for visual disturbance. Respiratory: Positive for cough, chest tightness and shortness of breath. Negative for wheezing. Cardiovascular: Positive for chest pain. Negative for palpitations. Gastrointestinal: Positive for abdominal pain, constipation, nausea and vomiting. Negative for blood in stool. Endocrine: Negative for cold intolerance, heat intolerance and polydipsia. Genitourinary: Positive for difficulty urinating and flank pain. Negative for dysuria and hematuria. Musculoskeletal: Negative for gait problem, joint swelling and myalgias. Skin: Negative for color change and rash. Allergic/Immunologic: Negative for immunocompromised state. Neurological: Positive for dizziness, speech difficulty, weakness and light-headedness. Negative for tremors and seizures. Hematological: Does not bruise/bleed easily. Psychiatric/Behavioral: Positive for agitation, confusion and sleep disturbance. Negative for behavioral problems and suicidal ideas. The patient is nervous/anxious. OBJECTIVE     /62 (Site: Right Lower Arm, Position: Sitting, Cuff Size: Medium Adult)   Pulse 100   Temp 97.8 °F (36.6 °C) (Tympanic)   Resp 15   Ht 5' 3\" (1.6 m)   Wt 245 lb (111.1 kg)   LMP 12/01/2020   SpO2 98%   BMI 43.40 kg/m²      Physical Exam  Vitals signs reviewed. Constitutional:       Appearance: She is well-developed. She is obese. She is not ill-appearing or diaphoretic. HENT:      Head: Normocephalic and atraumatic. Nose: No congestion. Eyes:      General: No scleral icterus. Extraocular Movements: Extraocular movements intact.       Pupils: Pupils are equal, round, and reactive to light. Neck:      Musculoskeletal: Normal range of motion and neck supple. Thyroid: No thyromegaly. Vascular: No JVD. Trachea: No tracheal deviation. Cardiovascular:      Rate and Rhythm: Normal rate and regular rhythm. Heart sounds: Normal heart sounds. No murmur. Pulmonary:      Effort: Pulmonary effort is normal. No respiratory distress. Breath sounds: Normal breath sounds. No wheezing. Abdominal:      General: Bowel sounds are normal. There is no distension. Palpations: Abdomen is soft. There is no mass. Tenderness: There is no abdominal tenderness. There is no guarding or rebound. Hernia: No hernia is present. Musculoskeletal: Normal range of motion. Right lower leg: No edema. Left lower leg: No edema. Lymphadenopathy:      Cervical: No cervical adenopathy. Skin:     General: Skin is warm and dry. Coloration: Skin is not jaundiced or pale. Findings: No bruising or rash. Neurological:      General: No focal deficit present. Mental Status: She is alert and oriented to person, place, and time. Cranial Nerves: No cranial nerve deficit. Psychiatric:      Comments: Affect on the flatter side. No agitation or confusion. Lab Results   Component Value Date    WBC 15.3 (H) 04/27/2021    HGB 12.9 04/27/2021    HCT 40.0 04/27/2021     04/27/2021    ALT 14 04/27/2021    AST 12 04/27/2021     04/27/2021    K 3.7 04/27/2021     04/27/2021    CREATININE 0.7 04/27/2021    BUN 7 04/27/2021    CO2 21 (L) 04/27/2021    TSH 0.993 09/18/2018          PROCEDURE: CT ABDOMEN PELVIS W IV CONTRAST       CLINICAL INFORMATION: abdominal pain .       COMPARISON: No prior study.       TECHNIQUE: Approximately 2-D multiplanar postcontrast images of the abdomen and pelvis.  Isovue-370 IV contrast only   All CT scans at this facility use dose modulation, iterative reconstruction, and/or weight-based dosing when appropriate to reduce radiation dose to as low as reasonably achievable.       FINDINGS:    Lung bases   Lung bases are clear undersurface of the heart unremarkable. Abdomen and pelvis   Liver, and spleen are normal. Pancreas normal.   Gallbladder is unremarkable. Adrenals are normal.   Kidneys enhance symmetrically and appear normal.   Aorta is unremarkable. No adenopathy. Pelvis   Normal appendix. No bowel obstruction. Left ovary is identified uterus is unremarkable. Urinary bladder is mildly distended. No abnormal fluid collections. No pelvic or inguinal lymphadenopathy.           Impression   No acute abdominal or pelvic abnormalities.             **This report has been created using voice recognition software.  It may contain minor errors which are inherent in voice recognition technology. **       Final report electronically signed by Dr. Nicolás Rhodes on 4/27/2021 6:05 PM            PROCEDURE: NM HEPATOBILIARY SCAN W PHARMACOLOGICAL INTERVENTION       CLINICAL INFORMATION: Right upper quadrant abdominal pain       Radiopharmaceutical: 9.9 mCi technetium 99m mebrofenin, intravenously.       TECHNIQUE: Anterior images of the abdomen were obtained for 60 minutes following radiopharmaceutical administration. 2.18 mcg Kinevac were infused over 60 minutes while additional left anterior oblique images were obtained. Subsequently, a region of    interest was drawn around the gallbladder and ejection fraction was calculated.       COMPARISON: None       FINDINGS: There is normal hepatic uptake of radiotracer. Activity is present in the gallbladder, common bile duct and small bowel by 17 minutes.       Following Kinevac administration, the calculated gallbladder ejection fraction is 100% (normal is 35% or greater).         Impression   1. Patent cystic and common bile ducts without evidence of acute cholecystitis.    2. Normal gallbladder ejection fraction.       Final report electronically signed by

## 2021-04-29 NOTE — LETTER
[address] [facility]  [address]  Phone: [phone]  Fax: [phone]    Henriette Bence, MD        April 30, 2021    Mary Danny, APRN [facility] [address]    Patient: Guevara Fuller   MR Number: [id number]   YOB: 1995   Date of Visit: 4/29/2021     Dear Steve Lucia,    Thank you for the request for consultation for Guevara Fuller to me for the evaluation of abdominal pain and a hyperactive gallbladder. Below are the relevant portions of my assessment and plan of care. 1. RUQ abdominal pain    2. Nausea and vomiting, intractability of vomiting not specified, unspecified vomiting type    3. Pre-op testing    4. Bipolar 1 disorder ([address]    5. Undifferentiated schizophrenia ([address]    6. Morbid obesity ([address]    4. Hyperactive gallbladder    1. Patient has had extensive GI evaluation. HIDA scan showed an overactive gallbladder with an ejection fraction of 100%. Patient has multiple complaints. She has been extensively counseled by GI as well as myself that removal of the gallbladder likely will not alleviate all of her symptoms. She has an at least 50% chance it will not help any of her symptoms. 2.  Patient does wish to proceed with cholecystectomy after extensive counseling. She is aware of all risks. Surgical risks include but not limited to bleeding, infection, bile duct leak, postoperative diarrhea, organ injury, bile duct injury as well as persistence of symptoms. She expressed understanding wishes to proceed. 3.  Schedule patient for robotic assisted laparoscopic cholecystectomy. 4.  Preoperative testing per anesthesia guidelines including COVID-19 screen. 5.  General anesthesia        If you have questions, please do not hesitate to call me. I look forward to following Dario Starks along with you.     Sincerely,          Henriette Bence, MD

## 2021-04-30 LAB
SARS-COV-2: NOT DETECTED
SOURCE: NORMAL

## 2021-04-30 ASSESSMENT — ENCOUNTER SYMPTOMS
COLOR CHANGE: 0
CONSTIPATION: 1
COUGH: 1
EYE PAIN: 1
VOICE CHANGE: 0
CHEST TIGHTNESS: 1
NAUSEA: 1
FACIAL SWELLING: 1
BLOOD IN STOOL: 0
ABDOMINAL PAIN: 1
VOMITING: 1
SHORTNESS OF BREATH: 1
TROUBLE SWALLOWING: 1
SORE THROAT: 0
WHEEZING: 0

## 2021-05-04 NOTE — PROGRESS NOTES
Patient contacted regarding COVID-19 screen.   Called unable to leave voicemail Quality [facility]: Preventive Care And Screening: Influenza Immunization: Influenza Immunization Administered during Influenza season Detail Level: Detailed

## 2021-05-05 ENCOUNTER — ANESTHESIA (OUTPATIENT)
Dept: OPERATING ROOM | Age: 26
End: 2021-05-05
Payer: MEDICAID

## 2021-05-05 ENCOUNTER — ANESTHESIA EVENT (OUTPATIENT)
Dept: OPERATING ROOM | Age: 26
End: 2021-05-05
Payer: MEDICAID

## 2021-05-05 ENCOUNTER — HOSPITAL ENCOUNTER (OUTPATIENT)
Age: 26
Setting detail: OUTPATIENT SURGERY
Discharge: HOME | End: 2021-05-05
Attending: SURGERY | Admitting: SURGERY
Payer: MEDICAID

## 2021-05-05 VITALS
SYSTOLIC BLOOD PRESSURE: 104 MMHG | BODY MASS INDEX: 43.59 KG/M2 | RESPIRATION RATE: 18 BRPM | HEIGHT: 63 IN | WEIGHT: 246 LBS | OXYGEN SATURATION: 96 % | DIASTOLIC BLOOD PRESSURE: 69 MMHG | TEMPERATURE: 97 F | HEART RATE: 91 BPM

## 2021-05-05 VITALS — OXYGEN SATURATION: 87 % | SYSTOLIC BLOOD PRESSURE: 109 MMHG | DIASTOLIC BLOOD PRESSURE: 56 MMHG

## 2021-05-05 DIAGNOSIS — K80.50 BILIARY COLIC: Primary | ICD-10-CM

## 2021-05-05 LAB
GLUCOSE BLD-MCNC: 82 MG/DL (ref 70–108)
PREGNANCY, URINE: NEGATIVE

## 2021-05-05 PROCEDURE — 3700000001 HC ADD 15 MINUTES (ANESTHESIA): Performed by: SURGERY

## 2021-05-05 PROCEDURE — 6370000000 HC RX 637 (ALT 250 FOR IP)

## 2021-05-05 PROCEDURE — 7100000011 HC PHASE II RECOVERY - ADDTL 15 MIN: Performed by: SURGERY

## 2021-05-05 PROCEDURE — 2709999900 HC NON-CHARGEABLE SUPPLY: Performed by: SURGERY

## 2021-05-05 PROCEDURE — 6360000002 HC RX W HCPCS: Performed by: ANESTHESIOLOGY

## 2021-05-05 PROCEDURE — 2500000003 HC RX 250 WO HCPCS: Performed by: ANESTHESIOLOGY

## 2021-05-05 PROCEDURE — 3600000009 HC SURGERY ROBOT BASE: Performed by: SURGERY

## 2021-05-05 PROCEDURE — 3700000000 HC ANESTHESIA ATTENDED CARE: Performed by: SURGERY

## 2021-05-05 PROCEDURE — 47562 LAPAROSCOPIC CHOLECYSTECTOMY: CPT | Performed by: SURGERY

## 2021-05-05 PROCEDURE — S2900 ROBOTIC SURGICAL SYSTEM: HCPCS | Performed by: SURGERY

## 2021-05-05 PROCEDURE — 82948 REAGENT STRIP/BLOOD GLUCOSE: CPT

## 2021-05-05 PROCEDURE — 7100000010 HC PHASE II RECOVERY - FIRST 15 MIN: Performed by: SURGERY

## 2021-05-05 PROCEDURE — 2500000003 HC RX 250 WO HCPCS: Performed by: SURGERY

## 2021-05-05 PROCEDURE — 2580000003 HC RX 258: Performed by: ANESTHESIOLOGY

## 2021-05-05 PROCEDURE — 88304 TISSUE EXAM BY PATHOLOGIST: CPT

## 2021-05-05 PROCEDURE — 3600000019 HC SURGERY ROBOT ADDTL 15MIN: Performed by: SURGERY

## 2021-05-05 PROCEDURE — 7100000000 HC PACU RECOVERY - FIRST 15 MIN: Performed by: SURGERY

## 2021-05-05 PROCEDURE — 2580000003 HC RX 258: Performed by: SURGERY

## 2021-05-05 PROCEDURE — 81025 URINE PREGNANCY TEST: CPT

## 2021-05-05 PROCEDURE — 7100000001 HC PACU RECOVERY - ADDTL 15 MIN: Performed by: SURGERY

## 2021-05-05 RX ORDER — SODIUM CHLORIDE 9 MG/ML
INJECTION, SOLUTION INTRAVENOUS CONTINUOUS PRN
Status: DISCONTINUED | OUTPATIENT
Start: 2021-05-05 | End: 2021-05-05 | Stop reason: SDUPTHER

## 2021-05-05 RX ORDER — FENTANYL CITRATE 50 UG/ML
25 INJECTION, SOLUTION INTRAMUSCULAR; INTRAVENOUS EVERY 5 MIN PRN
Status: DISCONTINUED | OUTPATIENT
Start: 2021-05-05 | End: 2021-05-05 | Stop reason: HOSPADM

## 2021-05-05 RX ORDER — HYDROCODONE BITARTRATE AND ACETAMINOPHEN 5; 325 MG/1; MG/1
1 TABLET ORAL EVERY 4 HOURS PRN
Qty: 18 TABLET | Refills: 0 | Status: SHIPPED | OUTPATIENT
Start: 2021-05-05 | End: 2021-05-08

## 2021-05-05 RX ORDER — GLYCOPYRROLATE 1 MG/5 ML
SYRINGE (ML) INTRAVENOUS PRN
Status: DISCONTINUED | OUTPATIENT
Start: 2021-05-05 | End: 2021-05-05 | Stop reason: SDUPTHER

## 2021-05-05 RX ORDER — NEOSTIGMINE METHYLSULFATE 5 MG/5 ML
SYRINGE (ML) INTRAVENOUS PRN
Status: DISCONTINUED | OUTPATIENT
Start: 2021-05-05 | End: 2021-05-05 | Stop reason: SDUPTHER

## 2021-05-05 RX ORDER — FENTANYL CITRATE 50 UG/ML
50 INJECTION, SOLUTION INTRAMUSCULAR; INTRAVENOUS EVERY 5 MIN PRN
Status: DISCONTINUED | OUTPATIENT
Start: 2021-05-05 | End: 2021-05-05 | Stop reason: HOSPADM

## 2021-05-05 RX ORDER — HYDROCODONE BITARTRATE AND ACETAMINOPHEN 5; 325 MG/1; MG/1
1 TABLET ORAL ONCE
Status: COMPLETED | OUTPATIENT
Start: 2021-05-05 | End: 2021-05-05

## 2021-05-05 RX ORDER — PROPOFOL 10 MG/ML
INJECTION, EMULSION INTRAVENOUS PRN
Status: DISCONTINUED | OUTPATIENT
Start: 2021-05-05 | End: 2021-05-05 | Stop reason: SDUPTHER

## 2021-05-05 RX ORDER — DEXAMETHASONE SODIUM PHOSPHATE 10 MG/ML
INJECTION, EMULSION INTRAMUSCULAR; INTRAVENOUS PRN
Status: DISCONTINUED | OUTPATIENT
Start: 2021-05-05 | End: 2021-05-05 | Stop reason: SDUPTHER

## 2021-05-05 RX ORDER — PROMETHAZINE HYDROCHLORIDE 25 MG/ML
6.25 INJECTION, SOLUTION INTRAMUSCULAR; INTRAVENOUS
Status: COMPLETED | OUTPATIENT
Start: 2021-05-05 | End: 2021-05-05

## 2021-05-05 RX ORDER — LABETALOL 20 MG/4 ML (5 MG/ML) INTRAVENOUS SYRINGE
5 EVERY 10 MIN PRN
Status: DISCONTINUED | OUTPATIENT
Start: 2021-05-05 | End: 2021-05-05 | Stop reason: HOSPADM

## 2021-05-05 RX ORDER — SODIUM CHLORIDE 0.9 % (FLUSH) 0.9 %
5-40 SYRINGE (ML) INJECTION EVERY 12 HOURS SCHEDULED
Status: DISCONTINUED | OUTPATIENT
Start: 2021-05-05 | End: 2021-05-05 | Stop reason: HOSPADM

## 2021-05-05 RX ORDER — ONDANSETRON 2 MG/ML
INJECTION INTRAMUSCULAR; INTRAVENOUS PRN
Status: DISCONTINUED | OUTPATIENT
Start: 2021-05-05 | End: 2021-05-05 | Stop reason: SDUPTHER

## 2021-05-05 RX ORDER — BUPIVACAINE HYDROCHLORIDE 5 MG/ML
INJECTION, SOLUTION EPIDURAL; INTRACAUDAL PRN
Status: DISCONTINUED | OUTPATIENT
Start: 2021-05-05 | End: 2021-05-05 | Stop reason: ALTCHOICE

## 2021-05-05 RX ORDER — MEPERIDINE HYDROCHLORIDE 25 MG/ML
12.5 INJECTION INTRAMUSCULAR; INTRAVENOUS; SUBCUTANEOUS EVERY 5 MIN PRN
Status: DISCONTINUED | OUTPATIENT
Start: 2021-05-05 | End: 2021-05-05 | Stop reason: HOSPADM

## 2021-05-05 RX ORDER — SODIUM CHLORIDE 0.9 % (FLUSH) 0.9 %
5-40 SYRINGE (ML) INJECTION PRN
Status: DISCONTINUED | OUTPATIENT
Start: 2021-05-05 | End: 2021-05-05 | Stop reason: HOSPADM

## 2021-05-05 RX ORDER — HYDROCODONE BITARTRATE AND ACETAMINOPHEN 5; 325 MG/1; MG/1
TABLET ORAL
Status: COMPLETED
Start: 2021-05-05 | End: 2021-05-05

## 2021-05-05 RX ORDER — FENTANYL CITRATE 50 UG/ML
INJECTION, SOLUTION INTRAMUSCULAR; INTRAVENOUS PRN
Status: DISCONTINUED | OUTPATIENT
Start: 2021-05-05 | End: 2021-05-05 | Stop reason: SDUPTHER

## 2021-05-05 RX ORDER — SODIUM CHLORIDE 9 MG/ML
INJECTION, SOLUTION INTRAVENOUS CONTINUOUS
Status: DISCONTINUED | OUTPATIENT
Start: 2021-05-05 | End: 2021-05-05 | Stop reason: HOSPADM

## 2021-05-05 RX ORDER — ROCURONIUM BROMIDE 10 MG/ML
INJECTION, SOLUTION INTRAVENOUS PRN
Status: DISCONTINUED | OUTPATIENT
Start: 2021-05-05 | End: 2021-05-05 | Stop reason: SDUPTHER

## 2021-05-05 RX ORDER — ONDANSETRON 2 MG/ML
4 INJECTION INTRAMUSCULAR; INTRAVENOUS
Status: DISCONTINUED | OUTPATIENT
Start: 2021-05-05 | End: 2021-05-05 | Stop reason: HOSPADM

## 2021-05-05 RX ORDER — MIDAZOLAM HYDROCHLORIDE 1 MG/ML
INJECTION INTRAMUSCULAR; INTRAVENOUS PRN
Status: DISCONTINUED | OUTPATIENT
Start: 2021-05-05 | End: 2021-05-05 | Stop reason: SDUPTHER

## 2021-05-05 RX ORDER — SODIUM CHLORIDE 9 MG/ML
25 INJECTION, SOLUTION INTRAVENOUS PRN
Status: DISCONTINUED | OUTPATIENT
Start: 2021-05-05 | End: 2021-05-05 | Stop reason: HOSPADM

## 2021-05-05 RX ORDER — CEFOXITIN 1 G/1
INJECTION, POWDER, FOR SOLUTION INTRAVENOUS PRN
Status: DISCONTINUED | OUTPATIENT
Start: 2021-05-05 | End: 2021-05-05 | Stop reason: SDUPTHER

## 2021-05-05 RX ADMIN — MEPERIDINE HYDROCHLORIDE 12.5 MG: 25 INJECTION INTRAMUSCULAR; INTRAVENOUS; SUBCUTANEOUS at 12:55

## 2021-05-05 RX ADMIN — MEPERIDINE HYDROCHLORIDE 12.5 MG: 25 INJECTION INTRAMUSCULAR; INTRAVENOUS; SUBCUTANEOUS at 13:00

## 2021-05-05 RX ADMIN — SODIUM CHLORIDE: 9 INJECTION, SOLUTION INTRAVENOUS at 11:59

## 2021-05-05 RX ADMIN — SODIUM CHLORIDE: 9 INJECTION, SOLUTION INTRAVENOUS at 11:11

## 2021-05-05 RX ADMIN — PROPOFOL 200 MG: 10 INJECTION, EMULSION INTRAVENOUS at 12:04

## 2021-05-05 RX ADMIN — CEFOXITIN SODIUM 2000 MG: 2 POWDER, FOR SOLUTION INTRAVENOUS at 12:08

## 2021-05-05 RX ADMIN — ONDANSETRON HYDROCHLORIDE 4 MG: 4 INJECTION, SOLUTION INTRAMUSCULAR; INTRAVENOUS at 12:10

## 2021-05-05 RX ADMIN — FENTANYL CITRATE 100 MCG: 50 INJECTION, SOLUTION INTRAMUSCULAR; INTRAVENOUS at 12:04

## 2021-05-05 RX ADMIN — PROMETHAZINE HYDROCHLORIDE 6.25 MG: 25 INJECTION INTRAMUSCULAR; INTRAVENOUS at 12:52

## 2021-05-05 RX ADMIN — HYDROCODONE BITARTRATE AND ACETAMINOPHEN 1 TABLET: 5; 325 TABLET ORAL at 13:46

## 2021-05-05 RX ADMIN — Medication 0.8 MG: at 12:27

## 2021-05-05 RX ADMIN — FENTANYL CITRATE 50 MCG: 50 INJECTION, SOLUTION INTRAMUSCULAR; INTRAVENOUS at 12:12

## 2021-05-05 RX ADMIN — ROCURONIUM BROMIDE 40 MG: 10 INJECTION INTRAVENOUS at 12:04

## 2021-05-05 RX ADMIN — SODIUM CHLORIDE: 9 INJECTION, SOLUTION INTRAVENOUS at 12:30

## 2021-05-05 RX ADMIN — Medication 5 MG: at 12:27

## 2021-05-05 RX ADMIN — FENTANYL CITRATE 50 MCG: 50 INJECTION, SOLUTION INTRAMUSCULAR; INTRAVENOUS at 12:29

## 2021-05-05 RX ADMIN — MIDAZOLAM 2 MG: 1 INJECTION INTRAMUSCULAR; INTRAVENOUS at 11:59

## 2021-05-05 RX ADMIN — DEXAMETHASONE SODIUM PHOSPHATE 4 MG: 10 INJECTION, EMULSION INTRAMUSCULAR; INTRAVENOUS at 12:10

## 2021-05-05 ASSESSMENT — PULMONARY FUNCTION TESTS
PIF_VALUE: 2
PIF_VALUE: 13
PIF_VALUE: 23
PIF_VALUE: 3
PIF_VALUE: 27
PIF_VALUE: 15
PIF_VALUE: 3
PIF_VALUE: 28
PIF_VALUE: 27
PIF_VALUE: 0
PIF_VALUE: 2
PIF_VALUE: 2
PIF_VALUE: 24
PIF_VALUE: 24
PIF_VALUE: 28
PIF_VALUE: 17
PIF_VALUE: 27
PIF_VALUE: 26
PIF_VALUE: 28
PIF_VALUE: 0

## 2021-05-05 ASSESSMENT — PAIN SCALES - GENERAL
PAINLEVEL_OUTOF10: 0
PAINLEVEL_OUTOF10: 0

## 2021-05-05 ASSESSMENT — PAIN DESCRIPTION - PAIN TYPE: TYPE: SURGICAL PAIN

## 2021-05-05 NOTE — OP NOTE
Netta Mariee  RECORD OF OPERATION  PATIENT NAME: [address] [place] [address] RECORD NO. [id number]  SURGEON: Demarco Alvarez MD  Primary Care Physician: NAMAN Ovalles CNP     PROCEDURE PERFORMED:  5/5/2021  PREOPERATIVE DIAGNOSIS: RUQ ABD PAIN, NAUSEA AND VOMITING  POSTOPERATIVE DIAGNOSIS: Same, path pending  PROCEDURE PERFORMED:  Robotic Assisted Laparoscopic cholecystectomy. SURGEON:  Dr. Lauren Alcantar:  General with local.       DISCUSSION: Robin Clifford is a 27y.o. year old female who was seen in evaluation at request of NAMAN Ovalles CNP regarding signs and symptoms, gallbladder disease, radiographic findings of gallbladder sludge persistent nausea vomiting and a hyperactive gallbladder with ejection fraction of 100%. Robin Clifford was seen and evaluated, operative and non operative management was discussed. Laparoscopic and open approaches reviewed. She was given opportunity to ask questions. Once answered informed consent was obtained. Patient counseled removal of gallbladder may not alleviate symptoms. OPERATIVE FINDINGS:    -Distended gallbladder without acute inflammation       PROCEDURE:  Robin Clifford was seen the preoperative holding room where informed consent was reviewed and all questions answered. The patient was brought to the operating room and placed on the operating table in supine position. After induction of adequate anesthesia a formal timeout was performed and the patient was prepped and draped in the normal sterile fashion. She had been given ICG green dye preoperatively as well as Mefoxin intravenously. Timeout was performed. Initial skin incision was made below the umbilicus carried down to the fascia. The fascia was elevated and incised. 12 mm port was inserted in an open fashion. CO2 pneumoperitoneum was introduced followed by the camera. Liver appeared healthy.   Stomach appeared normal.  There was no significant hiatal hernia seen.  The gallbladder was distended but not acutely inflamed. Additional 8 mm ports were placed one on the right and one on the left under direct visualization. 5 mm cyst in subcostal port was placed on the far right also under direct visualization. The patient was positioned in reverse Trendelenburg and the left side was down. The X I robot was then docked from the patient's right side. Camera and instruments were inserted. I then retired to the console. My assistant PHONG Alfredo grabbed the gallbladder by the fundus and retracted it over the liver edge. Firefly was utilized throughout common bile duct was easily identified. Cystic duct and artery were dissected out from the fatty areolar tissue and the critical view was clearly obtained. The cystic duct and artery were then clipped proximally and distally with hemolock clips. They were divided. The gallbladder was then dissected off the liver bed using cautery technique. Once this was completed the liver bed was examined there is no evidence of bile leak or bleeding. I scrubbed back into the patient's bedside. The robot was undocked after instruments were removed. CO2 pneumoperitoneum was suctioned from the abdominal cavity after the gallbladder was placed in an Endo Catch type bag and removed from the abdomen. The infraumbilical fascia was closed with interrupted 0 Ethibond suture. All port sites were infiltrated with half percent Marcaine. Skin incisions were closed in a subcuticular fashion and skin glue applied. Postoperative findings were discussed with the patient's family. She was given discharge instructions, prescriptions for analgesics and antiemetics. She will follow up in my office in a 2-week period of time for reevaluation. ESTIMATED BLOOD LOSS: 10 ml    SPECIMEN:  Gallbladder sent to pathology for analysis. COMPLICATIONS:  None immediately appreciated.     Electronically signed by Miranda Elmore MD on 5/5/2021 at 12:41 PM

## 2021-05-05 NOTE — ANESTHESIA PRE PROCEDURE
medications:    Current Facility-Administered Medications   Medication Dose Route Frequency Provider Last Rate Last Admin    sodium chloride flush 0.9 % injection 5-40 mL  5-40 mL Intravenous 2 times per day Fabiola Wheeler MD        sodium chloride flush 0.9 % injection 5-40 mL  5-40 mL Intravenous PRN Fabiola Wheeler MD        0.9 % sodium chloride infusion  25 mL Intravenous PRN Fabiola Wheeler MD        cefOXitin (MEFOXIN) 2000 mg in dextrose 5% 50 mL (mini-bag)  2,000 mg Intravenous On Call to [address] [place] [address] [place], [place]           Allergies:  No Known Allergies    Problem List:    Patient Active Problem List   Diagnosis Code    Schizophrenia ([facility] F20.9    Tachycardia R00.0    Chest pain R07.9    Sepsis ([facility] A41.9    Leukocytosis D72.829    Normocytic anemia D64.9    Bilateral atelectasis J67.80    Metabolic acidosis T45.8    Atypical chest pain R07.89    Morbid obesity (HCC) E66.01    Bipolar 1 disorder ([facility] F31.9    Tobacco abuse Z72.0    BMI 40.0-44.9, adult ([facility] Z68.41       Past Medical History:        Diagnosis Date    Arthritis     Bipolar 1 disorder ([facility]     Fibromyalgia     Pre-diabetes     Psychosis ([facility]     Sch[facility]     Stomach pain        Past Surgical History:        Procedure Laterality Date    COLONOSCOPY  02/2021    Dr. Juli Bangura EGD  02/2021    Juli Bangura WISDOM TOOTH EXTRACTION         Social History:    Social History     Tobacco Use    Smoking status: Current Every Day Smoker     Packs/day: 2.00     Types: Cigarettes    Smokeless tobacco: Never Used   Substance Use Topics    Alcohol use: Not Currently                                Ready to quit: Not Answered  Counseling given: Not Answered      Vital Signs (Current):   Vitals:    05/05/21 1031 05/05/21 1035 05/05/21 1043   BP: 127/76     Pulse: 97     Resp: 18     Temp: 97.8 °F (36.6 °C)     SpO2: 99%     Weight:   246 lb (111.6 kg)   Height:  5' 3\" (1.6 m) 5' 3\" (1.6 m)                                              BP Readings from Last 3 Encounters:   05/05/21 127/76   04/29/21 122/62   04/27/21 117/67       NPO Status: Time of last liquid consumption: 0800                        Time of last solid consumption: 2100                        Date of last liquid consumption: 05/05/21                        Date of last solid food consumption: 05/04/21    BMI:   Wt Readings from Last 3 Encounters:   05/05/21 246 lb (111.6 kg)   04/29/21 245 lb (111.1 kg)   04/27/21 245 lb (111.1 kg)     Body mass index is 43.58 kg/m². CBC:   Lab Results   Component Value Date    WBC 15.3 04/27/2021    RBC 4.40 04/27/2021    HGB 12.9 04/27/2021    HCT 40.0 04/27/2021    MCV 90.9 04/27/2021     04/27/2021       CMP:   Lab Results   Component Value Date     04/27/2021    K 3.7 04/27/2021    K 4.3 09/24/2020     04/27/2021    CO2 21 04/27/2021    BUN 7 04/27/2021    CREATININE 0.7 04/27/2021    LABGLOM >90 04/27/2021    GLUCOSE 75 04/27/2021    PROT 7.0 04/27/2021    CALCIUM 9.3 04/27/2021    BILITOT <0.2 04/27/2021    ALKPHOS 66 04/27/2021    AST 12 04/27/2021    ALT 14 04/27/2021       POC Tests: No results for input(s): POCGLU, POCNA, POCK, POCCL, POCBUN, POCHEMO, POCHCT in the last 72 hours.     Coags: No results found for: PROTIME, INR, APTT    HCG (If Applicable):   Lab Results   Component Value Date    PREGTESTUR NEGATIVE 04/11/2021    PREGSERUM NEGATIVE 04/27/2021        ABGs: No results found for: PHART, PO2ART, CYE5FHD, SDN5BOA, BEART, U0WARSMK     Type & Screen (If Applicable):  No results found for: LABABO, LABRH    Drug/Infectious Status (If Applicable):  No results found for: HIV, HEPCAB    COVID-19 Screening (If Applicable):   Lab Results   Component Value Date    COVID19 Not Detected 04/29/2021           Anesthesia Evaluation   no history of anesthetic complications:   Airway: Mallampati: II  TM distance: >3 FB   Neck ROM: full  Mouth opening: > = 3 FB Dental:          Pulmonary:normal exam              Patient did

## 2021-05-05 NOTE — BRIEF OP NOTE
Brief Postoperative Note      Patient: Destiny Valdez  YOB: 1995  MRN: [id number]    Date of Procedure: 5/5/2021    Pre-Op Diagnosis: RUQ ABD PAIN, NAUSEA AND VOMITING, biliary colic    Post-Op Diagnosis: Same       Procedure(s):  ROBOTIC CHOLECYSTECTOMY    Surgeon(s):  Dandre Mckinnon MD    Assistant:  First Assistant: Maxwell Bryan RN    Anesthesia: General    Estimated Blood Loss (mL): Minimal    Complications: None    Specimens:   ID Type Source Tests Collected by Time Destination   A : gallbladder Tissue Gallbladder SURGICAL PATHOLOGY Dandre Mckinnon MD 5/5/2021 1215        Implants:  * No implants in log *      Drains: * No LDAs found *    Findings: distended gb    Electronically signed by Dandre Mckinnon MD on 5/5/2021 at 12:38 PM

## 2021-05-05 NOTE — ANESTHESIA POSTPROCEDURE EVALUATION
Department of Anesthesiology  Postprocedure Note    Patient: Jules Rivera  MRN: [id number]  YOB: 1995  Date of evaluation: 5/5/2021  Time:  2:51 PM     Procedure Summary     Date: 05/05/21 Room / Location: [address] [place] [address] CARMINE Hanley    Anesthesia Start: 1159 Anesthesia Stop: 1244    Procedure: ROBOTIC CHOLECYSTECTOMY (N/A Abdomen) Diagnosis: (RUQ ABD PAIN, NAUSEA AND VOMITING)    Surgeons: Katie Montes MD Responsible Provider: Venkata Andino MD    Anesthesia Type: general ASA Status: 3          Anesthesia Type: general    Veronica Phase I: Veronica Score: 10    Veronica Phase II: Veronica Score: 10    Last vitals: Reviewed and per EMR flowsheets. Anesthesia Post Evaluation    Patient location during evaluation: PACU  Patient participation: complete - patient participated  Level of consciousness: awake and alert  Airway patency: patent  Nausea & Vomiting: no nausea and no vomiting  Complications: no  Cardiovascular status: hemodynamically stable  Respiratory status: acceptable  Hydration status: euvolemic      ST. [address]  POST-ANESTHESIA NOTE       Name:  Jules Rivera                                         Age:  27 y.o.   MRN:  [id number]      Last Vitals:  /69   Pulse 91   Temp 97 °F (36.1 °C) (Temporal)   Resp 18   Ht 5' 3\" (1.6 m)   Wt 246 lb (111.6 kg)   SpO2 96%   BMI 43.58 kg/m²   Patient Vitals for the past 4 hrs:   BP Temp Temp src Pulse Resp SpO2   05/05/21 1417 104/69   91 18 96 %   05/05/21 1347 (!) 152/82   99 18 96 %   05/05/21 1322 122/64 97 °F (36.1 °C) Temporal 94 18 96 %   05/05/21 1315 (!) 157/77   82 23 98 %   05/05/21 1310 (!) 162/66   85 24 96 %   05/05/21 1305 (!) 164/99   100 26 98 %   05/05/21 1300 123/73   97 21 97 %   05/05/21 1255 116/67   95 22 96 %   05/05/21 1250 (!) 114/56   103 25 93 %   05/05/21 1245 (!) 114/58   109 (!) 31 (!) 88 %   05/05/21 1242 (!) 116/52 97.5 °F (36.4 °C) Temporal 122 28 (!) 81 %       Level of

## 2021-05-05 NOTE — H&P
[address] [address]  History and Physical Update    Pt Name: Roger Mix  MRN: [id number]  YOB: 1995  Date of evaluation: 5/5/2021    [x] I have examined the patient and reviewed the H&P/Consult and there are no changes to the patient or plans. [] I have examined the patient and reviewed the H&P/Consult and have noted the following changes:        Coralie Meigs MD  Electronically signed 5/5/2021 at 10:51 Maria Fernanda Phillips MD   General Surgery  New Patient Evaluation in Office  Pt Name: Roger Mix  Date of Birth 1995   Today's Date: 4/29/2021  Medical Record Number: [id number]  Referring Provider: NAMAN Roberts -*  Primary Care Provider: NAMAN Reynolds - CNP  Chief Complaint:       Chief Complaint   Patient presents with    Surgical Consult       New patient ref Tyrone Vaughn-- RUQ pain, N/V, GERD         ASSESSMENT   1. RUQ abdominal pain    2. Nausea and vomiting, intractability of vomiting not specified, unspecified vomiting type    3. Pre-op testing    4. Bipolar 1 disorder ([facility]    5. Undifferentiated schizophrenia ([facility]    6. Morbid obesity ([facility]    4. Hyperactive gallbladder  PLANS   1. Patient has had extensive GI evaluation. HIDA scan showed an overactive gallbladder with an ejection fraction of 100%. Patient has multiple complaints. She has been extensively counseled by GI as well as myself that removal of the gallbladder likely will not alleviate all of her symptoms. She has an at least 50% chance it will not help any of her symptoms. 2.  Patient does wish to proceed with cholecystectomy after extensive counseling. She is aware of all risks. Surgical risks include but not limited to bleeding, infection, bile duct leak, postoperative diarrhea, organ injury, bile duct injury as well as persistence of symptoms. She expressed understanding wishes to proceed. 3.  Schedule patient for robotic assisted laparoscopic cholecystectomy.   4. Preoperative testing per anesthesia guidelines including COVID-19 screen. 5.  General anesthesia         SUBJECTIVE      Heather Johansen is a 27y.o. year old female who is presenting today in the office for evaluation for possible cholecystectomy. Patient reports multiple abdominal complaints for several months even years. She complains of abdominal pain bloating constipation as well as nausea and vomiting after almost eating anything. She states it is even hard to drink water. Patient does have a history of schizophrenia and lives and what sounds like a group home situation. She reports she, however, does not make her own decisions. She was referred here by Kiera Stephen. She had a recent HIDA scan that showed an ejection fraction of 100%. Patient reports symptoms are related to eating. Her symptoms are not classic for biliary colic. EGD and colonoscopy performed in February of this year by Dr. Danis Lafleur. She had mild gastritis and a normal colonoscopy.   Random biopsies showed no histopathologic abnormality.     Past Medical History  Past Medical History        Past Medical History:   Diagnosis Date    Arthritis      Bipolar 1 disorder (HCC)      Fibromyalgia      Pre-diabetes      Psychosis (HCC)      Schizophrenia (HCC)      Stomach pain            Past Surgical History  Past Surgical History         Past Surgical History:   Procedure Laterality Date    COLONOSCOPY   02/2021     Dr. Christoph Pollard EGD   02/2021     [place]    WISDOM TOOTH EXTRACTION              Medications  Current Facility-Administered Medications          Current Outpatient Medications   Medication Sig Dispense Refill    carBAMazepine (TEGRETOL) 200 MG tablet Take 200 mg by mouth 3 times daily        ondansetron (ZOFRAN ODT) 4 MG disintegrating tablet Take 1 tablet by mouth every 8 hours as needed for Nausea 12 tablet 0    MARCELLO 3-0.02 MG per tablet          traZODone (DESYREL) 100 MG tablet          FANAPT 4 MG TABS tablet          busPIRone (BUSPAR) 5 MG tablet          pantoprazole (PROTONIX) 40 MG tablet Take 1 tablet by mouth every morning (before breakfast) 30 tablet 6    clonazePAM (KLONOPIN) 0.5 MG tablet Take 0.5 mg by mouth 2 times daily as needed.        VORTIoxetine (TRINTELLIX) 5 MG tablet Take 10 mg by mouth daily        paliperidone palmitate ER (INVEGA SUSTENNA) 234 MG/1.5ML FABIANA IM injection Inject into the muscle every 21 days        trihexyphenidyl (ARTANE) 5 MG tablet Take 5 mg by mouth 2 times daily         albuterol sulfate HFA (PROVENTIL HFA) 108 (90 Base) MCG/ACT inhaler Inhale 2 puffs into the lungs every 4 hours as needed for Wheezing or Shortness of Breath (Space out to every 6 hours as symptoms improve) Space out to every 6 hours as symptoms improve.  1 Inhaler 0    metFORMIN (GLUCOPHAGE) 500 MG tablet Take 500 mg by mouth 2 times daily (with meals)          No current facility-administered medications for this visit.          Allergies   No Known Allergies    Family History  Family History         Family History   Problem Relation Age of Onset    Colon Cancer Neg Hx      Colon Polyps Neg Hx      Esophageal Cancer Neg Hx            SocialHistory  Social History               Socioeconomic History    Marital status: Single       Spouse name: Not on file    Number of children: Not on file    Years of education: Not on file    Highest education level: Not on file   Occupational History    Not on file   Social Needs    Financial resource strain: Not hard at all   [place] insecurity       Worry: Never true       Inability: Never true    Transportation needs       Medical: No       Non-medical: No   Tobacco Use    Smoking status: Current Every Day Smoker       Packs/day: 2.00       Types: Cigarettes    Smokeless tobacco: Never Used   Substance and Sexual Activity    Alcohol use: Not Currently    Drug use: No    Sexual activity: Not Currently       Partners: Male   Lifestyle    Physical activity       Days per week: Not on file       Minutes per session: Not on file    Stress: Not on file   Relationships    Social connections       Talks on phone: Not on file       Gets together: Not on file       Attends Yarsanism service: Not on file       Active member of club or organization: Not on file       Attends meetings of clubs or organizations: Not on file       Relationship status: Not on file    Intimate partner violence       Fear of current or ex partner: Not on file       Emotionally abused: Not on file       Physically abused: Not on file       Forced sexual activity: Not on file   Other Topics Concern    Not on file   Social History Narrative    Not on file              Review of Systems  Review of Systems   Constitutional: Positive for appetite change and fatigue. Negative for chills, fever and unexpected weight change. HENT: Positive for drooling, ear pain, facial swelling and trouble swallowing. Negative for sore throat and voice change. Eyes: Positive for pain. Negative for visual disturbance. Respiratory: Positive for cough, chest tightness and shortness of breath. Negative for wheezing. Cardiovascular: Positive for chest pain. Negative for palpitations. Gastrointestinal: Positive for abdominal pain, constipation, nausea and vomiting. Negative for blood in stool. Endocrine: Negative for cold intolerance, heat intolerance and polydipsia. Genitourinary: Positive for difficulty urinating and flank pain. Negative for dysuria and hematuria. Musculoskeletal: Negative for gait problem, joint swelling and myalgias. Skin: Negative for color change and rash. Allergic/Immunologic: Negative for immunocompromised state. Neurological: Positive for dizziness, speech difficulty, weakness and light-headedness. Negative for tremors and seizures. Hematological: Does not bruise/bleed easily. Psychiatric/Behavioral: Positive for agitation, confusion and sleep disturbance.  Negative for behavioral problems and suicidal ideas. The patient is nervous/anxious.          OBJECTIVE      /62 (Site: Right Lower Arm, Position: Sitting, Cuff Size: Medium Adult)   Pulse 100   Temp 97.8 °F (36.6 °C) (Tympanic)   Resp 15   Ht 5' 3\" (1.6 m)   Wt 245 lb (111.1 kg)   LMP 12/01/2020   SpO2 98%   BMI 43.40 kg/m²       Physical Exam  Vitals signs reviewed. Constitutional:       Appearance: She is well-developed. She is obese. She is not ill-appearing or diaphoretic. HENT:      Head: Normocephalic and atraumatic. Nose: No congestion. Eyes:      General: No scleral icterus. Extraocular Movements: Extraocular movements intact. Pupils: Pupils are equal, round, and reactive to light. Neck:      Musculoskeletal: Normal range of motion and neck supple. Thyroid: No thyromegaly. Vascular: No JVD. Trachea: No tracheal deviation. Cardiovascular:      Rate and Rhythm: Normal rate and regular rhythm. Heart sounds: Normal heart sounds. No murmur. Pulmonary:      Effort: Pulmonary effort is normal. No respiratory distress. Breath sounds: Normal breath sounds. No wheezing. Abdominal:      General: Bowel sounds are normal. There is no distension. Palpations: Abdomen is soft. There is no mass. Tenderness: There is no abdominal tenderness. There is no guarding or rebound. Hernia: No hernia is present. Musculoskeletal: Normal range of motion. Right lower leg: No edema. Left lower leg: No edema. Lymphadenopathy:      Cervical: No cervical adenopathy. Skin:     General: Skin is warm and dry. Coloration: Skin is not jaundiced or pale. Findings: No bruising or rash. Neurological:      General: No focal deficit present. Mental Status: She is alert and oriented to person, place, and time. Cranial Nerves: No cranial nerve deficit. Psychiatric:      Comments: Affect on the flatter side. No agitation or confusion. administration. 2.18 mcg Kinevac were infused over 60 minutes while additional left anterior oblique images were obtained. Subsequently, a region of    interest was drawn around the gallbladder and ejection fraction was calculated.       COMPARISON: None       FINDINGS: There is normal hepatic uptake of radiotracer. Activity is present in the gallbladder, common bile duct and small bowel by 17 minutes.       Following Kinevac administration, the calculated gallbladder ejection fraction is 100% (normal is 35% or greater).         Impression   1. Patent cystic and common bile ducts without evidence of acute cholecystitis. 2. Normal gallbladder ejection fraction.       Final report electronically signed by Dr. Jasmin Tomas on 4/15/2021 1:18 PM              PROCEDURE: US GALLBLADDER RUQ       CLINICAL INFORMATION: Right upper quadrant pain       TECHNIQUE: Ultrasound of the upper quadrant was performed. Grayscale and color images were obtained.       COMPARISON: None       FINDINGS: The gallbladder is normal without evidence of wall thickening, calculi or pericholecystic fluid. The common bile duct is normal, measuring 0.3 cm. There is no intrahepatic biliary ductal dilation. The liver is normal in echotexture.  Visualized    portions of the pancreas and right kidney are unremarkable.           Impression       Normal gallbladder ultrasound without evidence of cholelithiasis.       Final report electronically signed by Dr. Jasmin Tomas on 4/11/2021 2:35 PM

## 2021-05-06 ENCOUNTER — TELEPHONE (OUTPATIENT)
Dept: SURGERY | Age: 26
End: 2021-05-06

## 2021-05-20 ENCOUNTER — OFFICE VISIT (OUTPATIENT)
Dept: SURGERY | Age: 26
End: 2021-05-20

## 2021-05-20 VITALS
TEMPERATURE: 97.8 F | HEIGHT: 66 IN | RESPIRATION RATE: 18 BRPM | WEIGHT: 248.4 LBS | OXYGEN SATURATION: 98 % | SYSTOLIC BLOOD PRESSURE: 122 MMHG | BODY MASS INDEX: 39.92 KG/M2 | HEART RATE: 110 BPM | DIASTOLIC BLOOD PRESSURE: 80 MMHG

## 2021-05-20 DIAGNOSIS — R10.11 RUQ ABDOMINAL PAIN: Primary | ICD-10-CM

## 2021-05-20 DIAGNOSIS — Z09 POSTOP CHECK: ICD-10-CM

## 2021-05-20 DIAGNOSIS — K80.10 CALCULUS OF GALLBLADDER WITH CHRONIC CHOLECYSTITIS WITHOUT OBSTRUCTION: ICD-10-CM

## 2021-05-20 PROCEDURE — 99024 POSTOP FOLLOW-UP VISIT: CPT | Performed by: SURGERY

## 2021-05-26 ENCOUNTER — HOSPITAL ENCOUNTER (OUTPATIENT)
Dept: GENERAL RADIOLOGY | Age: 26
Discharge: HOME | End: 2021-05-26
Payer: MEDICAID

## 2021-05-26 DIAGNOSIS — R13.10 PAIN WITH SWALLOWING: ICD-10-CM

## 2021-05-26 DIAGNOSIS — R13.14 PHARYNGOESOPHAGEAL DYSPHAGIA: ICD-10-CM

## 2021-05-26 PROCEDURE — 2500000003 HC RX 250 WO HCPCS: Performed by: NURSE PRACTITIONER

## 2021-05-26 PROCEDURE — 6370000000 HC RX 637 (ALT 250 FOR IP): Performed by: NURSE PRACTITIONER

## 2021-05-26 PROCEDURE — 6360000004 HC RX CONTRAST MEDICATION: Performed by: NURSE PRACTITIONER

## 2021-05-26 PROCEDURE — 74220 X-RAY XM ESOPHAGUS 1CNTRST: CPT

## 2021-05-26 PROCEDURE — A4641 RADIOPHARM DX AGENT NOC: HCPCS | Performed by: NURSE PRACTITIONER

## 2021-05-26 RX ADMIN — BARIUM SULFATE 140 ML: 980 POWDER, FOR SUSPENSION ORAL at 10:38

## 2021-05-26 RX ADMIN — BARIUM SULFATE 100 ML: 0.6 SUSPENSION ORAL at 10:37

## 2021-05-26 RX ADMIN — ANTACID/ANTIFLATULENT 1 EACH: 380; 550; 10; 10 GRANULE, EFFERVESCENT ORAL at 10:38

## 2021-05-27 ENCOUNTER — HOSPITAL ENCOUNTER (EMERGENCY)
Age: 26
Discharge: HOME | End: 2021-05-27
Attending: EMERGENCY MEDICINE
Payer: MEDICAID

## 2021-05-27 ENCOUNTER — APPOINTMENT (OUTPATIENT)
Dept: GENERAL RADIOLOGY | Age: 26
End: 2021-05-27
Payer: MEDICAID

## 2021-05-27 VITALS
OXYGEN SATURATION: 97 % | WEIGHT: 250 LBS | DIASTOLIC BLOOD PRESSURE: 73 MMHG | HEART RATE: 87 BPM | TEMPERATURE: 97.9 F | BODY MASS INDEX: 40.35 KG/M2 | SYSTOLIC BLOOD PRESSURE: 97 MMHG | RESPIRATION RATE: 16 BRPM

## 2021-05-27 DIAGNOSIS — K59.00 CONSTIPATION, UNSPECIFIED CONSTIPATION TYPE: ICD-10-CM

## 2021-05-27 DIAGNOSIS — R10.13 DYSPEPSIA: Primary | ICD-10-CM

## 2021-05-27 LAB
ALBUMIN SERPL-MCNC: 4.2 G/DL (ref 3.5–5.1)
ALP BLD-CCNC: 80 U/L (ref 38–126)
ALT SERPL-CCNC: 13 U/L (ref 11–66)
ANION GAP SERPL CALCULATED.3IONS-SCNC: 11 MEQ/L (ref 8–16)
AST SERPL-CCNC: 13 U/L (ref 5–40)
BACTERIA: ABNORMAL /HPF
BASOPHILS # BLD: 0.3 %
BASOPHILS ABSOLUTE: 0 THOU/MM3 (ref 0–0.1)
BILIRUB SERPL-MCNC: < 0.2 MG/DL (ref 0.3–1.2)
BILIRUBIN DIRECT: < 0.2 MG/DL (ref 0–0.3)
BILIRUBIN URINE: NEGATIVE
BLOOD, URINE: NEGATIVE
BUN BLDV-MCNC: 7 MG/DL (ref 7–22)
CALCIUM SERPL-MCNC: 9.4 MG/DL (ref 8.5–10.5)
CASTS 2: ABNORMAL /LPF
CASTS UA: ABNORMAL /LPF
CHARACTER, URINE: CLEAR
CHLORIDE BLD-SCNC: 105 MEQ/L (ref 98–111)
CO2: 23 MEQ/L (ref 23–33)
COLOR: YELLOW
CREAT SERPL-MCNC: 0.8 MG/DL (ref 0.4–1.2)
CRYSTALS, UA: ABNORMAL
EKG ATRIAL RATE: 85 BPM
EKG P AXIS: 31 DEGREES
EKG P-R INTERVAL: 134 MS
EKG Q-T INTERVAL: 390 MS
EKG QRS DURATION: 82 MS
EKG QTC CALCULATION (BAZETT): 464 MS
EKG R AXIS: 5 DEGREES
EKG T AXIS: 12 DEGREES
EKG VENTRICULAR RATE: 85 BPM
EOSINOPHIL # BLD: 1.4 %
EOSINOPHILS ABSOLUTE: 0.2 THOU/MM3 (ref 0–0.4)
EPITHELIAL CELLS, UA: ABNORMAL /HPF
ERYTHROCYTE [DISTWIDTH] IN BLOOD BY AUTOMATED COUNT: 12.6 % (ref 11.5–14.5)
ERYTHROCYTE [DISTWIDTH] IN BLOOD BY AUTOMATED COUNT: 42.4 FL (ref 35–45)
GFR SERPL CREATININE-BSD FRML MDRD: 85 ML/MIN/1.73M2
GLUCOSE BLD-MCNC: 90 MG/DL (ref 70–108)
GLUCOSE URINE: NEGATIVE MG/DL
HCT VFR BLD CALC: 43.3 % (ref 37–47)
HEMOGLOBIN: 13.6 GM/DL (ref 12–16)
IMMATURE GRANS (ABS): 0.05 THOU/MM3 (ref 0–0.07)
IMMATURE GRANULOCYTES: 0.4 %
KETONES, URINE: NEGATIVE
LEUKOCYTE ESTERASE, URINE: ABNORMAL
LIPASE: 41.3 U/L (ref 5.6–51.3)
LYMPHOCYTES # BLD: 31.2 %
LYMPHOCYTES ABSOLUTE: 3.7 THOU/MM3 (ref 1–4.8)
MCH RBC QN AUTO: 28.9 PG (ref 26–33)
MCHC RBC AUTO-ENTMCNC: 31.4 GM/DL (ref 32.2–35.5)
MCV RBC AUTO: 91.9 FL (ref 81–99)
MISCELLANEOUS 2: ABNORMAL
MONOCYTES # BLD: 4.4 %
MONOCYTES ABSOLUTE: 0.5 THOU/MM3 (ref 0.4–1.3)
NITRITE, URINE: NEGATIVE
NUCLEATED RED BLOOD CELLS: 0 /100 WBC
OSMOLALITY CALCULATION: 275 MOSMOL/KG (ref 275–300)
PH UA: 7 (ref 5–9)
PLATELET # BLD: 371 THOU/MM3 (ref 130–400)
PMV BLD AUTO: 9.1 FL (ref 9.4–12.4)
POTASSIUM REFLEX MAGNESIUM: 4 MEQ/L (ref 3.5–5.2)
PREGNANCY, URINE: NEGATIVE
PROTEIN UA: NEGATIVE
RBC # BLD: 4.71 MILL/MM3 (ref 4.2–5.4)
RBC URINE: ABNORMAL /HPF
RENAL EPITHELIAL, UA: ABNORMAL
SEG NEUTROPHILS: 62.3 %
SEGMENTED NEUTROPHILS ABSOLUTE COUNT: 7.5 THOU/MM3 (ref 1.8–7.7)
SODIUM BLD-SCNC: 139 MEQ/L (ref 135–145)
SPECIFIC GRAVITY, URINE: 1.01 (ref 1–1.03)
TOTAL PROTEIN: 7.5 G/DL (ref 6.1–8)
TROPONIN T: < 0.01 NG/ML
UROBILINOGEN, URINE: 0.2 EU/DL (ref 0–1)
WBC # BLD: 12 THOU/MM3 (ref 4.8–10.8)
WBC UA: ABNORMAL /HPF
YEAST: ABNORMAL

## 2021-05-27 PROCEDURE — 84484 ASSAY OF TROPONIN QUANT: CPT

## 2021-05-27 PROCEDURE — 74022 RADEX COMPL AQT ABD SERIES: CPT

## 2021-05-27 PROCEDURE — 85025 COMPLETE CBC W/AUTO DIFF WBC: CPT

## 2021-05-27 PROCEDURE — 80048 BASIC METABOLIC PNL TOTAL CA: CPT

## 2021-05-27 PROCEDURE — 6370000000 HC RX 637 (ALT 250 FOR IP): Performed by: STUDENT IN AN ORGANIZED HEALTH CARE EDUCATION/TRAINING PROGRAM

## 2021-05-27 PROCEDURE — 80076 HEPATIC FUNCTION PANEL: CPT

## 2021-05-27 PROCEDURE — 81025 URINE PREGNANCY TEST: CPT

## 2021-05-27 PROCEDURE — 83690 ASSAY OF LIPASE: CPT

## 2021-05-27 PROCEDURE — 99284 EMERGENCY DEPT VISIT MOD MDM: CPT

## 2021-05-27 PROCEDURE — 36415 COLL VENOUS BLD VENIPUNCTURE: CPT

## 2021-05-27 PROCEDURE — 93005 ELECTROCARDIOGRAM TRACING: CPT | Performed by: STUDENT IN AN ORGANIZED HEALTH CARE EDUCATION/TRAINING PROGRAM

## 2021-05-27 PROCEDURE — 81001 URINALYSIS AUTO W/SCOPE: CPT

## 2021-05-27 RX ORDER — SUCRALFATE 1 G/1
1 TABLET ORAL 4 TIMES DAILY
Qty: 120 TABLET | Refills: 3 | Status: SHIPPED | OUTPATIENT
Start: 2021-05-27 | End: 2021-09-17 | Stop reason: SDUPTHER

## 2021-05-27 RX ORDER — DOCUSATE SODIUM 100 MG/1
100 CAPSULE, LIQUID FILLED ORAL 2 TIMES DAILY
Qty: 30 CAPSULE | Refills: 0 | Status: SHIPPED | OUTPATIENT
Start: 2021-05-27 | End: 2021-09-10

## 2021-05-27 RX ORDER — FAMOTIDINE 20 MG/1
20 TABLET, FILM COATED ORAL ONCE
Status: COMPLETED | OUTPATIENT
Start: 2021-05-27 | End: 2021-05-27

## 2021-05-27 RX ORDER — SUCRALFATE 1 G/1
1 TABLET ORAL ONCE
Status: COMPLETED | OUTPATIENT
Start: 2021-05-27 | End: 2021-05-27

## 2021-05-27 RX ADMIN — LIDOCAINE HYDROCHLORIDE: 20 SOLUTION ORAL; TOPICAL at 10:51

## 2021-05-27 RX ADMIN — SUCRALFATE 1 G: 1 TABLET ORAL at 10:51

## 2021-05-27 RX ADMIN — FAMOTIDINE 20 MG: 20 TABLET, FILM COATED ORAL at 10:51

## 2021-05-27 ASSESSMENT — ENCOUNTER SYMPTOMS
SHORTNESS OF BREATH: 0
ABDOMINAL PAIN: 1
COUGH: 0
BLOOD IN STOOL: 0
NAUSEA: 1
RHINORRHEA: 0
CONSTIPATION: 1
EYE REDNESS: 0
SINUS PAIN: 0
VOMITING: 0
SORE THROAT: 0
BACK PAIN: 0

## 2021-05-27 ASSESSMENT — PAIN DESCRIPTION - LOCATION: LOCATION: ABDOMEN

## 2021-05-27 ASSESSMENT — PAIN DESCRIPTION - PAIN TYPE: TYPE: ACUTE PAIN

## 2021-05-27 ASSESSMENT — PAIN SCALES - GENERAL
PAINLEVEL_OUTOF10: 3
PAINLEVEL_OUTOF10: 6

## 2021-05-27 NOTE — ED NOTES
shelbi Called for cab ride home     [facility], [address] [facility]  05/27/21 8780

## 2021-05-27 NOTE — ED NOTES
Bed: 029A  Expected date: 5/27/21  Expected time: 9:36 AM  Means of arrival: [facility]  Comments:     Gerson Sutton RN  05/27/21 7046

## 2021-05-27 NOTE — ED NOTES
Pt to the ED for constipation for the past 3 weeks since her gallbladder surgery. Pt states that she has never had problems with constipation before the surgery. Pt states that she was placed on miralax for her constipation, but has been unable to have a good BM. Pt reports pain of 6 out of 10 in her abd. Pt's last BM was yesterday, but she reports that it was not a good BM.      Vish Wharton RN  05/27/21 2223

## 2021-05-27 NOTE — ED PROVIDER NOTES
[place] ENCOUNTER          Pt Name: Keith Harry  MRN: [id number]  [place] 1995  Date of evaluation: 5/27/2021  Treating Resident Physician: Nelly Barnes MD  Supervising Physician: Dr. Debi Crump       Chief Complaint   Patient presents with    Constipation     History obtained from the patient. Shelbie Harry is a 27 y.o. female with past medical history of bipolar 1 disorder, fibromyalgia, schizophrenia, who presents to the emergency department for evaluation of burning abdominal pain and chest pain that is substernal over the past 10 days. Described to be worse in the AM.  Denies any shortness of breath cough fever or chills. Also complains of being constipated over the past 10 days. States she is having loose stools daily denies any watery diarrhea or blood in her stool. Patient was seen here 4/27/2021 for abdominal pain, followed up with general surgery had an extensive GI evaluation with a HIDA scan showing overactive bladder with an ejection fraction of 100%. Patient was counseled on options for cholecystectomy and agreed and underwent a robotic cholecystectomy on 5/5/2021. The following day patient was followed up by the general surgery team and was noted to have no pain no nausea or vomiting and was able to use the restroom with no issues. Denies any dysuria, hematuria, vaginal discharge or vaginal bleeding. The patient has no other acute complaints at this time. REVIEW OF SYSTEMS   Review of Systems   Constitutional: Negative for chills and fever. HENT: Negative for rhinorrhea, sinus pain and sore throat. Eyes: Negative for redness. Respiratory: Negative for cough and shortness of breath. Cardiovascular: Positive for chest pain. Gastrointestinal: Positive for abdominal pain, constipation and nausea.  Negative for blood in stool and vomiting. Genitourinary: Negative for dysuria and hematuria. Musculoskeletal: Negative for back pain. Skin: Negative for rash. Neurological: Negative for light-headedness and headaches. Psychiatric/Behavioral: Negative for agitation. PAST MEDICAL AND SURGICAL HISTORY     Past Medical History:   Diagnosis Date    Arthritis     [facility] [address] [facility]     Fibromyalgia     Pre-diabetes     Psychosis [facility] [address]     Schizophrenia [facility] [address]     Stomach pain      Past Surgical History:   Procedure Laterality Date    CHOLECYSTECTOMY, LAPAROSCOPIC N/A 5/5/2021    ROBOTIC CHOLECYSTECTOMY performed by Chitra Huggins MD at [place] [address]  02/2021    Dr. Vi Damon EGD  02/2021    Vi Damon WISDOM TOOTH EXTRACTION           MEDICATIONS   No current facility-administered medications for this encounter. Current Outpatient Medications:     docusate sodium (COLACE) 100 MG capsule, Take 1 capsule by mouth 2 times daily, Disp: 30 capsule, Rfl: 0    sucralfate (CARAFATE) 1 GM tablet, Take 1 tablet by mouth 4 times daily, Disp: 120 tablet, Rfl: 3    busPIRone (BUSPAR) 10 MG tablet, , Disp: , Rfl:     polyethylene glycol (GLYCOLAX) 17 GM/SCOOP powder, Take 17 g by mouth daily, Disp: 510 g, Rfl: 3    carBAMazepine (TEGRETOL) 200 MG tablet, Take 200 mg by mouth 3 times daily NOT TAKING, Disp: , Rfl:     ondansetron (ZOFRAN ODT) 4 MG disintegrating tablet, Take 1 tablet by mouth every 8 hours as needed for Nausea, Disp: 12 tablet, Rfl: 0    MARCELLO 3-0.02 MG per tablet, , Disp: , Rfl:     traZODone (DESYREL) 100 MG tablet, , Disp: , Rfl:     pantoprazole (PROTONIX) 40 MG tablet, Take 1 tablet by mouth every morning (before breakfast), Disp: 30 tablet, Rfl: 6    clonazePAM (KLONOPIN) 0.5 MG tablet, Take 0.5 mg by mouth 2 times daily as needed. , Disp: , Rfl:     paliperidone palmitate ER (INVEGA SUSTENNA) 234 MG/1.5ML FABIANA IM injection, Inject into the muscle every 21 days, Disp: , Rfl:     trihexyphenidyl (ARTANE) 5 MG tablet, Take 5 mg by mouth 2 times daily NOT TAKING, Disp: , Rfl:     albuterol sulfate HFA (PROVENTIL HFA) 108 (90 Base) MCG/ACT inhaler, Inhale 2 puffs into the lungs every 4 hours as needed for Wheezing or Shortness of Breath (Space out to every 6 hours as symptoms improve) Space out to every 6 hours as symptoms improve., Disp: 1 Inhaler, Rfl: 0    metFORMIN (GLUCOPHAGE) 500 MG tablet, Take 500 mg by mouth 2 times daily (with meals), Disp: , Rfl:       SOCIAL HISTORY     Social History     Social History Narrative    Not on file     Social History     Tobacco Use    Smoking status: Current Every Day Smoker     Packs/day: 2.00     Types: Cigarettes    Smokeless tobacco: Never Used   Vaping Use    Vaping Use: Never used   Substance Use Topics    Alcohol use: Not Currently    Drug use: No         ALLERGIES   No Known Allergies      FAMILY HISTORY     Family History   Problem Relation Age of Onset    Colon Cancer Neg Hx     Colon Polyps Neg Hx     Esophageal Cancer Neg Hx          PREVIOUS RECORDS   Previous records reviewed: Patient was seen here on 5/5/2021 and was admitted for biliary colic. PHYSICAL EXAM     ED Triage Vitals [05/27/21 0942]   BP Temp Temp Source Pulse Resp SpO2 Height Weight   (!) 134/92 97.9 °F (36.6 °C) Oral 97 18 97 % -- 250 lb (113.4 kg)     Initial vital signs and nursing assessment reviewed and normal. Pulsoximetry is normal per my interpretation. Additional Vital Signs:  Vitals:    05/27/21 1151   BP: 97/73   Pulse: 87   Resp: 16   Temp:    SpO2: 97%       Physical Exam  Vitals and nursing note reviewed. Constitutional:       General: She is not in acute distress. Appearance: Normal appearance. She is not toxic-appearing. HENT:      Head: Normocephalic and atraumatic.       Right Ear: External ear normal.      Left Ear: External ear normal.      Nose: Nose normal.      Mouth/Throat:      Mouth: Mucous membranes are moist.      Pharynx: Oropharynx is clear.   Eyes:      General: No scleral icterus. Extraocular Movements: Extraocular movements intact. Conjunctiva/sclera: Conjunctivae normal.   Cardiovascular:      Rate and Rhythm: Normal rate and regular rhythm. Pulses: Normal pulses. Heart sounds: Normal heart sounds. Pulmonary:      Effort: Pulmonary effort is normal. No respiratory distress. Breath sounds: Normal breath sounds. No wheezing. Abdominal:      General: Abdomen is flat. There is no distension. Palpations: Abdomen is soft. Tenderness: There is no abdominal tenderness. There is no guarding or rebound. Musculoskeletal:         General: Normal range of motion. Cervical back: Normal range of motion and neck supple. No rigidity. No muscular tenderness. Lymphadenopathy:      Cervical: No cervical adenopathy. Skin:     General: Skin is warm and dry. Capillary Refill: Capillary refill takes less than 2 seconds. Coloration: Skin is not jaundiced. Neurological:      General: No focal deficit present. Mental Status: She is alert and oriented to person, place, and time. Psychiatric:         Mood and Affect: Mood normal.         Behavior: Behavior normal.           MEDICAL DECISION MAKING   Initial Assessment: This is a 24-year-old female recent underwent a robotic laparoscopic cholecystectomy on 5/5/2021 and returns today due to having some constipation of the past 10 days as well as some burning epigastric pain that radiates up into her chest as well as nonradiating. Describes as being worse in the morning. Denies any shortness of breath fever chills cough or vomiting or blood in stool. But does mention having some nausea as well. Differential Diagnosis Included but not limited to: Gastritis, dyspepsia, GERD, constipation, pregnancy, pancreatitis    MDM: We will obtain laboratory studies including EKG troponin and chest x-ray.   Will give patient some Carafate GI cocktail with some NEGATIVE   Bilirubin, Urine NEGATIVE   Ketones, Urine NEGATIVE   Specific Gravity, Urine 1.009   Blood, Urine NEGATIVE   pH, UA 7.0   Protein, UA NEGATIVE   Urobilinogen, Urine 0.2   Nitrite, Urine NEGATIVE   Leukocyte Esterase, Urine MODERATE(!)   Color, UA YELLOW   Character, Urine CLEAR   RBC, UA 0-2   WBC, UA 5-9   Epithelial Cells, UA 3-5   Bacteria, UA NONE SEEN   Casts UA NONE SEEN   Crystals, UA NONE SEEN   Renal Epithelial, UA NONE SEEN   Yeast, Urine NONE SEEN   C... [AL]   1138 Mild leukocytosis most likely secondary,remainder is within normal limits   CBC Auto Differential(!):    WBC 12.0(!)   RBC 4.71   Hemoglobin Quant 13.6   Hematocrit 43.3   MCV 91.9   MCH 28.9   MCHC 31.4(!)   RDW-CV 12.6   RDW-SD 42.4   Platelet Count 715   MPV 9.1(!)   Seg Neutrophils 62.3   Lymphocytes 31.2   Monocytes 4.4   Eosinophils 1.4   Basophils 0.3   Immature Granulocytes 0.4   Segs Absolute 7.5   Lymphocytes Absolute 3.7   Monocytes Absolute 0.5   Eosinophils Absolute 0.2   Basophils Absolute 0.0   Immature Grans (Abs) 0.05   Nucleated Red Blood Cells 0 [AL]   1140 \"IMPRESSION:  Contrast throughout the colon no acute abdominal abnormality. No free air. \"   XR ACUTE ABD SERIES CHEST 1 VW [AL]   1747 Within normal limits. Hepatic Function Panel(!):    Albumin 4.2   Bilirubin <0.2(!)   Bilirubin, Direct <0.2   Alk Phos 80   AST 13   ALT 13   Total Protein 7.5 [AL]   1212 Troponin T: < 0.010 [AL]   1212 Osmolality Ca.0 [AL]   1212 Anion Gap: 11.0 [AL]   1212 Est, Glom Filt Rate(!): 85 [AL]   1212 Lipase: 41.3 [AL]   1212 Within normal limits. Basic Metabolic Panel w/ Reflex to MG:    Sodium 139   Potassium 4.0   Chloride 105   CO2 23   Glucose 90   BUN 7   Creatinine 0.8   Calcium 9.4 [AL]      ED Course User Index  [AL] Rodo Silva MD     Strict return precautions and follow up instructions were discussed with the patient prior to discharge, with which the patient agrees.       MEDICATION CHANGES     New Prescriptions    DOCUSATE SODIUM (COLACE) 100 MG CAPSULE    Take 1 capsule by mouth 2 times daily    SUCRALFATE (CARAFATE) 1 GM TABLET    Take 1 tablet by mouth 4 times daily         FINAL DISPOSITION     Final diagnoses:   Dyspepsia   Constipation, unspecified constipation type     Condition: condition: good  Dispo: Discharge to home      This transcription was electronically signed. Parts of this transcriptions may have been dictated by use of voice recognition software and electronically transcribed, and parts may have been transcribed with the assistance of an ED scribe. The transcription may contain errors not detected in proofreading. Please refer to my supervising physician's documentation if my documentation differs. Electronically Signed: Ryland Salinas MD, 05/27/21, 12:16 PM    Attending Supervising Physician's Attestation Statement  I performed a history and physical examination on the patient and discussed the management with the resident physician. I reviewed and agree with the findings and plan as documented in the resident physician note. This includes all diagnostic interpretations and treatment plans as written. I was present for the key portion of any procedures performed and the inclusive time noted in any critical care statement. I have reviewed and interpreted all available lab, radiology and ekg results available at the moment. Diagnosis, treatment and disposition plans were discussed and agreed upon by patient. Briefly patient presented for evaluation of constipation. She is Post OP debbie. She has some associated dyspepsia symptoms. Abd is soft NT without rigidity or guarding. AFVSS. Xray without obstruction. Patient to follow-up with her surgeon. This transcription was electronically signed. It was dictated by use of voice recognition software and electronically transcribed. The transcription may contain errors not detected in proofreading.        Electronically signed by Nancy Carlos,  on 5/27/21 at 10:31 PM EDT   Nancy Carlos,   05/27/21 1570 DO [place]  05/27/21 6036

## 2021-06-03 ENCOUNTER — HOSPITAL ENCOUNTER (OUTPATIENT)
Dept: GENERAL RADIOLOGY | Age: 26
Discharge: HOME | End: 2021-06-03
Payer: MEDICAID

## 2021-06-03 ENCOUNTER — HOSPITAL ENCOUNTER (OUTPATIENT)
Age: 26
Discharge: HOME | End: 2021-06-03
Payer: MEDICAID

## 2021-06-03 DIAGNOSIS — G89.29 CHRONIC LEFT SHOULDER PAIN: ICD-10-CM

## 2021-06-03 DIAGNOSIS — M25.512 CHRONIC LEFT SHOULDER PAIN: ICD-10-CM

## 2021-06-03 PROCEDURE — 73030 X-RAY EXAM OF SHOULDER: CPT

## 2021-07-16 ENCOUNTER — HOSPITAL ENCOUNTER (EMERGENCY)
Age: 26
Discharge: HOME | End: 2021-07-16
Payer: MEDICAID

## 2021-07-16 ENCOUNTER — APPOINTMENT (OUTPATIENT)
Dept: GENERAL RADIOLOGY | Age: 26
End: 2021-07-16
Payer: MEDICAID

## 2021-07-16 VITALS
TEMPERATURE: 98.7 F | HEIGHT: 63 IN | SYSTOLIC BLOOD PRESSURE: 114 MMHG | HEART RATE: 100 BPM | RESPIRATION RATE: 18 BRPM | DIASTOLIC BLOOD PRESSURE: 76 MMHG | BODY MASS INDEX: 44.3 KG/M2 | WEIGHT: 250 LBS | OXYGEN SATURATION: 96 %

## 2021-07-16 DIAGNOSIS — R10.9 CHRONIC ABDOMINAL PAIN: Primary | ICD-10-CM

## 2021-07-16 DIAGNOSIS — G89.29 CHRONIC ABDOMINAL PAIN: Primary | ICD-10-CM

## 2021-07-16 DIAGNOSIS — K59.00 CONSTIPATION, UNSPECIFIED CONSTIPATION TYPE: ICD-10-CM

## 2021-07-16 LAB
ALBUMIN SERPL-MCNC: 4 G/DL (ref 3.5–5.1)
ALP BLD-CCNC: 82 U/L (ref 38–126)
ALT SERPL-CCNC: 13 U/L (ref 11–66)
ANION GAP SERPL CALCULATED.3IONS-SCNC: 11 MEQ/L (ref 8–16)
AST SERPL-CCNC: 12 U/L (ref 5–40)
BACTERIA: ABNORMAL
BASOPHILS # BLD: 0.3 %
BASOPHILS ABSOLUTE: 0 THOU/MM3 (ref 0–0.1)
BILIRUB SERPL-MCNC: < 0.2 MG/DL (ref 0.3–1.2)
BILIRUBIN DIRECT: < 0.2 MG/DL (ref 0–0.3)
BILIRUBIN URINE: NEGATIVE
BLOOD, URINE: ABNORMAL
BUN BLDV-MCNC: 8 MG/DL (ref 7–22)
CALCIUM SERPL-MCNC: 9.4 MG/DL (ref 8.5–10.5)
CASTS: ABNORMAL /LPF
CASTS: ABNORMAL /LPF
CHARACTER, URINE: CLEAR
CHLORIDE BLD-SCNC: 104 MEQ/L (ref 98–111)
CO2: 22 MEQ/L (ref 23–33)
COLOR: YELLOW
CREAT SERPL-MCNC: 0.5 MG/DL (ref 0.4–1.2)
CRYSTALS: ABNORMAL
EOSINOPHIL # BLD: 2 %
EOSINOPHILS ABSOLUTE: 0.3 THOU/MM3 (ref 0–0.4)
EPITHELIAL CELLS, UA: ABNORMAL /HPF
ERYTHROCYTE [DISTWIDTH] IN BLOOD BY AUTOMATED COUNT: 13.1 % (ref 11.5–14.5)
ERYTHROCYTE [DISTWIDTH] IN BLOOD BY AUTOMATED COUNT: 43.7 FL (ref 35–45)
GFR SERPL CREATININE-BSD FRML MDRD: > 90 ML/MIN/1.73M2
GLUCOSE BLD-MCNC: 100 MG/DL (ref 70–108)
GLUCOSE, URINE: NEGATIVE MG/DL
HCT VFR BLD CALC: 39.4 % (ref 37–47)
HEMOGLOBIN: 12.7 GM/DL (ref 12–16)
IMMATURE GRANS (ABS): 0.09 THOU/MM3 (ref 0–0.07)
IMMATURE GRANULOCYTES: 0.6 %
KETONES, URINE: ABNORMAL
LEUKOCYTE ESTERASE, URINE: NEGATIVE
LIPASE: 47.8 U/L (ref 5.6–51.3)
LYMPHOCYTES # BLD: 26.5 %
LYMPHOCYTES ABSOLUTE: 3.9 THOU/MM3 (ref 1–4.8)
MCH RBC QN AUTO: 29.2 PG (ref 26–33)
MCHC RBC AUTO-ENTMCNC: 32.2 GM/DL (ref 32.2–35.5)
MCV RBC AUTO: 90.6 FL (ref 81–99)
MISCELLANEOUS LAB TEST RESULT: ABNORMAL
MONOCYTES # BLD: 5.1 %
MONOCYTES ABSOLUTE: 0.7 THOU/MM3 (ref 0.4–1.3)
NITRITE, URINE: NEGATIVE
NUCLEATED RED BLOOD CELLS: 0 /100 WBC
OSMOLALITY CALCULATION: 272.2 MOSMOL/KG (ref 275–300)
PH UA: 5.5 (ref 5–9)
PLATELET # BLD: 326 THOU/MM3 (ref 130–400)
PMV BLD AUTO: 9.5 FL (ref 9.4–12.4)
POTASSIUM SERPL-SCNC: 3.9 MEQ/L (ref 3.5–5.2)
PREGNANCY, SERUM: NEGATIVE
PROTEIN UA: NEGATIVE MG/DL
RBC # BLD: 4.35 MILL/MM3 (ref 4.2–5.4)
RBC URINE: ABNORMAL /HPF
RENAL EPITHELIAL, UA: ABNORMAL
SEG NEUTROPHILS: 65.5 %
SEGMENTED NEUTROPHILS ABSOLUTE COUNT: 9.6 THOU/MM3 (ref 1.8–7.7)
SODIUM BLD-SCNC: 137 MEQ/L (ref 135–145)
SPECIFIC GRAVITY UA: 1.02 (ref 1–1.03)
TOTAL PROTEIN: 6.9 G/DL (ref 6.1–8)
UROBILINOGEN, URINE: 0.2 EU/DL (ref 0–1)
WBC # BLD: 14.6 THOU/MM3 (ref 4.8–10.8)
WBC UA: ABNORMAL /HPF
YEAST: ABNORMAL

## 2021-07-16 PROCEDURE — 99283 EMERGENCY DEPT VISIT LOW MDM: CPT

## 2021-07-16 PROCEDURE — 36415 COLL VENOUS BLD VENIPUNCTURE: CPT

## 2021-07-16 PROCEDURE — 84703 CHORIONIC GONADOTROPIN ASSAY: CPT

## 2021-07-16 PROCEDURE — 83690 ASSAY OF LIPASE: CPT

## 2021-07-16 PROCEDURE — 74018 RADEX ABDOMEN 1 VIEW: CPT

## 2021-07-16 PROCEDURE — 81001 URINALYSIS AUTO W/SCOPE: CPT

## 2021-07-16 PROCEDURE — 85025 COMPLETE CBC W/AUTO DIFF WBC: CPT

## 2021-07-16 PROCEDURE — 80053 COMPREHEN METABOLIC PANEL: CPT

## 2021-07-16 PROCEDURE — 82248 BILIRUBIN DIRECT: CPT

## 2021-07-16 RX ORDER — LACTULOSE 10 G/10G
10 SOLUTION ORAL 2 TIMES DAILY
Qty: 28 PACKET | Refills: 0 | Status: SHIPPED | OUTPATIENT
Start: 2021-07-16 | End: 2021-07-30

## 2021-07-16 ASSESSMENT — ENCOUNTER SYMPTOMS
EYE DISCHARGE: 0
RHINORRHEA: 0
FACIAL SWELLING: 0
NAUSEA: 1
CHEST TIGHTNESS: 1
SINUS PAIN: 0
CONSTIPATION: 1
EYE REDNESS: 0
EYE ITCHING: 0
STRIDOR: 0
SHORTNESS OF BREATH: 1
SINUS PRESSURE: 0
WHEEZING: 0
BACK PAIN: 0
BLOOD IN STOOL: 0
COUGH: 1
VOMITING: 1
EYE PAIN: 0
DIARRHEA: 0
COLOR CHANGE: 0

## 2021-07-16 ASSESSMENT — PAIN DESCRIPTION - DESCRIPTORS: DESCRIPTORS: STABBING

## 2021-07-16 ASSESSMENT — PAIN SCALES - GENERAL: PAINLEVEL_OUTOF10: 7

## 2021-07-16 ASSESSMENT — PAIN DESCRIPTION - LOCATION: LOCATION: ABDOMEN

## 2021-07-16 ASSESSMENT — PAIN DESCRIPTION - ORIENTATION: ORIENTATION: LEFT;RIGHT;UPPER

## 2021-07-16 ASSESSMENT — PAIN DESCRIPTION - PAIN TYPE: TYPE: ACUTE PAIN

## 2021-07-16 NOTE — ED NOTES
Pt resting on cot in stable condition. Vitals remain stable.       Oliverio Appiah RN  07/16/21 9625

## 2021-07-16 NOTE — ED PROVIDER NOTES
[facility] Emergency Department    CHIEF COMPLAINT       Chief Complaint   Patient presents with    Abdominal Pain    Post-op Problem       Nurses Notes reviewed and I agree except as noted in the HPI. HISTORY OF PRESENT ILLNESS    José Miguel Garza is a 27 y.o. female who presents to the ED for evaluation of RUQ pain. She also complains of nausea, vomiting, constipation, cough, congestion, headache, chest pressure, and SOB. She was not vaccinated for COVID-19. She had similar complaints before her procedure and denies resolution of symptoms following her cholecystectomy. The patient reports that she is unable to keep water down. Her diet consists of fruits and vegetables. She denies the possibility of pregnancy and states that she takes her OCP as directed. Her last menstrual period was 4 days ago. She admits to a PMHx of GERD and polysubstance abuse. She denies hematemesis, diarrhea, fever, and fatigue. She notes decreased bowel movements since her cholecystectomy. Pain description:  Onset: May 5th, following her cholecystectomy   Location: RUQ  Duration: constant  Character: sharp  Aggravating factors: Worse when moving, better when still  Radiation: Denies  Severity: 7/10    HPI was provided by the patient. REVIEW OF SYSTEMS     Review of Systems   Constitutional: Negative for chills, diaphoresis, fatigue and fever. HENT: Positive for congestion. Negative for ear discharge, ear pain, facial swelling, hearing loss, nosebleeds, postnasal drip, rhinorrhea, sinus pressure, sinus pain and sneezing. Eyes: Negative for pain, discharge, redness, itching and visual disturbance. Respiratory: Positive for cough, chest tightness and shortness of breath. Negative for wheezing and stridor. Cardiovascular: Negative for chest pain. Gastrointestinal: Positive for constipation, nausea and vomiting. Negative for blood in stool and diarrhea.    Musculoskeletal: Negative for back pain, neck pain and neck stiffness. Skin: Negative for color change, pallor, rash and wound. Neurological: Positive for headaches. Negative for dizziness, syncope, facial asymmetry and speech difficulty. Psychiatric/Behavioral: Negative for agitation, behavioral problems, confusion, decreased concentration, dysphoric mood and hallucinations. The patient is not nervous/anxious and is not hyperactive. PAST MEDICAL HISTORY     Past Medical History:   Diagnosis Date    Arthritis     [facility] 1 disorder ([facility] [address]     Fibromyalgia     Pre-diabetes     Psychosis ([facility] [address]     Schizophrenia ([facility] [address]     Stomach pain        SURGICALHISTORY      has a past surgical history that includes [place] tooth extraction; EGD (02/2021); Colonoscopy (02/2021); and Cholecystectomy, laparoscopic (N/A, 5/5/2021). CURRENT MEDICATIONS       Discharge Medication List as of 7/16/2021  3:57 PM      CONTINUE these medications which have NOT CHANGED    Details   cloZAPine (CLOZARIL) 25 MG tablet Take 25 mg by mouth dailyHistorical Med      amitriptyline (ELAVIL) 50 MG tablet Historical Med      busPIRone (BUSPAR) 15 MG tablet Historical Med      SPRINTEC 28 0.25-35 MG-MCG per tablet DAWHistorical Med      docusate sodium (COLACE) 100 MG capsule Take 1 capsule by mouth 2 times daily, Disp-30 capsule, R-0Normal      sucralfate (CARAFATE) 1 GM tablet Take 1 tablet by mouth 4 times daily, Disp-120 tablet, R-3Normal      carBAMazepine (TEGRETOL) 200 MG tablet Take 200 mg by mouth 3 times daily NOT TAKINGHistorical Med      MARCELLO 3-0.02 MG per tablet DAWHistorical Med      traZODone (DESYREL) 100 MG tablet Historical Med      pantoprazole (PROTONIX) 40 MG tablet Take 1 tablet by mouth every morning (before breakfast), Disp-30 tablet, R-6Normal      clonazePAM (KLONOPIN) 0.5 MG tablet Take 0.5 mg by mouth 2 times daily as needed. Historical Med      paliperidone palmitate ER (INVEGA SUSTENNA) 234 MG/1.5ML FABIANA IM injection Inject into the muscle every 21 daysHistorical Med      trihexyphenidyl (ARTANE) 5 MG tablet Take 5 mg by mouth 2 times daily NOT TAKINGHistorical Med      albuterol sulfate HFA (PROVENTIL HFA) 108 (90 Base) MCG/ACT inhaler Inhale 2 puffs into the lungs every 4 hours as needed for Wheezing or Shortness of Breath (Space out to every 6 hours as symptoms improve) Space out to every 6 hours as symptoms improve., Disp-1 Inhaler,R-0Normal             ALLERGIES     has No Known Allergies. FAMILY HISTORY     She indicated that her mother is alive. She indicated that her father is alive. She indicated that the status of her neg hx is unknown.   family history is not on file. SOCIAL HISTORY       Social History     Socioeconomic History    Marital status: Single     Spouse name: Not on file    Number of children: Not on file    Years of education: Not on file    Highest education level: Not on file   Occupational History    Not on file   Tobacco Use    Smoking status: Current Every Day Smoker     Packs/day: 2.00     Types: Cigarettes    Smokeless tobacco: Never Used   Vaping Use    Vaping Use: Never used   Substance and Sexual Activity    Alcohol use: Not Currently    Drug use: No    Sexual activity: Yes     Partners: Male   Other Topics Concern    Not on file   Social History Narrative    Not on file     Social Determinants of Health     Financial Resource Strain: Low Risk     Difficulty of Paying Living Expenses: Not hard at all   Food Insecurity: No Food Insecurity    Worried About Running Out of Food in the Last Year: Never true    Brady of Food in the Last Year: Never true   Transportation Needs: No Transportation Needs    Lack of Transportation (Medical): No    Lack of Transportation (Non-Medical):  No   Physical Activity:     Days of Exercise per Week:     Minutes of Exercise per Session:    Stress:     Feeling of Stress :    Social Connections:     Frequency of Communication with Friends and Family:     Frequency of Social Gatherings with Friends and Family:     Attends [employer] Services:     Active Member of Clubs or Organizations:     Attends Club or Organization Meetings:     Marital Status:    Intimate Partner Violence:     Fear of Current or Ex-Partner:     Emotionally Abused:     Physically Abused:     Sexually Abused:        PHYSICAL EXAM     INITIAL VITALS:  height is 5' 3\" (1.6 m) and weight is 250 lb (113.4 kg). Her oral temperature is 98.7 °F (37.1 °C). Her blood pressure is 114/76 and her pulse is 100. Her respiration is 18 and oxygen saturation is 96%. Physical Exam  Constitutional:       General: She is not in acute distress. Appearance: She is obese. She is not ill-appearing, toxic-appearing or diaphoretic. HENT:      Head: Normocephalic and atraumatic. Eyes:      General: No scleral icterus. Extraocular Movements: Extraocular movements intact. Cardiovascular:      Rate and Rhythm: Tachycardia present. Heart sounds: Normal heart sounds. No friction rub. Pulmonary:      Effort: Pulmonary effort is normal. No respiratory distress. Breath sounds: Normal breath sounds. Abdominal:      Palpations: Abdomen is soft. There is no hepatomegaly or splenomegaly. Tenderness: There is abdominal tenderness in the right upper quadrant. There is no guarding or rebound. Negative signs include McBurney's sign. Hernia: No hernia is present. Skin:     General: Skin is warm and dry. Coloration: Skin is not cyanotic or jaundiced. Neurological:      General: No focal deficit present. Mental Status: She is alert and oriented to person, place, and time.    Psychiatric:         Mood and Affect: Mood normal.         Behavior: Behavior normal.         DIFFERENTIAL DIAGNOSIS:   Constipation, bile leak, peritonitis, chronic abdominal pain  DIAGNOSTIC RESULTS       RADIOLOGY: non-plainfilm images(s) such as CT, Ultrasound and MRI are read by the radiologist.  Plain radiographic images are visualized and preliminarily interpreted by the emergency physician unless otherwise stated below. XR ABDOMEN (KUB) (SINGLE AP VIEW)   Final Result   Constipation. No acute findings. **This report has been created using voice recognition software. It may contain minor errors which are inherent in voice recognition technology. **         Final report electronically signed by Dr. Tamika Garner on 7/16/2021 3:34 PM            LABS:   Labs Reviewed   CBC WITH AUTO DIFFERENTIAL - Abnormal; Notable for the following components:       Result Value    WBC 14.6 (*)     Segs Absolute 9.6 (*)     Immature Grans (Abs) 0.09 (*)     All other components within normal limits   BASIC METABOLIC PANEL - Abnormal; Notable for the following components:    CO2 22 (*)     All other components within normal limits   HEPATIC FUNCTION PANEL - Abnormal; Notable for the following components: Total Bilirubin <0.2 (*)     All other components within normal limits   URINALYSIS WITH MICROSCOPIC - Abnormal; Notable for the following components:    Ketones, Urine TRACE (*)     Blood, Urine SMALL (*)     All other components within normal limits   OSMOLALITY - Abnormal; Notable for the following components:    Osmolality Calc 272.2 (*)     All other components within normal limits   LIPASE   HCG, SERUM, QUALITATIVE   ANION GAP   GLOMERULAR FILTRATION RATE, ESTIMATED       EMERGENCY DEPARTMENT COURSE:   Vitals:    Vitals:    07/16/21 1338 07/16/21 1434 07/16/21 1512   BP: (!) 155/97 132/76 114/76   Pulse: 116 102 100   Resp: 18 18 18   Temp: 98.7 °F (37.1 °C)     TempSrc: Oral     SpO2: 96% 96% 96%   Weight: 250 lb (113.4 kg)     Height: 5' 3\" (1.6 m)         MDM    Patient was seen and evaluated in the emergency department, patient appeared to be in no acute distress, vital signs reviewed, hypertension was noted. Physical exam was completed, no significant findings noted.   Minimal tenderness in the right upper quadrant not consistent with anything in particular. Labs were obtained, no significant findings noted. KUB obtained shows constipation. Likely the cause of her symptoms. She notes she has tried MiraLAX with no improvement. We will place her on lactulose. Advised to follow-up with her general surgeon for repeat evaluation. She verbalized understanding plan of care. Medications - No data to display    Patient was seenindependently by myself. The patient's final impression and disposition and plan was determined by myself. CRITICAL CARE:   None    CONSULTS:  None    PROCEDURES:  None    FINAL IMPRESSION     1. Chronic abdominal pain    2. Constipation, unspecified constipation type          DISPOSITION/PLAN   Patient discharged in stable condition    PATIENT REFERREDTO:  John Aquino MD  [address] [address] [facility].  [facility]. [address] [place] [address]  [phone]    Call   For follow up and evaluation      DISCHARGE MEDICATIONS:  Discharge Medication List as of 7/16/2021  3:57 PM      START taking these medications    Details   lactulose (CEPHULAC) 10 g packet Take 1 packet by mouth 2 times daily for 14 days, Disp-28 packet, R-0Normal             (Please note that portions of this note were completed with a voice recognition program.  Efforts were made to edit the dictations but occasionally words are mis-transcribed.)    Provider:  I personally performed the services described in the documentation,reviewed and edited the documentation which was dictated to the scribe in my presence, and it accurately records my words and actions.     Ceferino Enamorado CNP 07/16/21 9:15 PM    Zelda Enamorado, APRN - CNP         Oversi, NAMAN - CNP  07/16/21 2434

## 2021-07-16 NOTE — ED TRIAGE NOTES
Pt presents to the ED by EMS with c/c upper abdominal pain following gallbladder removal on May 5th. Pt states since her surgery, she hasn't been able to keep anything down and she has been having uncontrollable pain. Pt denies any blood in urine or stool. States she ha been having issues with constipation. Pt rates pain 7/10 at this time all across the top of her abdomen. States it is a stabbing sensation. Vitals stable. Dr. Claude Comber performed the surgery.

## 2021-08-25 ENCOUNTER — OFFICE VISIT (OUTPATIENT)
Dept: INTERNAL MEDICINE CLINIC | Age: 26
End: 2021-08-25
Payer: MEDICAID

## 2021-08-25 VITALS — HEIGHT: 63 IN | BODY MASS INDEX: 46.25 KG/M2 | WEIGHT: 261 LBS | TEMPERATURE: 96.5 F

## 2021-08-25 DIAGNOSIS — E66.01 MORBID OBESITY (HCC): ICD-10-CM

## 2021-08-25 PROCEDURE — 97802 MEDICAL NUTRITION INDIV IN: CPT | Performed by: DIETITIAN, REGISTERED

## 2021-08-25 NOTE — PATIENT INSTRUCTIONS
Eat 3 meals/day:  Breakfast, Lunch and Supper. Ok to have a snack in the evening - 1 cup cereal OR other idea on your handout. Have 3 carb servings @ each meal + add protein + add non-starchy veggies. - follow the picture of the plate. Drink water most of the time. Sugar free drinks are ok too. Try the you tube chair exercises or other exercises that you can do and do this everyday. Bring a 1 week food log to next dietitian appt. Thanks.

## 2021-08-25 NOTE — PROGRESS NOTES
[address] [place] [address]. [address] [place] [address]., [facility], [facility]  [phone] (phone)  [phone] (fax)    Patient Name: Kelvin Martínez. Date of Birth: [id number]. MRN: [id number]      Assessment: Patient is a 27 y.o. female seen for Initial MNT visit for Low Fat Diet and Weight Loss. Hx. Of Cholecystectomy.     -Nutritionally relevant labs:   Lab Results   Component Value Date/Time    GLUCOSE 100 07/16/2021 02:33 PM    GLUCOSE 90 05/27/2021 11:23 AM     GB removed May 5th. Lives in Group home  Has been eating veggie burgers for past 4-5 weeks. Was having nausea    -Food recall:   Up @ 8 am.  Breakfast: Skips - Coffee with pwd cream and no sugar. 2-3 cups/day   Am snack:  orange  Lunch: 11 - Noon - veggie burger and 2 slices bread, coke zero sometimes. Dinner: 5 pm - veggie burger with 2 sl bread, apple juice - unsw. Snacks: In the evening - bowl of cereal - Frosted flakes. Right before going to bed.    -Main Beverages: no milk. Water, juice and coffee. Not exercising - use to take walks. Difficulty swallowing - esophagram was negative. Drinks water with her veggie burger.    -Impression of Dietary Intake: Unhealthy, 2 meals/day, low fiber. Current Outpatient Medications on File Prior to Visit   Medication Sig Dispense Refill    cloZAPine (CLOZARIL) 25 MG tablet Take 25 mg by mouth 2 times daily       amitriptyline (ELAVIL) 50 MG tablet       busPIRone (BUSPAR) 15 MG tablet       SPRINTEC 28 0.25-35 MG-MCG per tablet       docusate sodium (COLACE) 100 MG capsule Take 1 capsule by mouth 2 times daily 30 capsule 0    sucralfate (CARAFATE) 1 GM tablet Take 1 tablet by mouth 4 times daily 120 tablet 3    clonazePAM (KLONOPIN) 0.5 MG tablet Take 0.5 mg by mouth 2 times daily as needed.       paliperidone palmitate ER (INVEGA SUSTENNA) 234 MG/1.5ML FABIANA IM injection Inject into the muscle every 21 days      carBAMazepine (TEGRETOL) 200 MG tablet Take 200 mg by mouth 3 times daily NOT TAKING (Patient not taking: Reported on 8/25/2021)      MARCELLO 3-0.02 MG per tablet  (Patient not taking: Reported on 8/25/2021)      traZODone (DESYREL) 100 MG tablet  (Patient not taking: Reported on 8/25/2021)      pantoprazole (PROTONIX) 40 MG tablet Take 1 tablet by mouth every morning (before breakfast) (Patient not taking: Reported on 8/25/2021) 30 tablet 6    trihexyphenidyl (ARTANE) 5 MG tablet Take 5 mg by mouth 2 times daily NOT TAKING (Patient not taking: Reported on 8/25/2021)      albuterol sulfate HFA (PROVENTIL HFA) 108 (90 Base) MCG/ACT inhaler Inhale 2 puffs into the lungs every 4 hours as needed for Wheezing or Shortness of Breath (Space out to every 6 hours as symptoms improve) Space out to every 6 hours as symptoms improve. (Patient not taking: Reported on 8/25/2021) 1 Inhaler 0     No current facility-administered medications on file prior to visit. Vitals from current and previous visits:  Temp 96.5 °F (35.8 °C)   Ht 5' 3\" (1.6 m)   Wt 261 lb (118.4 kg)   BMI 46.23 kg/m²     -Body mass index is 46.23 kg/m². Greater than 40 - Morbid Obesity / Extreme Obesity / Grade III. -Weight goal: lose weight. Nutrition Diagnosis:   Obesity related to Lack of previous MNT/currently undergoing MNT as evidenced by Body mass index is 46.23 kg/m². .         Intervention:  -Impression: Med hx includes IGT. -Instructed the patient on: Simple Carbohydrate Counting, Consistent Carbohydrate Intake, Meal Planning for Regular, Balanced Meals & Snacks, Plate Method and The Importance of Regular Physical Activity    -Handouts given for: plate method - pictures with portions listed on handout of the carb containing foods, fancy plate pictures, healthy snack pictures, sample menu, healthy brfk pictures, you tube video exercises    Patient Instructions   Eat 3 meals/day:  Breakfast, Lunch and Supper.   Ok to have a snack in the evening - 1 cup cereal OR

## 2021-09-03 ENCOUNTER — HOSPITAL ENCOUNTER (OUTPATIENT)
Age: 26
Discharge: HOME | End: 2021-09-03
Payer: MEDICAID

## 2021-09-03 LAB
ANION GAP SERPL CALCULATED.3IONS-SCNC: 9 MEQ/L (ref 8–16)
BUN BLDV-MCNC: 9 MG/DL (ref 7–22)
CALCIUM SERPL-MCNC: 9.6 MG/DL (ref 8.5–10.5)
CHLORIDE BLD-SCNC: 105 MEQ/L (ref 98–111)
CO2: 24 MEQ/L (ref 23–33)
CREAT SERPL-MCNC: 0.6 MG/DL (ref 0.4–1.2)
ERYTHROCYTE [DISTWIDTH] IN BLOOD BY AUTOMATED COUNT: 12.8 % (ref 11.5–14.5)
ERYTHROCYTE [DISTWIDTH] IN BLOOD BY AUTOMATED COUNT: 43.9 FL (ref 35–45)
GFR SERPL CREATININE-BSD FRML MDRD: > 90 ML/MIN/1.73M2
GLUCOSE BLD-MCNC: 76 MG/DL (ref 70–108)
HCT VFR BLD CALC: 44.3 % (ref 37–47)
HEMOGLOBIN: 13.7 GM/DL (ref 12–16)
MCH RBC QN AUTO: 29.1 PG (ref 26–33)
MCHC RBC AUTO-ENTMCNC: 30.9 GM/DL (ref 32.2–35.5)
MCV RBC AUTO: 94.1 FL (ref 81–99)
PLATELET # BLD: 415 THOU/MM3 (ref 130–400)
PMV BLD AUTO: 9.3 FL (ref 9.4–12.4)
POTASSIUM SERPL-SCNC: 4.2 MEQ/L (ref 3.5–5.2)
RBC # BLD: 4.71 MILL/MM3 (ref 4.2–5.4)
SODIUM BLD-SCNC: 138 MEQ/L (ref 135–145)
WBC # BLD: 16 THOU/MM3 (ref 4.8–10.8)

## 2021-09-03 PROCEDURE — 93005 ELECTROCARDIOGRAM TRACING: CPT | Performed by: STUDENT IN AN ORGANIZED HEALTH CARE EDUCATION/TRAINING PROGRAM

## 2021-09-03 PROCEDURE — 80048 BASIC METABOLIC PNL TOTAL CA: CPT

## 2021-09-03 PROCEDURE — 36415 COLL VENOUS BLD VENIPUNCTURE: CPT

## 2021-09-03 PROCEDURE — 85027 COMPLETE CBC AUTOMATED: CPT

## 2021-09-04 LAB
EKG ATRIAL RATE: 97 BPM
EKG P AXIS: 55 DEGREES
EKG P-R INTERVAL: 138 MS
EKG Q-T INTERVAL: 360 MS
EKG QRS DURATION: 90 MS
EKG QTC CALCULATION (BAZETT): 457 MS
EKG R AXIS: 51 DEGREES
EKG T AXIS: 25 DEGREES
EKG VENTRICULAR RATE: 97 BPM

## 2021-09-04 PROCEDURE — 93010 ELECTROCARDIOGRAM REPORT: CPT | Performed by: NUCLEAR MEDICINE

## 2021-09-07 ENCOUNTER — TELEPHONE (OUTPATIENT)
Dept: CARDIOLOGY CLINIC | Age: 26
End: 2021-09-07

## 2021-09-10 ENCOUNTER — OFFICE VISIT (OUTPATIENT)
Dept: CARDIOLOGY CLINIC | Age: 26
End: 2021-09-10
Payer: MEDICAID

## 2021-09-10 VITALS
SYSTOLIC BLOOD PRESSURE: 122 MMHG | HEIGHT: 63 IN | BODY MASS INDEX: 46.07 KG/M2 | WEIGHT: 260 LBS | DIASTOLIC BLOOD PRESSURE: 68 MMHG | HEART RATE: 96 BPM

## 2021-09-10 DIAGNOSIS — Z01.810 PREOP CARDIOVASCULAR EXAM: Primary | ICD-10-CM

## 2021-09-10 PROCEDURE — 4004F PT TOBACCO SCREEN RCVD TLK: CPT | Performed by: INTERNAL MEDICINE

## 2021-09-10 PROCEDURE — G8417 CALC BMI ABV UP PARAM F/U: HCPCS | Performed by: INTERNAL MEDICINE

## 2021-09-10 PROCEDURE — 99204 OFFICE O/P NEW MOD 45 MIN: CPT | Performed by: INTERNAL MEDICINE

## 2021-09-10 PROCEDURE — G8427 DOCREV CUR MEDS BY ELIG CLIN: HCPCS | Performed by: INTERNAL MEDICINE

## 2021-09-10 RX ORDER — PROPRANOLOL HYDROCHLORIDE 20 MG/1
20 TABLET ORAL 2 TIMES DAILY
COMMUNITY
End: 2021-10-27

## 2021-09-10 NOTE — PROGRESS NOTES
NPO after midnight except for sip of water with heart/BP meds  Follow instructions given by surgeon including medications to hold   Bring insurance card and photo ID  Shower morning of surgery with liquid antibacterial soap  Wear loose comfortable clothing  Remove jewelry and do not bring valuables  Bring list of medications with dosages and how often taken if not reviewed with PAT    needed at discharge at ase 23years old  Call PAT at [phone] for questions

## 2021-09-10 NOTE — PROGRESS NOTES
[address]  [place] [address]  Dept: [phone]  Dept Fax: [phone]  Loc: [phone]    Visit Date: 9/10/2021    Ms. Jeff Guzman is a 27 y.o. female  who presented for:  No chief complaint on file. HPI:     Alayna Burciaga is a 28 yo female presenting to the clinic for pre-operative clearance for foot surgery next Friday with Podiatry. Patient states she has history of tachycardia for several years. States her resting heart rate was 120 on Tuesday. PCP put patient on propranolol 20mg BID for 2 weeks, but patient states she was taken off as heart rate not reduced, but patient state she continues to take her propranolol as her half way house requires her to take it as it is still on her medi set, she has one pill left. Patient sleep interrupted by symptoms of shortness of breath. Patient reports dizzyness in the mornings immediately after standing getting out of bed. Patient also reports palpitations \"all day, every day. \" Patient states she can feel her heart racing in her chest. Occasionally patient feels chest pain in her whole chest and upper stomach, even at rest. Chest pain worse on activity, improved with rest or \"laying down. \" Patient states she has anxiety and is anxious all the time. Smokes: 1 PPD for 7-8 years  Alcohol: non  Prior use of K2 and meth, been sober for several years. Denies cocaine and other illicit drug use. Cholecystectomy May 5, 2021  Surgery planned 2021 with podiatry for extra bone removal.     Uncle  at 39 due to heart attack  Father has heart condition he has been hospitalized for, patient unsure as to what cardiac condition he has.       Current Outpatient Medications:     cloZAPine (CLOZARIL) 25 MG tablet, Take 25 mg by mouth 2 times daily , Disp: , Rfl:     amitriptyline (ELAVIL) 50 MG tablet, , Disp: , Rfl:     busPIRone (BUSPAR) 15 MG tablet, , Disp: , Rfl:     SPRINTEC 28 0.25-35 MG-MCG per tablet, , Disp: , Rfl:     sucralfate (CARAFATE) 1 GM tablet, Take 1 tablet by mouth 4 times daily, Disp: 120 tablet, Rfl: 3    pantoprazole (PROTONIX) 40 MG tablet, Take 1 tablet by mouth every morning (before breakfast), Disp: 30 tablet, Rfl: 6    clonazePAM (KLONOPIN) 0.5 MG tablet, Take 0.5 mg by mouth 2 times daily as needed. , Disp: , Rfl:     paliperidone palmitate ER (INVEGA SUSTENNA) 234 MG/1.5ML FABIANA IM injection, Inject into the muscle every 21 days, Disp: , Rfl:     Past Medical History  Dameon lorenzana  has a past medical history of Arthritis, Bipolar 1 disorder ([facility] Fibromyalgia, Pre-diabetes, Psychosis ([facility] Schizophrenia ([facility] and Stomach pain. Social History  Dameon lorenzana  reports that she has been smoking cigarettes. She has been smoking about 2.00 packs per day. She has never used smokeless tobacco. She reports previous alcohol use. She reports that she does not use drugs. Family History  Dameon lorenzana family history is not on file. Past Surgical History   Past Surgical History:   Procedure Laterality Date    CHOLECYSTECTOMY, LAPAROSCOPIC N/A 5/5/2021    ROBOTIC CHOLECYSTECTOMY performed by Suzan Alonzo MD at [address] [place] [address]  02/2021    Dr. Jaziel Vance EGD  02/2021    Jaziel Vance WISDOM TOOTH EXTRACTION         Subjective:     REVIEW OF SYSTEMS  Constitutional: Does have sweats. Denies chills and fever  HENT: denies  congestion, sinus pressure, sneezing and sore throat. Eyes: denies  pain, discharge, redness and itching. Respiratory: SOB denies apnea, cough  Gastrointestinal: denies blood in stool, constipation, diarrhea   Endocrine: denies cold intolerance, heat intolerance, polydipsia. Genitourinary: denies dysuria, enuresis, flank pain and hematuria. Musculoskeletal: denies arthralgias, joint swelling and neck pain. Neurological: denies numbness and headaches.    Psychiatric/Behavioral: denies agitation, confusion, decreased concentration and dysphoric mood    All others reviewed and are negative. Objective:     /68   Pulse 96   Ht 5' 3\" (1.6 m)   Wt 260 lb (117.9 kg)   BMI 46.06 kg/m²     Wt Readings from Last 3 Encounters:   09/10/21 260 lb (117.9 kg)   08/25/21 261 lb (118.4 kg)   07/16/21 250 lb (113.4 kg)     BP Readings from Last 3 Encounters:   09/10/21 122/68   07/16/21 114/76   07/13/21 131/78       PHYSICAL EXAM  Constitutional: Oriented to person, place, and time. Appears well-developed and well-nourished. HENT:   Head: Normocephalic and atraumatic. Eyes: EOM are normal. Pupils are equal, round, and reactive to light. Neck: Normal range of motion. Neck supple. No JVD present. Cardiovascular: Normal rate , normal heart sounds and intact distal pulses. Pulmonary/Chest: Effort normal and breath sounds normal. No respiratory distress. No wheezes. No rales. Abdominal: Soft. Bowel sounds are normal. No distension. There is no tenderness. Musculoskeletal: Normal range of motion. No edema. Neurological: Alert and oriented to person, place, and time. No cranial nerve deficit. Coordination normal.   Skin: Skin is warm and dry. Psychiatric: Normal mood and affect.        No results found for: CKTOTAL, CKMB, CKMBINDEX    Lab Results   Component Value Date    WBC 16.0 09/03/2021    RBC 4.71 09/03/2021    HGB 13.7 09/03/2021    HCT 44.3 09/03/2021    MCV 94.1 09/03/2021    MCH 29.1 09/03/2021    MCHC 30.9 09/03/2021     09/03/2021    MPV 9.3 09/03/2021       Lab Results   Component Value Date     09/03/2021    K 4.2 09/03/2021    K 4.0 05/27/2021     09/03/2021    CO2 24 09/03/2021    BUN 9 09/03/2021    LABALBU 4.0 07/16/2021    CREATININE 0.6 09/03/2021    CALCIUM 9.6 09/03/2021    LABGLOM >90 09/03/2021    GLUCOSE 76 09/03/2021       Lab Results   Component Value Date    ALKPHOS 82 07/16/2021    ALT 13 07/16/2021    AST 12 07/16/2021    PROT 6.9 07/16/2021    BILITOT <0.2 07/16/2021

## 2021-09-10 NOTE — PROGRESS NOTES
Pt is here for: NP referral Dr. Fercho Bolaños cardiac clearance. For left foot, shaving down the bone and giving an injection to make the tendon grow.    09/17/2021

## 2021-09-15 ENCOUNTER — OFFICE VISIT (OUTPATIENT)
Dept: INTERNAL MEDICINE CLINIC | Age: 26
End: 2021-09-15
Payer: MEDICAID

## 2021-09-15 VITALS — WEIGHT: 266.8 LBS | HEIGHT: 63 IN | TEMPERATURE: 97.2 F | BODY MASS INDEX: 47.27 KG/M2

## 2021-09-15 DIAGNOSIS — E66.9 OBESITY, UNSPECIFIED CLASSIFICATION, UNSPECIFIED OBESITY TYPE, UNSPECIFIED WHETHER SERIOUS COMORBIDITY PRESENT: ICD-10-CM

## 2021-09-15 PROCEDURE — 97803 MED NUTRITION INDIV SUBSEQ: CPT | Performed by: DIETITIAN, REGISTERED

## 2021-09-15 NOTE — PROGRESS NOTES
FABIANA IM injection Inject into the muscle every 21 days       No current facility-administered medications on file prior to visit. Vitals from current and previous visits:  Temp 97.2 °F (36.2 °C)   Ht 5' 3\" (1.6 m)   Wt 266 lb 12.8 oz (121 kg)   BMI 47.26 kg/m²     -Body mass index is 47.26 kg/m². Greater than 40 - Morbid Obesity / Extreme Obesity / Grade III. - Patient gained and 6 pounds over the last 3 weeks. ..  -Weight goal: lose weight. Nutrition Diagnosis:   Obesity related to Learning disability/cognitive limitations and currently undergoing MNT as evidenced by Weight gain of 6# in 3 weeks and pt stating not exercising and excessive snacking at night. Intervention:  -Impression: Pt sitting in chair rubbing the arms of the chair which shows nervousness. Patient Instructions   Cut down on the amount of cereal you eat at night. Use a small bowl instead of a large bowl. Great job eating veggies - cut back on ranch dip. Include fresh fruit too with meals or have as a snack. Drink 5 bottles of water/day and instead of Big Red have a sugar free pop. Do chair exercises everyday - work up to 30 minutes/day of doing these - you can break up this time if you want - Example:  10 min in the morning and 10 minutes in the afternoon and 10 minutes in the evening. Bring a 1 week food log to next dietitian appt. -Nutrition prescription: 1500 - 1600 calories/day, 150 - 180 g carbs/day. 151# (69 kg)    Comprehension verified using teachback method. Monitoring/Evaluation:   -Followup visit: 10 weeks with dietitian.   -Receptiveness to education/goals: Agreeable.  -Evaluation of education: Needs reinforcement.  -Readiness to change: precontemplative- doing chair exercises as she will be healing from foot surgery. -Expected compliance: fair. Thank you for your referral of this patient. Total time involved in direct patient education: 15 minutes for follow-up MNT visit.

## 2021-09-15 NOTE — PATIENT INSTRUCTIONS
Cut down on the amount of cereal you eat at night. Use a small bowl instead of a large bowl. Great job eating veggies - cut back on ranch dip. Include fresh fruit too with meals or have as a snack. Drink 5 bottles of water/day and instead of Big Red have a sugar free pop. Do chair exercises everyday - work up to 30 minutes/day of doing these - you can break up this time if you want - Example:  10 min in the morning and 10 minutes in the afternoon and 10 minutes in the evening. Bring a 1 week food log to next dietitian appt.

## 2021-09-20 ENCOUNTER — APPOINTMENT (OUTPATIENT)
Dept: GENERAL RADIOLOGY | Age: 26
End: 2021-09-20
Attending: STUDENT IN AN ORGANIZED HEALTH CARE EDUCATION/TRAINING PROGRAM
Payer: MEDICAID

## 2021-09-20 ENCOUNTER — ANESTHESIA EVENT (OUTPATIENT)
Dept: OPERATING ROOM | Age: 26
End: 2021-09-20
Payer: MEDICAID

## 2021-09-20 ENCOUNTER — ANESTHESIA (OUTPATIENT)
Dept: OPERATING ROOM | Age: 26
End: 2021-09-20
Payer: MEDICAID

## 2021-09-20 ENCOUNTER — HOSPITAL ENCOUNTER (OUTPATIENT)
Age: 26
Setting detail: OUTPATIENT SURGERY
Discharge: HOME | End: 2021-09-20
Attending: STUDENT IN AN ORGANIZED HEALTH CARE EDUCATION/TRAINING PROGRAM | Admitting: STUDENT IN AN ORGANIZED HEALTH CARE EDUCATION/TRAINING PROGRAM
Payer: MEDICAID

## 2021-09-20 VITALS
BODY MASS INDEX: 47.48 KG/M2 | WEIGHT: 268 LBS | RESPIRATION RATE: 18 BRPM | DIASTOLIC BLOOD PRESSURE: 73 MMHG | HEART RATE: 123 BPM | TEMPERATURE: 97.2 F | OXYGEN SATURATION: 94 % | SYSTOLIC BLOOD PRESSURE: 125 MMHG | HEIGHT: 63 IN

## 2021-09-20 VITALS — DIASTOLIC BLOOD PRESSURE: 73 MMHG | OXYGEN SATURATION: 91 % | TEMPERATURE: 98.4 F | SYSTOLIC BLOOD PRESSURE: 125 MMHG

## 2021-09-20 DIAGNOSIS — G89.18 POST-OP PAIN: Primary | ICD-10-CM

## 2021-09-20 LAB — PREGNANCY, URINE: NEGATIVE

## 2021-09-20 PROCEDURE — 2580000003 HC RX 258: Performed by: STUDENT IN AN ORGANIZED HEALTH CARE EDUCATION/TRAINING PROGRAM

## 2021-09-20 PROCEDURE — 2709999900 HC NON-CHARGEABLE SUPPLY: Performed by: STUDENT IN AN ORGANIZED HEALTH CARE EDUCATION/TRAINING PROGRAM

## 2021-09-20 PROCEDURE — 7100000010 HC PHASE II RECOVERY - FIRST 15 MIN: Performed by: STUDENT IN AN ORGANIZED HEALTH CARE EDUCATION/TRAINING PROGRAM

## 2021-09-20 PROCEDURE — 2500000003 HC RX 250 WO HCPCS: Performed by: NURSE ANESTHETIST, CERTIFIED REGISTERED

## 2021-09-20 PROCEDURE — 6360000002 HC RX W HCPCS: Performed by: NURSE ANESTHETIST, CERTIFIED REGISTERED

## 2021-09-20 PROCEDURE — 2720000010 HC SURG SUPPLY STERILE: Performed by: STUDENT IN AN ORGANIZED HEALTH CARE EDUCATION/TRAINING PROGRAM

## 2021-09-20 PROCEDURE — 6360000002 HC RX W HCPCS: Performed by: STUDENT IN AN ORGANIZED HEALTH CARE EDUCATION/TRAINING PROGRAM

## 2021-09-20 PROCEDURE — 81025 URINE PREGNANCY TEST: CPT

## 2021-09-20 PROCEDURE — 7100000000 HC PACU RECOVERY - FIRST 15 MIN: Performed by: STUDENT IN AN ORGANIZED HEALTH CARE EDUCATION/TRAINING PROGRAM

## 2021-09-20 PROCEDURE — 3600000013 HC SURGERY LEVEL 3 ADDTL 15MIN: Performed by: STUDENT IN AN ORGANIZED HEALTH CARE EDUCATION/TRAINING PROGRAM

## 2021-09-20 PROCEDURE — 6370000000 HC RX 637 (ALT 250 FOR IP)

## 2021-09-20 PROCEDURE — 73620 X-RAY EXAM OF FOOT: CPT

## 2021-09-20 PROCEDURE — 3209999900 FLUORO FOR SURGICAL PROCEDURES

## 2021-09-20 PROCEDURE — 3700000001 HC ADD 15 MINUTES (ANESTHESIA): Performed by: STUDENT IN AN ORGANIZED HEALTH CARE EDUCATION/TRAINING PROGRAM

## 2021-09-20 PROCEDURE — 2500000003 HC RX 250 WO HCPCS: Performed by: STUDENT IN AN ORGANIZED HEALTH CARE EDUCATION/TRAINING PROGRAM

## 2021-09-20 PROCEDURE — 7100000011 HC PHASE II RECOVERY - ADDTL 15 MIN: Performed by: STUDENT IN AN ORGANIZED HEALTH CARE EDUCATION/TRAINING PROGRAM

## 2021-09-20 PROCEDURE — 7100000001 HC PACU RECOVERY - ADDTL 15 MIN: Performed by: STUDENT IN AN ORGANIZED HEALTH CARE EDUCATION/TRAINING PROGRAM

## 2021-09-20 PROCEDURE — 3600000003 HC SURGERY LEVEL 3 BASE: Performed by: STUDENT IN AN ORGANIZED HEALTH CARE EDUCATION/TRAINING PROGRAM

## 2021-09-20 PROCEDURE — 3700000000 HC ANESTHESIA ATTENDED CARE: Performed by: STUDENT IN AN ORGANIZED HEALTH CARE EDUCATION/TRAINING PROGRAM

## 2021-09-20 PROCEDURE — C1713 ANCHOR/SCREW BN/BN,TIS/BN: HCPCS | Performed by: STUDENT IN AN ORGANIZED HEALTH CARE EDUCATION/TRAINING PROGRAM

## 2021-09-20 DEVICE — 4.5MM STRYKER REELX STT ANCHOR
Type: IMPLANTABLE DEVICE | Status: FUNCTIONAL
Brand: REELX

## 2021-09-20 RX ORDER — FENTANYL CITRATE 50 UG/ML
50 INJECTION, SOLUTION INTRAMUSCULAR; INTRAVENOUS EVERY 5 MIN PRN
Status: DISCONTINUED | OUTPATIENT
Start: 2021-09-20 | End: 2021-09-20 | Stop reason: HOSPADM

## 2021-09-20 RX ORDER — MIDAZOLAM HYDROCHLORIDE 1 MG/ML
INJECTION INTRAMUSCULAR; INTRAVENOUS PRN
Status: DISCONTINUED | OUTPATIENT
Start: 2021-09-20 | End: 2021-09-20 | Stop reason: SDUPTHER

## 2021-09-20 RX ORDER — SUCCINYLCHOLINE/SOD CL,ISO/PF 200MG/10ML
SYRINGE (ML) INTRAVENOUS PRN
Status: DISCONTINUED | OUTPATIENT
Start: 2021-09-20 | End: 2021-09-20 | Stop reason: SDUPTHER

## 2021-09-20 RX ORDER — SODIUM CHLORIDE 9 MG/ML
25 INJECTION, SOLUTION INTRAVENOUS PRN
Status: DISCONTINUED | OUTPATIENT
Start: 2021-09-20 | End: 2021-09-20 | Stop reason: HOSPADM

## 2021-09-20 RX ORDER — ONDANSETRON 2 MG/ML
4 INJECTION INTRAMUSCULAR; INTRAVENOUS EVERY 6 HOURS PRN
Status: DISCONTINUED | OUTPATIENT
Start: 2021-09-20 | End: 2021-09-20 | Stop reason: HOSPADM

## 2021-09-20 RX ORDER — PROPOFOL 10 MG/ML
INJECTION, EMULSION INTRAVENOUS PRN
Status: DISCONTINUED | OUTPATIENT
Start: 2021-09-20 | End: 2021-09-20 | Stop reason: SDUPTHER

## 2021-09-20 RX ORDER — ONDANSETRON 4 MG/1
4 TABLET, ORALLY DISINTEGRATING ORAL EVERY 8 HOURS PRN
Status: DISCONTINUED | OUTPATIENT
Start: 2021-09-20 | End: 2021-09-20 | Stop reason: HOSPADM

## 2021-09-20 RX ORDER — SODIUM CHLORIDE 9 MG/ML
INJECTION, SOLUTION INTRAVENOUS CONTINUOUS
Status: DISCONTINUED | OUTPATIENT
Start: 2021-09-20 | End: 2021-09-20 | Stop reason: HOSPADM

## 2021-09-20 RX ORDER — OXYCODONE HYDROCHLORIDE 5 MG/1
5 TABLET ORAL EVERY 4 HOURS PRN
Status: DISCONTINUED | OUTPATIENT
Start: 2021-09-20 | End: 2021-09-20 | Stop reason: HOSPADM

## 2021-09-20 RX ORDER — SODIUM CHLORIDE 9 MG/ML
INJECTION, SOLUTION INTRAVENOUS CONTINUOUS
Status: DISCONTINUED | OUTPATIENT
Start: 2021-09-20 | End: 2021-09-20 | Stop reason: DRUGHIGH

## 2021-09-20 RX ORDER — HYDROCODONE BITARTRATE AND ACETAMINOPHEN 5; 325 MG/1; MG/1
1 TABLET ORAL EVERY 4 HOURS PRN
Qty: 30 TABLET | Refills: 0 | Status: SHIPPED | OUTPATIENT
Start: 2021-09-20 | End: 2021-09-25

## 2021-09-20 RX ORDER — SODIUM CHLORIDE 0.9 % (FLUSH) 0.9 %
10 SYRINGE (ML) INJECTION PRN
Status: DISCONTINUED | OUTPATIENT
Start: 2021-09-20 | End: 2021-09-20 | Stop reason: HOSPADM

## 2021-09-20 RX ORDER — SODIUM CHLORIDE 0.9 % (FLUSH) 0.9 %
10 SYRINGE (ML) INJECTION EVERY 12 HOURS SCHEDULED
Status: DISCONTINUED | OUTPATIENT
Start: 2021-09-20 | End: 2021-09-20 | Stop reason: HOSPADM

## 2021-09-20 RX ORDER — DEXAMETHASONE SODIUM PHOSPHATE 10 MG/ML
INJECTION, EMULSION INTRAMUSCULAR; INTRAVENOUS PRN
Status: DISCONTINUED | OUTPATIENT
Start: 2021-09-20 | End: 2021-09-20 | Stop reason: SDUPTHER

## 2021-09-20 RX ORDER — OXYCODONE HYDROCHLORIDE 5 MG/1
10 TABLET ORAL EVERY 4 HOURS PRN
Status: DISCONTINUED | OUTPATIENT
Start: 2021-09-20 | End: 2021-09-20 | Stop reason: HOSPADM

## 2021-09-20 RX ORDER — FENTANYL CITRATE 50 UG/ML
INJECTION, SOLUTION INTRAMUSCULAR; INTRAVENOUS PRN
Status: DISCONTINUED | OUTPATIENT
Start: 2021-09-20 | End: 2021-09-20 | Stop reason: SDUPTHER

## 2021-09-20 RX ORDER — BUPIVACAINE HYDROCHLORIDE 5 MG/ML
INJECTION, SOLUTION PERINEURAL PRN
Status: DISCONTINUED | OUTPATIENT
Start: 2021-09-20 | End: 2021-09-20 | Stop reason: ALTCHOICE

## 2021-09-20 RX ORDER — IPRATROPIUM BROMIDE AND ALBUTEROL SULFATE 2.5; .5 MG/3ML; MG/3ML
SOLUTION RESPIRATORY (INHALATION)
Status: COMPLETED
Start: 2021-09-20 | End: 2021-09-20

## 2021-09-20 RX ORDER — LIDOCAINE HCL/PF 100 MG/5ML
SYRINGE (ML) INJECTION PRN
Status: DISCONTINUED | OUTPATIENT
Start: 2021-09-20 | End: 2021-09-20 | Stop reason: SDUPTHER

## 2021-09-20 RX ORDER — SODIUM CHLORIDE 0.9 % (FLUSH) 0.9 %
5-40 SYRINGE (ML) INJECTION EVERY 12 HOURS SCHEDULED
Status: DISCONTINUED | OUTPATIENT
Start: 2021-09-20 | End: 2021-09-20 | Stop reason: HOSPADM

## 2021-09-20 RX ORDER — HYDRALAZINE HYDROCHLORIDE 20 MG/ML
5 INJECTION INTRAMUSCULAR; INTRAVENOUS EVERY 10 MIN PRN
Status: DISCONTINUED | OUTPATIENT
Start: 2021-09-20 | End: 2021-09-20 | Stop reason: HOSPADM

## 2021-09-20 RX ORDER — ONDANSETRON 2 MG/ML
4 INJECTION INTRAMUSCULAR; INTRAVENOUS
Status: DISCONTINUED | OUTPATIENT
Start: 2021-09-20 | End: 2021-09-20 | Stop reason: HOSPADM

## 2021-09-20 RX ORDER — LABETALOL 20 MG/4 ML (5 MG/ML) INTRAVENOUS SYRINGE
5 4 TIMES DAILY PRN
Status: DISCONTINUED | OUTPATIENT
Start: 2021-09-20 | End: 2021-09-20 | Stop reason: HOSPADM

## 2021-09-20 RX ORDER — IPRATROPIUM BROMIDE AND ALBUTEROL SULFATE 2.5; .5 MG/3ML; MG/3ML
1 SOLUTION RESPIRATORY (INHALATION) ONCE
Status: COMPLETED | OUTPATIENT
Start: 2021-09-20 | End: 2021-09-20

## 2021-09-20 RX ORDER — MEPERIDINE HYDROCHLORIDE 25 MG/ML
12.5 INJECTION INTRAMUSCULAR; INTRAVENOUS; SUBCUTANEOUS EVERY 5 MIN PRN
Status: DISCONTINUED | OUTPATIENT
Start: 2021-09-20 | End: 2021-09-20 | Stop reason: HOSPADM

## 2021-09-20 RX ORDER — SODIUM CHLORIDE 0.9 % (FLUSH) 0.9 %
5-40 SYRINGE (ML) INJECTION PRN
Status: DISCONTINUED | OUTPATIENT
Start: 2021-09-20 | End: 2021-09-20 | Stop reason: HOSPADM

## 2021-09-20 RX ORDER — ROCURONIUM BROMIDE 10 MG/ML
INJECTION, SOLUTION INTRAVENOUS PRN
Status: DISCONTINUED | OUTPATIENT
Start: 2021-09-20 | End: 2021-09-20 | Stop reason: SDUPTHER

## 2021-09-20 RX ORDER — MORPHINE SULFATE 2 MG/ML
2 INJECTION, SOLUTION INTRAMUSCULAR; INTRAVENOUS EVERY 5 MIN PRN
Status: DISCONTINUED | OUTPATIENT
Start: 2021-09-20 | End: 2021-09-20 | Stop reason: HOSPADM

## 2021-09-20 RX ORDER — ONDANSETRON 2 MG/ML
INJECTION INTRAMUSCULAR; INTRAVENOUS PRN
Status: DISCONTINUED | OUTPATIENT
Start: 2021-09-20 | End: 2021-09-20 | Stop reason: SDUPTHER

## 2021-09-20 RX ORDER — CYCLOBENZAPRINE HCL 5 MG
5 TABLET ORAL 2 TIMES DAILY PRN
Qty: 10 TABLET | Refills: 0 | Status: SHIPPED | OUTPATIENT
Start: 2021-09-20 | End: 2021-09-30

## 2021-09-20 RX ORDER — DIPHENHYDRAMINE HYDROCHLORIDE 50 MG/ML
12.5 INJECTION INTRAMUSCULAR; INTRAVENOUS
Status: DISCONTINUED | OUTPATIENT
Start: 2021-09-20 | End: 2021-09-20 | Stop reason: HOSPADM

## 2021-09-20 RX ADMIN — FENTANYL CITRATE 50 MCG: 50 INJECTION, SOLUTION INTRAMUSCULAR; INTRAVENOUS at 15:03

## 2021-09-20 RX ADMIN — Medication 80 MG: at 14:39

## 2021-09-20 RX ADMIN — DEXAMETHASONE SODIUM PHOSPHATE 4 MG: 10 INJECTION, EMULSION INTRAMUSCULAR; INTRAVENOUS at 14:49

## 2021-09-20 RX ADMIN — SODIUM CHLORIDE: 9 INJECTION, SOLUTION INTRAVENOUS at 15:35

## 2021-09-20 RX ADMIN — Medication 160 MG: at 14:39

## 2021-09-20 RX ADMIN — IPRATROPIUM BROMIDE AND ALBUTEROL SULFATE 1 AMPULE: .5; 3 SOLUTION RESPIRATORY (INHALATION) at 16:25

## 2021-09-20 RX ADMIN — MIDAZOLAM 2 MG: 1 INJECTION INTRAMUSCULAR; INTRAVENOUS at 14:31

## 2021-09-20 RX ADMIN — Medication 300 MG: at 14:42

## 2021-09-20 RX ADMIN — PROPOFOL 200 MG: 10 INJECTION, EMULSION INTRAVENOUS at 14:39

## 2021-09-20 RX ADMIN — ONDANSETRON HYDROCHLORIDE 4 MG: 4 INJECTION, SOLUTION INTRAMUSCULAR; INTRAVENOUS at 15:32

## 2021-09-20 RX ADMIN — ROCURONIUM BROMIDE 5 MG: 10 INJECTION INTRAVENOUS at 14:39

## 2021-09-20 RX ADMIN — SODIUM CHLORIDE: 9 INJECTION, SOLUTION INTRAVENOUS at 14:02

## 2021-09-20 RX ADMIN — FENTANYL CITRATE 50 MCG: 50 INJECTION, SOLUTION INTRAMUSCULAR; INTRAVENOUS at 15:01

## 2021-09-20 RX ADMIN — FENTANYL CITRATE 100 MCG: 50 INJECTION, SOLUTION INTRAMUSCULAR; INTRAVENOUS at 14:39

## 2021-09-20 RX ADMIN — IPRATROPIUM BROMIDE AND ALBUTEROL SULFATE 1 AMPULE: 2.5; .5 SOLUTION RESPIRATORY (INHALATION) at 16:25

## 2021-09-20 ASSESSMENT — PULMONARY FUNCTION TESTS
PIF_VALUE: 29
PIF_VALUE: 16
PIF_VALUE: 31
PIF_VALUE: 17
PIF_VALUE: 16
PIF_VALUE: 16
PIF_VALUE: 28
PIF_VALUE: 28
PIF_VALUE: 17
PIF_VALUE: 17
PIF_VALUE: 1
PIF_VALUE: 28
PIF_VALUE: 16
PIF_VALUE: 17
PIF_VALUE: 1
PIF_VALUE: 28
PIF_VALUE: 1
PIF_VALUE: 16
PIF_VALUE: 29
PIF_VALUE: 16
PIF_VALUE: 2
PIF_VALUE: 25
PIF_VALUE: 17
PIF_VALUE: 28
PIF_VALUE: 17
PIF_VALUE: 16
PIF_VALUE: 28
PIF_VALUE: 26
PIF_VALUE: 16
PIF_VALUE: 16
PIF_VALUE: 2
PIF_VALUE: 17
PIF_VALUE: 28
PIF_VALUE: 17
PIF_VALUE: 17
PIF_VALUE: 27
PIF_VALUE: 17
PIF_VALUE: 29
PIF_VALUE: 17
PIF_VALUE: 0
PIF_VALUE: 27
PIF_VALUE: 28
PIF_VALUE: 17
PIF_VALUE: 17
PIF_VALUE: 16
PIF_VALUE: 28
PIF_VALUE: 3
PIF_VALUE: 28
PIF_VALUE: 17
PIF_VALUE: 7
PIF_VALUE: 17
PIF_VALUE: 17
PIF_VALUE: 16
PIF_VALUE: 16
PIF_VALUE: 2
PIF_VALUE: 27
PIF_VALUE: 16
PIF_VALUE: 17
PIF_VALUE: 28
PIF_VALUE: 1
PIF_VALUE: 28
PIF_VALUE: 17

## 2021-09-20 ASSESSMENT — PAIN - FUNCTIONAL ASSESSMENT: PAIN_FUNCTIONAL_ASSESSMENT: 0-10

## 2021-09-20 ASSESSMENT — PAIN SCALES - GENERAL
PAINLEVEL_OUTOF10: 0
PAINLEVEL_OUTOF10: 0

## 2021-09-20 NOTE — ANESTHESIA POSTPROCEDURE EVALUATION
Department of Anesthesiology  Postprocedure Note    Patient: Safia Jim  MRN: [id number]  YOB: 1995  Date of evaluation: 9/20/2021  Time:  5:36 PM     Procedure Summary     Date: 09/20/21 Room / Location: [address] [place] [address]    Anesthesia Start: 1432 Anesthesia Stop: 6377    Procedure: LEFT FOOT EXCISION OF ACCESSORY NAVICULAR WITH RECONSTRUCTION OF POSTERIOR TIBIAL TENDON (Left Foot) Diagnosis: (LEFT ACCESSORY NAVICULAR BONE,  FOOT PAIN, POSTERIOR TIBIAL TENDINITIS)    Surgeons: Michael Brown DPM Responsible Provider: Lindsay Zaman MD    Anesthesia Type: general ASA Status: 2          Anesthesia Type: general    Veronica Phase I: Veronica Score: 9    Veronica Phase II: Veronica Score: 10    Last vitals: Reviewed and per EMR flowsheets. Anesthesia Post Evaluation    Patient location during evaluation: PACU  Patient participation: complete - patient participated  Level of consciousness: awake and alert  Airway patency: patent  Nausea & Vomiting: no nausea and no vomiting  Complications: no  Cardiovascular status: hemodynamically stable  Respiratory status: acceptable and nasal cannula  Hydration status: euvolemic      [address]  POST-ANESTHESIA NOTE       Name:  Safia Jim                                         Age:  27 y.o.   MRN:  [id number]      Last Vitals:  /73   Pulse 123   Temp 97.2 °F (36.2 °C) (Temporal)   Resp 18   Ht 5' 3\" (1.6 m)   Wt 268 lb (121.6 kg)   LMP 08/09/2021   SpO2 94%   BMI 47.47 kg/m²   Patient Vitals for the past 4 hrs:   BP Temp Temp src Pulse Resp SpO2 Height Weight   09/20/21 1734 125/73 97.2 °F (36.2 °C) Temporal 123 18 94 %     09/20/21 1707 (!) 143/78   119 16 96 %     09/20/21 1640 121/70 97.1 °F (36.2 °C) Temporal 121 16 93 %     09/20/21 1625 138/80   118 23 91 %     09/20/21 1620 127/75   121 15 92 %     09/20/21 1615 131/78   119 27 90 %     09/20/21 1610 139/74   119 20 94 %     09/20/21 1605 (!) 141/79   116 26 96 %     09/20/21 1600 (!) 143/80   116 25 95 %     09/20/21 1555 138/85   120 28 90 %     09/20/21 1550 131/82   122 27 (!) 87 %     09/20/21 1540 131/83 97.8 °F (36.6 °C) Temporal 123 28 (!) 88 %     09/20/21 1420    119       09/20/21 1337 138/82 97.3 °F (36.3 °C) Temporal 123 16 97 % 5' 3\" (1.6 m) 268 lb (121.6 kg)       Level of Consciousness:  Awake    Respiratory:  Stable    Oxygen Saturation:  Stable    Cardiovascular:  Stable    Hydration:  Adequate    PONV:  Stable    Post-op Pain:  Adequate analgesia    Post-op Assessment:  No apparent anesthetic complications    Additional Follow-Up / Treatment / Comment:  None    Maya Hastings MD  September 20, 2021   5:36 PM

## 2021-09-20 NOTE — BRIEF OP NOTE
Brief Postoperative Note      Patient: Adrianne Romero  YOB: 1995  MRN: [id number]    Date of Procedure: 9/20/2021    Pre-Op Diagnosis: LEFT ACCESSORY NAVICULAR BONE,  FOOT PAIN, POSTERIOR TIBIAL TENDINITIS    Post-Op Diagnosis: Same       Procedure(s):  LEFT FOOT EXCISION OF ACCESSORY NAVICULAR WITH RECONSTRUCTION OF POSTERIOR TIBIAL TENDON    Surgeon(s):  Jennifer Echeverria DPM    Assistant:  Heri Rizo DPM PGY-2    Anesthesia: General    Estimated Blood Loss (mL): Minimal    Injectables: 30cc of 0.5% marcaine plain    Complications: None    Specimens:   * No specimens in log *    Implants:  Implant Name Type Inv. Item Serial No.  Lot No. LRB No. Used Action   ANCHOR SUT DIA4. 5MM KNOTLESS PEEK REELX STT  ANCHOR SUT DIA4. 5MM KNOTLESS PEEK REELX STT  [employer] [id number] Left 1 Implanted         Drains: * No LDAs found *    Findings: Consistent with diagnosis    Electronically signed by Heri Rizo DPM on 9/20/2021 at 3:54 PM

## 2021-09-20 NOTE — PROGRESS NOTES
Oriented to sds       18  Family updated to stay in room. Informed family to take belonging with them if they leave. Keep nothing of value in room unattendedpt was asked and agreed to first name and last initial being put on white boards. fall risks applied. SCD Applied to patient. Warming blanket applied to patient. Pt denies any abuse or thoughts of suicide.

## 2021-09-20 NOTE — H&P
[facility]  History and Physical Update    Pt Name: Lee Saldana  MRN: [id number]  YOB: 1995  Date of evaluation: 9/20/2021    I have examined the patient and reviewed the H&P/Consult and there are no changes to the patient or plans.       Jacquelin Reynoso DPM  Electronically signed 9/20/2021 at 2:15 PM

## 2021-09-20 NOTE — PROGRESS NOTES
Called Dr Katalina Hwang in regards to pt need crutches at discharge. Order received.  Informed him that pt would like a wheelchair order he said will need to call office boogie boles

## 2021-09-20 NOTE — PROGRESS NOTES
Called Dr New informed him of patient heart rate. Pt saw Dr Ayo Godoy for clearance. Dr New is ok with proceeding.

## 2021-09-20 NOTE — PROGRESS NOTES
1540  Pt. Awake and oriented on adm. To pacu. Left foot dressing intact and dry. Immobilizer boot intact. 1545  duoneb aerosol tx given. o2 discontinued. 1550  o2 sat < 90%. o2 per nasal cannula restarted at 2 liters. 1600  Pt. Resting quietly. Pt. States pain is 3-4 out of 10.  1635  pacu criteria met. Transfer to [facility].

## 2021-09-20 NOTE — OP NOTE
Operative Note      Patient: Tami Goode  YOB: 1995  MRN: [id number]    Date of Procedure: 9/20/2021    Pre-Op Diagnosis: LEFT ACCESSORY NAVICULAR BONE,  FOOT PAIN, POSTERIOR TIBIAL TENDINITIS    Post-Op Diagnosis: Same       Procedure(s):  LEFT FOOT EXCISION OF ACCESSORY NAVICULAR WITH RECONSTRUCTION OF POSTERIOR TIBIAL TENDON    Surgeon(s):  Litzy Maldonado DPM    Assistant:   * No surgical staff found *    Anesthesia: General    Estimated Blood Loss (mL): Minimal    Complications: None    Specimens:   * No specimens in log *    Implants:  Implant Name Type Inv. Item Serial No.  Lot No. LRB No. Used Action   ANCHOR SUT DIA4. 5MM KNOTLESS PEEK REELX STT  ANCHOR SUT DIA4. 5MM KNOTLESS PEEK REELX STT  [employer] ENDOSCOPY-[id number] Left 1 Implanted         Drains: * No LDAs found *    Findings: consistent with diagnosis     Detailed Description of Procedure: Indications: Patient is a 27 y.o. female with a chief complaint of left foot pain being treated for left foot accessory navicular. At this time all conservative options have been exhausted and surgical intervention is necessary. The procedure has been explained to the patient and they understand the risks, benefits and possible complications including bleeding, infection, dehiscence, anchor failure, recurrence, increased pain, CRPS, loss of limb, loss of life. No promises have been made as to surgical outcome. Procedure: The patient was transported from the pre-op holding to the operating room and placed in a supine position. A pneumatic thigh tourniquet was applied to the left thigh. A pre-operative injection of 30cc of 0.5% marcaine plain was injected into the left foot in a field block. The left foot was then prepped and draped in the normal aspetic manner. The left foot was then exsanguinated with an esmark and the tourniquet was inflated to 300 mmHg.     Attention was then directed to the medial aspect of the left foot.  A skin  marker was used to create initial incision approximately 4 cm in length. A  scalpel blade was then used to make a full thickness incision making sure to  keep the blade perpendicular to the skin on initial incision. Blunt  dissection was then carried down to the layer of the posterior tibial tendon  sheath. It is important to note that all small bleeding vessels were  properly cauterized using a Bovie. Following blunt dissection, a U-shaped  tenotomy was performed along the most distal aspect of the insertion site of  the tibialis posterior tendon on the medial aspect of the navicula. The  tendon itself was freed and noted at this time to have some  cartilaginous/osseous components within the tendon itself which were sharply  debrided and removed. A sagittal sawwere then used to create a  smooth medial surface to the navicular allowing for adequate visualization of  cancellous bone. At this time a fiber tape was then whip stitched onto the posterior tibial tendon. Under c arm a  hole was made into the navicular. A reelx anchor was then inserted into the navicular and adequate placement was note don c arm without violating the cortex of the navicular. The suture was tightened to bring the PT tendon to approximation with the navicular. Adequate position and p;lacement was then noted on c arm. The incision was flushed with copious amounts of sterile saline. The subcutaneous tissues were re-appoximated with 3-0 vicryl. The skin was closed using 4-0 monocryl and 3-0 prolene. The incision was dressed with adaptic, 4x4's, kerlix, etc.  The pneumatic thigh tourniquet was then deflated and an immediate hyperemic response was noted to all digits of the left foot. A CAM boot was then applied. The patient was transported to the PACU with VSS and NVS intact to all digits of the left foot. No complications were noted throughout the procedure.   The patient is to be discharged per PACU protocol.      Dr. Dioni Moulton DPM      Electronically signed by Iglesia Elmore DPM on 9/20/2021 at 3:51 PM

## 2021-09-20 NOTE — ANESTHESIA PRE PROCEDURE
Department of Anesthesiology  Preprocedure Note       Name:  Camden Denney   Age:  27 y.o.  :  1995                                          MRN:  [id number]         Date:  2021      Surgeon: Polo King):  Masha Marino DPM    Procedure: Procedure(s):  LEFT FOOT EXCISION OF ACCESSORY NAVICULAR WITH RECONSTRUCTION OF POSTERIOR TIBIAL TENDON    Medications prior to admission:   Prior to Admission medications    Medication Sig Start Date End Date Taking? Authorizing Provider   sucralfate (CARAFATE) 1 GM tablet Take 1 tablet by mouth 4 times daily 21  Yes NAMAN Izaguirre CNP   propranolol (INDERAL) 20 MG tablet Take 20 mg by mouth 2 times daily   Yes Historical Provider, MD   cloZAPine (CLOZARIL) 25 MG tablet Take 75 mg by mouth 2 times daily    Yes Historical Provider, MD   amitriptyline (ELAVIL) 50 MG tablet  21  Yes Historical Provider, MD   busPIRone (BUSPAR) 15 MG tablet  21  Yes Historical Provider, MD   [address] [place] [address] 0.25-35 MG-MCG per tablet  6/10/21  Yes Historical Provider, MD   pantoprazole (PROTONIX) 40 MG tablet Take 1 tablet by mouth every morning (before breakfast) 21  Yes NAMAN Izaguirre CNP   clonazePAM (KLONOPIN) 0.5 MG tablet Take 0.5 mg by mouth 2 times daily as needed.    Yes Historical Provider, MD   paliperidone palmitate ER (INVEGA SUSTENNA) 234 MG/1.5ML FABIANA IM injection Inject into the muscle every 21 days    Historical Provider, MD       Current medications:    Current Facility-Administered Medications   Medication Dose Route Frequency Provider Last Rate Last Admin    sodium chloride flush 0.9 % injection 10 mL  10 mL IntraVENous 2 times per day Lisa Perry DPM        sodium chloride flush 0.9 % injection 10 mL  10 mL IntraVENous PRN Candy Holly DPM        0.9 % sodium chloride infusion  25 mL IntraVENous PRN Lisa Perry DPM        ceFAZolin (ANCEF) 3000 mg in dextrose 5 % 100 mL IVPB  3,000 mg IntraVENous On Call to [facility] [address]           Allergies:  No Known Allergies    Problem List:    Patient Active Problem List   Diagnosis Code    Sch[facility]ia ([facility] F20.9    Tachycardia R00.0    Chest pain R07.9    Sepsis ([facility] A41.9    Leukocytosis D72.829    Normocytic anemia D64.9    Bilateral atelectasis M15.80    Metabolic acidosis P73.9    Atypical chest pain R07.89    Morbid obesity (HCC) E66.01    Bipolar 1 disorder (HCC) F31.9    Tobacco abuse Z72.0    BMI 40.0-44.9, adult (HCC) G73.62    Biliary colic E14.01       Past Medical History:        Diagnosis Date    Arthritis     Bipolar 1 disorder (HCC)     Fibromyalgia     PONV (postoperative nausea and vomiting)     Pre-diabetes     Psychosis (HCC)     Schizophrenia (HCC)     Stomach pain        Past Surgical History:        Procedure Laterality Date    CHOLECYSTECTOMY, LAPAROSCOPIC N/A 5/5/2021    ROBOTIC CHOLECYSTECTOMY performed by Rick Zhong MD at [address] [place] [address] Rd  02/2021    Dr. Mira Knutson EGD  02/2021    Mira Knutson WISDOM TOOTH EXTRACTION         Social History:    Social History     Tobacco Use    Smoking status: Current Every Day Smoker     Packs/day: 0.50     Types: Cigarettes    Smokeless tobacco: Never Used   Substance Use Topics    Alcohol use: Not Currently                                Ready to quit: Not Answered  Counseling given: Not Answered      Vital Signs (Current):   Vitals:    09/10/21 1432 09/20/21 1337   BP:  138/82   Pulse:  123   Resp:  16   Temp:  97.3 °F (36.3 °C)   TempSrc:  Temporal   SpO2:  97%   Weight: 260 lb (117.9 kg) 268 lb (121.6 kg)   Height: 5' 3\" (1.6 m) 5' 3\" (1.6 m)                                              BP Readings from Last 3 Encounters:   09/20/21 138/82   09/10/21 122/68   07/16/21 114/76       NPO Status:                                                                                 BMI:   Wt Readings from Last 3 Encounters:   09/20/21 268 lb (121.6 kg)   09/15/21 266 lb 12.8 oz (121 kg)   09/10/21 260 lb (117.9 kg)     Body mass index is 47.47 kg/m². CBC:   Lab Results   Component Value Date    WBC 16.0 09/03/2021    RBC 4.71 09/03/2021    HGB 13.7 09/03/2021    HCT 44.3 09/03/2021    MCV 94.1 09/03/2021     09/03/2021       CMP:   Lab Results   Component Value Date     09/03/2021    K 4.2 09/03/2021    K 4.0 05/27/2021     09/03/2021    CO2 24 09/03/2021    BUN 9 09/03/2021    CREATININE 0.6 09/03/2021    LABGLOM >90 09/03/2021    GLUCOSE 76 09/03/2021    PROT 6.9 07/16/2021    CALCIUM 9.6 09/03/2021    BILITOT <0.2 07/16/2021    ALKPHOS 82 07/16/2021    AST 12 07/16/2021    ALT 13 07/16/2021       POC Tests: No results for input(s): POCGLU, POCNA, POCK, POCCL, POCBUN, POCHEMO, POCHCT in the last 72 hours. Coags: No results found for: PROTIME, INR, APTT    HCG (If Applicable):   Lab Results   Component Value Date    PREGTESTUR negative 09/20/2021    PREGSERUM NEGATIVE 07/16/2021        ABGs: No results found for: PHART, PO2ART, ONY6UOX, NDW6GSY, BEART, F4XITDMM     Type & Screen (If Applicable):  No results found for: LABABO, LABRH    Drug/Infectious Status (If Applicable):  No results found for: HIV, HEPCAB    COVID-19 Screening (If Applicable):   Lab Results   Component Value Date    COVID19 Not Detected 04/29/2021           Anesthesia Evaluation    Airway: Mallampati: II        Dental:          Pulmonary:                              Cardiovascular:                      Neuro/Psych:   (+) neuromuscular disease:, psychiatric history:            GI/Hepatic/Renal:             Endo/Other:                     Abdominal:   (+) obese,           Vascular: Other Findings:             Anesthesia Plan      general     ASA 2       Induction: intravenous. Anesthetic plan and risks discussed with patient. Plan discussed with CRNA.                   Allen Hong MD   9/20/2021

## 2021-09-21 NOTE — PROGRESS NOTES
Asked pt if she started Lovenox injections. She said not yet. Stressed importance of taking shots as prescribed.

## 2021-10-27 ENCOUNTER — OFFICE VISIT (OUTPATIENT)
Dept: CARDIOLOGY CLINIC | Age: 26
End: 2021-10-27
Payer: MEDICAID

## 2021-10-27 VITALS
BODY MASS INDEX: 48.97 KG/M2 | HEIGHT: 63 IN | DIASTOLIC BLOOD PRESSURE: 102 MMHG | SYSTOLIC BLOOD PRESSURE: 141 MMHG | HEART RATE: 125 BPM | WEIGHT: 276.4 LBS

## 2021-10-27 DIAGNOSIS — R00.0 SINUS TACHYCARDIA: Primary | ICD-10-CM

## 2021-10-27 DIAGNOSIS — Z72.0 TOBACCO ABUSE: ICD-10-CM

## 2021-10-27 DIAGNOSIS — R00.2 INTERMITTENT PALPITATIONS: ICD-10-CM

## 2021-10-27 DIAGNOSIS — R07.89 ATYPICAL CHEST PAIN: ICD-10-CM

## 2021-10-27 DIAGNOSIS — R06.02 SOB (SHORTNESS OF BREATH) ON EXERTION: ICD-10-CM

## 2021-10-27 PROCEDURE — G8417 CALC BMI ABV UP PARAM F/U: HCPCS | Performed by: INTERNAL MEDICINE

## 2021-10-27 PROCEDURE — 4004F PT TOBACCO SCREEN RCVD TLK: CPT | Performed by: INTERNAL MEDICINE

## 2021-10-27 PROCEDURE — 93000 ELECTROCARDIOGRAM COMPLETE: CPT | Performed by: INTERNAL MEDICINE

## 2021-10-27 PROCEDURE — G8427 DOCREV CUR MEDS BY ELIG CLIN: HCPCS | Performed by: INTERNAL MEDICINE

## 2021-10-27 PROCEDURE — 99214 OFFICE O/P EST MOD 30 MIN: CPT | Performed by: INTERNAL MEDICINE

## 2021-10-27 PROCEDURE — G8484 FLU IMMUNIZE NO ADMIN: HCPCS | Performed by: INTERNAL MEDICINE

## 2021-10-27 RX ORDER — CLOZAPINE 100 MG/1
125 TABLET ORAL 2 TIMES DAILY
COMMUNITY
Start: 2021-10-27

## 2021-10-27 NOTE — PROGRESS NOTES
Chief Complaint   Patient presents with    New Patient       Saw dr. Janet Abrams for pre op eval sept    Came today for palpitation, arm numbness, sob  And dizziness for over 2 yrs after she started injection for schizophrenia once in 3 weeks    Palpitation - daily  Occasional cp, once a week, noted while siting- nothing make it better, sharp 6/10, 30 min for the last 3 yrs  Seen in hospital several times for cp none was found    Sob on exertion    Dizziness on strecthing    Sinus tachy for several months    Still get hallucination    EKG done today     Smoke 1ppd for 14 yrs    FHx  Father had heart issue -detail unknown        Past Surgical History:   Procedure Laterality Date    CHOLECYSTECTOMY, LAPAROSCOPIC N/A 5/5/2021    ROBOTIC CHOLECYSTECTOMY performed by Dagoberto Newsome MD at [place] [address]  02/2021    Dr. Delphine Ramos EGD  02/2021    [place]    FOOT TENDON SURGERY Left 9/20/2021    LEFT FOOT EXCISION OF ACCESSORY NAVICULAR WITH RECONSTRUCTION OF POSTERIOR TIBIAL TENDON performed by Aletha Field DPM at [address] EXTRACTION         No Known Allergies     Family History   Problem Relation Age of Onset    Colon Cancer Neg Hx     Colon Polyps Neg Hx     Esophageal Cancer Neg Hx         Social History     Socioeconomic History    Marital status: Single     Spouse name: Not on file    Number of children: Not on file    Years of education: Not on file    Highest education level: Not on file   Occupational History    Not on file   Tobacco Use    Smoking status: Current Every Day Smoker     Packs/day: 0.50     Types: Cigarettes    Smokeless tobacco: Never Used   Vaping Use    Vaping Use: Never used   Substance and Sexual Activity    Alcohol use: Not Currently    Drug use: No    Sexual activity: Yes     Partners: Male   Other Topics Concern    Not on file   Social History Narrative    Not on file     Social Determinants of Health     Financial Resource Strain:     Difficulty of Paying Living Expenses:    Food Insecurity:     Worried About [facility] in the Last Year:     [address] in the Last Year:    Transportation Needs:     Lack of Transportation (Medical):  Lack of Transportation (Non-Medical):    Physical Activity:     Days of Exercise per Week:     Minutes of Exercise per Session:    Stress:     Feeling of Stress :    Social Connections:     Frequency of Communication with Friends and Family:     Frequency of Social Gatherings with Friends and Family:     Attends Baptism Services:     Active Member of Clubs or Organizations:     Attends Club or Organization Meetings:     Marital Status:    Intimate Partner Violence:     Fear of Current or Ex-Partner:     Emotionally Abused:     Physically Abused:     Sexually Abused:        Current Outpatient Medications   Medication Sig Dispense Refill    cloZAPine (CLOZARIL) 100 MG tablet       ondansetron (ZOFRAN) 4 MG tablet Take 1 tablet by mouth every 12 hours as needed for Nausea or Vomiting 60 tablet 1    amitriptyline (ELAVIL) 50 MG tablet 50 mg nightly       busPIRone (BUSPAR) 15 MG tablet 15 mg 3 times daily       pantoprazole (PROTONIX) 40 MG tablet Take 1 tablet by mouth every morning (before breakfast) 30 tablet 6    clonazePAM (KLONOPIN) 0.5 MG tablet Take 0.5 mg by mouth 2 times daily as needed.  paliperidone palmitate ER (INVEGA SUSTENNA) 234 MG/1.5ML FABIANA IM injection Inject into the muscle every 21 days       No current facility-administered medications for this visit. Review of Systems -     General ROS: negative  Psychological ROS: negative  Hematological and Lymphatic ROS: No history of blood clots or bleeding disorder.    Respiratory ROS: no cough,  or wheezing, the rest see HPI  Cardiovascular ROS: See HPI  Gastrointestinal ROS: negative  Genito-Urinary ROS: no dysuria, trouble voiding, or hematuria  Musculoskeletal ROS: negative  Neurological ROS: no TIA or stroke symptoms  Dermatological ROS: negative      Blood pressure (!) 141/102, pulse 125, height 5' 3\" (1.6 m), weight 276 lb 6.4 oz (125.4 kg). Physical Examination:    General appearance - alert, well appearing, and in no distress  HEENT- Pink conjunctiva  , Non-icteri sclera,PERRLA  Mental status - alert, oriented to person, place, and time  Neck - supple, no significant adenopathy, no JVD, or carotid bruits  Chest - clear to auscultation, no wheezes, rales or rhonchi, symmetric air entry  Heart - normal rate, regular rhythm, normal S1, S2, no murmurs, rubs, clicks or gallops  Abdomen - soft, nontender, nondistended, no masses or organomegaly  CARROLL- no CVA or flank tenderness, no suprapubic tenderness  Neurological - alert, oriented, normal speech, no focal findings or movement disorder noted  Musculoskeletal/limbs - no joint tenderness, deformity or swelling   - peripheral pulses normal, no pedal edema, no clubbing or cyanosis  Skin - normal coloration and turgor, no rashes, no suspicious skin lesions noted  Psych- appropriate mood and affect    Lab  No results for input(s): CKTOTAL, CKMB, CKMBINDEX, TROPONINI in the last 72 hours.   CBC:   Lab Results   Component Value Date    WBC 15.2 10/26/2021    RBC 4.57 10/26/2021    HGB 13.4 10/26/2021    HCT 42.7 10/26/2021    MCV 93.4 10/26/2021    MCH 29.3 10/26/2021    MCHC 31.4 10/26/2021     10/26/2021    MPV 9.8 10/26/2021     BMP:    Lab Results   Component Value Date     09/03/2021    K 4.2 09/03/2021    K 4.0 05/27/2021     09/03/2021    CO2 24 09/03/2021    BUN 9 09/03/2021    LABALBU 4.0 07/16/2021    CREATININE 0.6 09/03/2021    CALCIUM 9.6 09/03/2021    LABGLOM >90 09/03/2021    GLUCOSE 110 10/26/2021     Hepatic Function Panel:    Lab Results   Component Value Date    ALKPHOS 82 07/16/2021    ALT 13 07/16/2021    AST 12 07/16/2021    PROT 6.9 07/16/2021    BILITOT <0.2 07/16/2021    BILIDIR <0.2 07/16/2021    LABALBU 4.0 07/16/2021 Magnesium:    Lab Results   Component Value Date    MG 1.8 04/27/2021     Warfarin PT/INR:  No components found for: PTPATWAR, PTINRWAR  HgBA1c:  No results found for: LABA1C  FLP:  No results found for: TRIG, HDL, LDLCALC, LDLDIRECT, LABVLDL  TSH:    Lab Results   Component Value Date    TSH 2.790 10/26/2021     ekg 10/27/21  Sinus  Tachycardia  125 bop  Low voltage in precordial leads. -RSR(V1) -nondiagnostic. ABNORMAL         Assessment   Diagnosis Orders   1. Sinus tachycardia  Holter monitor 48 hour    Echo 2D w doppler w color complete   2. Intermittent palpitations  Holter monitor 48 hour    Echo 2D w doppler w color complete   3. Atypical chest pain  Holter monitor 48 hour    Echo 2D w doppler w color complete   4. SOB (shortness of breath) on exertion  Holter monitor 48 hour    Echo 2D w doppler w color complete   5.  Tobacco abuse  Holter monitor 48 hour    Echo 2D w doppler w color complete     Hx of recent left foot surgery walk with crunches    Plan   TSH WNL oct 2021  Cbc and BMP wnl  Echo'  48 holter  Consider Coronary CTA R/O CAD and anomalous coronary for atypical cp  Consider lopressor or ivabardine after holter    D/w the pat the plan    RTC IN 2 weeks        [facility]

## 2021-11-02 ENCOUNTER — HOSPITAL ENCOUNTER (OUTPATIENT)
Dept: NON INVASIVE DIAGNOSTICS | Age: 26
Discharge: HOME | End: 2021-11-02
Payer: MEDICAID

## 2021-11-02 DIAGNOSIS — R06.02 SOB (SHORTNESS OF BREATH) ON EXERTION: ICD-10-CM

## 2021-11-02 DIAGNOSIS — Z72.0 TOBACCO ABUSE: ICD-10-CM

## 2021-11-02 DIAGNOSIS — R07.89 ATYPICAL CHEST PAIN: ICD-10-CM

## 2021-11-02 DIAGNOSIS — R00.2 INTERMITTENT PALPITATIONS: ICD-10-CM

## 2021-11-02 DIAGNOSIS — R00.0 SINUS TACHYCARDIA: ICD-10-CM

## 2021-11-02 LAB
LV EF: 58 %
LVEF MODALITY: NORMAL

## 2021-11-02 PROCEDURE — 93306 TTE W/DOPPLER COMPLETE: CPT

## 2021-11-16 ENCOUNTER — OFFICE VISIT (OUTPATIENT)
Dept: CARDIOLOGY CLINIC | Age: 26
End: 2021-11-16
Payer: MEDICAID

## 2021-11-16 ENCOUNTER — TELEPHONE (OUTPATIENT)
Dept: CARDIOLOGY CLINIC | Age: 26
End: 2021-11-16

## 2021-11-16 VITALS
SYSTOLIC BLOOD PRESSURE: 140 MMHG | BODY MASS INDEX: 50.68 KG/M2 | HEIGHT: 63 IN | HEART RATE: 132 BPM | DIASTOLIC BLOOD PRESSURE: 88 MMHG | WEIGHT: 286 LBS

## 2021-11-16 DIAGNOSIS — R07.89 ATYPICAL CHEST PAIN: ICD-10-CM

## 2021-11-16 DIAGNOSIS — R00.2 INTERMITTENT PALPITATIONS: ICD-10-CM

## 2021-11-16 DIAGNOSIS — Z72.0 TOBACCO ABUSE: ICD-10-CM

## 2021-11-16 DIAGNOSIS — R00.0 INAPPROPRIATE SINUS TACHYCARDIA: Primary | ICD-10-CM

## 2021-11-16 DIAGNOSIS — R06.02 SOB (SHORTNESS OF BREATH) ON EXERTION: ICD-10-CM

## 2021-11-16 PROBLEM — I47.11 INAPPROPRIATE SINUS TACHYCARDIA: Status: ACTIVE | Noted: 2021-11-16

## 2021-11-16 PROCEDURE — G8427 DOCREV CUR MEDS BY ELIG CLIN: HCPCS | Performed by: INTERNAL MEDICINE

## 2021-11-16 PROCEDURE — G8417 CALC BMI ABV UP PARAM F/U: HCPCS | Performed by: INTERNAL MEDICINE

## 2021-11-16 PROCEDURE — 4004F PT TOBACCO SCREEN RCVD TLK: CPT | Performed by: INTERNAL MEDICINE

## 2021-11-16 PROCEDURE — G8484 FLU IMMUNIZE NO ADMIN: HCPCS | Performed by: INTERNAL MEDICINE

## 2021-11-16 PROCEDURE — 99214 OFFICE O/P EST MOD 30 MIN: CPT | Performed by: INTERNAL MEDICINE

## 2021-11-16 RX ORDER — BENZTROPINE MESYLATE 0.5 MG/1
0.5 TABLET ORAL 2 TIMES DAILY
COMMUNITY

## 2021-11-16 RX ORDER — PRAZOSIN HYDROCHLORIDE 2 MG/1
2 CAPSULE ORAL
COMMUNITY
Start: 2021-11-08

## 2021-11-16 NOTE — PROGRESS NOTES
Chief Complaint   Patient presents with    Follow-up    Tachycardia       Saw dr. Michael Iglesias for pre op eval sept    2 WEEK FOLLOW UP. EKG DONE 10-.     CONT TO HAVE CP SHARP ONE, PALPITATIONS    NO LEG EDEMA    Came 10/27/21 for palpitation, arm numbness, sob  And dizziness for over 2 yrs after she started injection for schizophrenia once in 3 weeks    Palpitation - daily  Occasional cp, once a week, noted while siting- nothing make it better, sharp 6/10, 30 min for the last 3 yrs  Seen in hospital several times for cp none was found    Sob on exertion    Dizziness on strecthing    Sinus tachy for several months    Still get hallucination    EKG done today     Smoke 1ppd for 14 yrs    FHx  Father had heart issue -detail unknown        Past Surgical History:   Procedure Laterality Date    CHOLECYSTECTOMY, LAPAROSCOPIC N/A 5/5/2021    ROBOTIC CHOLECYSTECTOMY performed by Kalpana Charles MD at [facility]  02/2021    Dr. Kady Bill EGD  02/2021    [place]    FOOT TENDON SURGERY Left 9/20/2021    LEFT FOOT EXCISION OF ACCESSORY NAVICULAR WITH RECONSTRUCTION OF POSTERIOR TIBIAL TENDON performed by Jessica Mccormick DPM at [address] EXTRACTION         No Known Allergies     Family History   Problem Relation Age of Onset    Colon Cancer Neg Hx     Colon Polyps Neg Hx     Esophageal Cancer Neg Hx         Social History     Socioeconomic History    Marital status: Single     Spouse name: Not on file    Number of children: Not on file    Years of education: Not on file    Highest education level: Not on file   Occupational History    Not on file   Tobacco Use    Smoking status: Current Every Day Smoker     Packs/day: 0.50     Types: Cigarettes    Smokeless tobacco: Never Used   Vaping Use    Vaping Use: Never used   Substance and Sexual Activity    Alcohol use: Not Currently    Drug use: No    Sexual activity: Yes     Partners: Male   Other Topics Concern    Not on file   Social History Narrative    Not on file     Social Determinants of Health     Financial Resource Strain:     Difficulty of Paying Living Expenses: Not on file   Food Insecurity:     Worried About Running Out of Food in the Last Year: Not on file    Brady of Food in the Last Year: Not on file   Transportation Needs:     Lack of Transportation (Medical): Not on file    Lack of Transportation (Non-Medical):  Not on file   Physical Activity:     Days of Exercise per Week: Not on file    Minutes of Exercise per Session: Not on file   Stress:     Feeling of Stress : Not on file   Social Connections:     Frequency of Communication with Friends and Family: Not on file    Frequency of Social Gatherings with Friends and Family: Not on file    Attends Shinto Services: Not on file    Active Member of [address] [place] [address] or Organizations: Not on file    Attends Club or Organization Meetings: Not on file    Marital Status: Not on file   Intimate Partner Violence:     Fear of Current or Ex-Partner: Not on file    Emotionally Abused: Not on file    Physically Abused: Not on file    Sexually Abused: Not on file   Housing Stability:     Unable to Pay for Housing in the Last Year: Not on file    Number of [place] in the Last Year: Not on file    Unstable Housing in the Last Year: Not on file       Current Outpatient Medications   Medication Sig Dispense Refill    benztropine (COGENTIN) 0.5 MG tablet Take 0.5 mg by mouth 2 times daily      prazosin (MINIPRESS) 2 MG capsule Take 2 mg by mouth       metoprolol tartrate (LOPRESSOR) 25 MG tablet Take 1 tablet by mouth 2 times daily 60 tablet 5    ivabradine (CORLANOR) 5 MG TABS tablet Take 1 tablet by mouth 2 times daily (with meals) 60 tablet 3    cloZAPine (CLOZARIL) 100 MG tablet Take 125 mg by mouth 2 times daily       ondansetron (ZOFRAN) 4 MG tablet Take 1 tablet by mouth every 12 hours as needed for Nausea or Vomiting 60 tablet 1    amitriptyline (ELAVIL) 50 MG tablet 50 mg nightly       busPIRone (BUSPAR) 15 MG tablet 15 mg 3 times daily       pantoprazole (PROTONIX) 40 MG tablet Take 1 tablet by mouth every morning (before breakfast) 30 tablet 6    clonazePAM (KLONOPIN) 0.5 MG tablet Take 0.5 mg by mouth 2 times daily as needed.  paliperidone palmitate ER (INVEGA SUSTENNA) 234 MG/1.5ML FABIANA IM injection Inject into the muscle every 21 days       No current facility-administered medications for this visit. Review of Systems -     General ROS: negative  Psychological ROS: negative  Hematological and Lymphatic ROS: No history of blood clots or bleeding disorder. Respiratory ROS: no cough,  or wheezing, the rest see HPI  Cardiovascular ROS: See HPI  Gastrointestinal ROS: negative  Genito-Urinary ROS: no dysuria, trouble voiding, or hematuria  Musculoskeletal ROS: negative  Neurological ROS: no TIA or stroke symptoms  Dermatological ROS: negative      Blood pressure (!) 140/88, pulse 132, height 5' 3\" (1.6 m), weight 286 lb (129.7 kg).         Physical Examination:    General appearance - alert, well appearing, and in no distress  HEENT- Pink conjunctiva  , Non-icteri sclera,PERRLA  Mental status - alert, oriented to person, place, and time  Neck - supple, no significant adenopathy, no JVD, or carotid bruits  Chest - clear to auscultation, no wheezes, rales or rhonchi, symmetric air entry  Heart - normal rate, regular rhythm, normal S1, S2, no murmurs, rubs, clicks or gallops  Abdomen - soft, nontender, nondistended, no masses or organomegaly  CARROLL- no CVA or flank tenderness, no suprapubic tenderness  Neurological - alert, oriented, normal speech, no focal findings or movement disorder noted  Musculoskeletal/limbs - no joint tenderness, deformity or swelling   - peripheral pulses normal, no pedal edema, no clubbing or cyanosis  Skin - normal coloration and turgor, no rashes, no suspicious skin lesions noted  Psych- appropriate mood and affect    Lab  No results for input(s): CKTOTAL, CKMB, CKMBINDEX, TROPONINI in the last 72 hours. CBC:   Lab Results   Component Value Date    WBC 15.2 10/26/2021    RBC 4.57 10/26/2021    HGB 13.4 10/26/2021    HCT 42.7 10/26/2021    MCV 93.4 10/26/2021    MCH 29.3 10/26/2021    MCHC 31.4 10/26/2021     10/26/2021    MPV 9.8 10/26/2021     BMP:    Lab Results   Component Value Date     09/03/2021    K 4.2 09/03/2021    K 4.0 05/27/2021     09/03/2021    CO2 24 09/03/2021    BUN 9 09/03/2021    LABALBU 4.0 07/16/2021    CREATININE 0.6 09/03/2021    CALCIUM 9.6 09/03/2021    LABGLOM >90 09/03/2021    GLUCOSE 110 10/26/2021     Hepatic Function Panel:    Lab Results   Component Value Date    ALKPHOS 82 07/16/2021    ALT 13 07/16/2021    AST 12 07/16/2021    PROT 6.9 07/16/2021    BILITOT <0.2 07/16/2021    BILIDIR <0.2 07/16/2021    LABALBU 4.0 07/16/2021     Magnesium:    Lab Results   Component Value Date    MG 1.8 04/27/2021     Warfarin PT/INR:  No components found for: PTPATWAR, PTINRWAR  HgBA1c:  No results found for: LABA1C  FLP:  No results found for: TRIG, HDL, LDLCALC, LDLDIRECT, LABVLDL  TSH:    Lab Results   Component Value Date    TSH 2.790 10/26/2021       Conclusions      Summary   Normal left ventricle size and systolic function. Ejection fraction was   estimated at 55 to 60 %. There were no regional left ventricular wall   motion abnormalities and wall thickness was within normal limits.       Signature      ----------------------------------------------------------------   Electronically signed by Kenneth Neal MD (Interpreting   physician) on 11/02/2021 at 08:14 PM      50 HRS HOLTER  DIARY  Diary not completed     CONCLUSION:  *   Normal Sinus rhythm    Average Heart Rate 116 bp Range from  87 bpm to  153 bpm   Rare Premature atrial complexes -2  Rare Premature ventricular complexes -12  No long pause or profound bradycardia  No Supraventricular Tachycardia   No Atrial Fibrillation          Confirmed by Michael Pierson Charlie SCHMITZ ([id number]) on 11/7/2021 12:10:31 PM    tsh wnl 10.2021    ekg 10/27/21  Sinus  Tachycardia  125 bop  Low voltage in precordial leads. -RSR(V1) -nondiagnostic. ABNORMAL         Assessment   Diagnosis Orders   1. Inappropriate sinus tachycardia     2. Atypical chest pain     3. Intermittent palpitations     4. SOB (shortness of breath) on exertion     5.  Tobacco abuse       Hx of recent left foot surgery walk with crunches    Plan   TSH WNL oct 2021  Cbc and BMP wnl  Echo' WNL  48 holter- SINSU TACHY  START lopressor 25 BID  START ivabardine  5 BID    RECURRENT CP- ATYP[ICAL ONE  NEED Coronary CTA R/O CAD and anomalous coronary for atypical cp      D/w the pat the plan    ALL Q ANSWERED    RTC IN 2 MONTHS      Bassam Bingham, [facility]

## 2021-12-14 ENCOUNTER — HOSPITAL ENCOUNTER (OUTPATIENT)
Dept: CT IMAGING | Age: 26
Discharge: HOME | End: 2021-12-14
Payer: MEDICAID

## 2021-12-14 VITALS
OXYGEN SATURATION: 96 % | RESPIRATION RATE: 20 BRPM | SYSTOLIC BLOOD PRESSURE: 121 MMHG | HEART RATE: 94 BPM | DIASTOLIC BLOOD PRESSURE: 84 MMHG

## 2021-12-14 DIAGNOSIS — R00.2 INTERMITTENT PALPITATIONS: ICD-10-CM

## 2021-12-14 DIAGNOSIS — R00.0 INAPPROPRIATE SINUS TACHYCARDIA: ICD-10-CM

## 2021-12-14 DIAGNOSIS — R06.02 SOB (SHORTNESS OF BREATH) ON EXERTION: ICD-10-CM

## 2021-12-14 DIAGNOSIS — Z72.0 TOBACCO ABUSE: ICD-10-CM

## 2021-12-14 DIAGNOSIS — R07.89 ATYPICAL CHEST PAIN: ICD-10-CM

## 2021-12-14 PROCEDURE — 75574 CT ANGIO HRT W/3D IMAGE: CPT

## 2021-12-14 PROCEDURE — 2500000003 HC RX 250 WO HCPCS: Performed by: RADIOLOGY

## 2021-12-14 PROCEDURE — 6360000004 HC RX CONTRAST MEDICATION: Performed by: RADIOLOGY

## 2021-12-14 PROCEDURE — 6370000000 HC RX 637 (ALT 250 FOR IP): Performed by: RADIOLOGY

## 2021-12-14 RX ORDER — NITROGLYCERIN 0.4 MG/1
0.4 TABLET SUBLINGUAL ONCE
Status: COMPLETED | OUTPATIENT
Start: 2021-12-14 | End: 2021-12-14

## 2021-12-14 RX ORDER — NITROGLYCERIN 0.4 MG/1
0.4 TABLET SUBLINGUAL ONCE
Status: CANCELLED | OUTPATIENT
Start: 2021-12-14 | End: 2021-12-14

## 2021-12-14 RX ORDER — METOPROLOL TARTRATE 5 MG/5ML
5 INJECTION INTRAVENOUS EVERY 5 MIN PRN
Status: COMPLETED | OUTPATIENT
Start: 2021-12-14 | End: 2021-12-14

## 2021-12-14 RX ORDER — METOPROLOL TARTRATE 5 MG/5ML
5 INJECTION INTRAVENOUS EVERY 5 MIN PRN
Status: CANCELLED | OUTPATIENT
Start: 2021-12-14

## 2021-12-14 RX ADMIN — NITROGLYCERIN 0.4 MG: 0.4 TABLET, ORALLY DISINTEGRATING SUBLINGUAL at 13:13

## 2021-12-14 RX ADMIN — METOROPROLOL TARTRATE 5 MG: 5 INJECTION, SOLUTION INTRAVENOUS at 13:11

## 2021-12-14 RX ADMIN — METOROPROLOL TARTRATE 5 MG: 5 INJECTION, SOLUTION INTRAVENOUS at 13:01

## 2021-12-14 RX ADMIN — IOPAMIDOL 80 ML: 755 INJECTION, SOLUTION INTRAVENOUS at 13:16

## 2021-12-14 RX ADMIN — METOROPROLOL TARTRATE 5 MG: 5 INJECTION, SOLUTION INTRAVENOUS at 13:06

## 2021-12-14 NOTE — PROGRESS NOTES
200 Pt in CT scanning for CTA of coronary arteries. Explained procedure to pt and pt verbalizes understanding. 1249 Pt positioned on scanner and attached to monitor. 1250 Dr Clare Nj notified of pt's vitals. OK to proceed with scan. Pt denies any chest pain or shortness of breath. 1258 #22 angio started in [address] after 3 attempts. Flushes well. 1316 CTA complete. Pt tolerated well. 46 Pt assisted to recovery area with Aunt. Offers no complaints. 1336 Pt continues to offer no complaints. 1341 IV in RAC discontinued and pressure applied till bleeding stopped. Discharge instructions given to pt and pt verbalizes understanding. 1352 Pt discharged ambulatory with steady gait. Offers no complaints of chest pain or shortness of breath or dizziness.

## 2021-12-17 ENCOUNTER — HOSPITAL ENCOUNTER (OUTPATIENT)
Dept: DIABETES SERVICES | Age: 26
Setting detail: THERAPIES SERIES
Discharge: HOME | End: 2021-12-17
Payer: MEDICAID

## 2021-12-17 PROCEDURE — G0108 DIAB MANAGE TRN  PER INDIV: HCPCS

## 2021-12-17 SDOH — ECONOMIC STABILITY: FOOD INSECURITY: ADDITIONAL INFORMATION: NO

## 2021-12-17 ASSESSMENT — PROBLEM AREAS IN DIABETES QUESTIONNAIRE (PAID)
FEELING THAT DIABETES IS TAKING UP TOO MUCH OF YOUR MENTAL AND PHYSICAL ENERGY EVERY DAY: 4
FEELING DEPRESSED WHEN YOU THINK ABOUT LIVING WITH DIABETES: 0
WORRYING ABOUT THE FUTURE AND THE POSSIBILITY OF SERIOUS COMPLICATIONS: 4
PAID-5 TOTAL SCORE: 13
FEELING SCARED WHEN YOU THINK ABOUT LIVING WITH DIABETES: 1
COPING WITH COMPLICATIONS OF DIABETES: 4

## 2021-12-17 NOTE — LETTER
[facility]  [facility] [address]  [facility] [address]  Phone: [phone]         December 17, 2021    To NAMAN Rosas CNP  From: Leidy Jack RN    Patient: Melissa Hewitt   YOB: 1995   Date of Visit: 12/17/21     An initial assessment to determine diabetes education needs was completed. Education included:blood sugar goals, complications of diabetes mellitus, hypoglycemia prevention and treatment, exercise, self-monitoring of blood glucose skills and nutrition. An ongoing plan was created to include: individual follow up    Thank you for the opportunity to provide Diabetes Self Management Education to your patient. Melissa Hewitt completed a Diabetes Self- Management Education Assessment on 12/17/21. Part of our assessment is having the patient complete the PAID (Problem Areas in Diabetes Scale)-5 survey. This tool  measures diabetes-related emotional distress a patient may be feeling. Melissa Hewitt scored 13   A total score of >8 indicates possible diabetes related emotional distress, which warrants further assessment and a referral to mental health professional for psychological support and treatment.
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
## 2022-01-13 ENCOUNTER — HOSPITAL ENCOUNTER (OUTPATIENT)
Dept: DIABETES SERVICES | Age: 27
Setting detail: THERAPIES SERIES
Discharge: HOME | End: 2022-01-13
Payer: MEDICAID

## 2022-01-13 DIAGNOSIS — R73.09 IMPAIRED GLUCOSE TOLERANCE TEST: Primary | ICD-10-CM

## 2022-01-13 PROCEDURE — G0108 DIAB MANAGE TRN  PER INDIV: HCPCS
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
## 2022-01-18 ENCOUNTER — OFFICE VISIT (OUTPATIENT)
Dept: CARDIOLOGY CLINIC | Age: 27
End: 2022-01-18
Payer: MEDICAID

## 2022-01-18 VITALS
HEIGHT: 63 IN | DIASTOLIC BLOOD PRESSURE: 94 MMHG | HEART RATE: 104 BPM | SYSTOLIC BLOOD PRESSURE: 137 MMHG | BODY MASS INDEX: 51.45 KG/M2 | WEIGHT: 290.4 LBS

## 2022-01-18 DIAGNOSIS — R00.0 INAPPROPRIATE SINUS TACHYCARDIA: Primary | ICD-10-CM

## 2022-01-18 DIAGNOSIS — R00.2 INTERMITTENT PALPITATIONS: ICD-10-CM

## 2022-01-18 DIAGNOSIS — R07.89 ATYPICAL CHEST PAIN: ICD-10-CM

## 2022-01-18 DIAGNOSIS — Z72.0 TOBACCO ABUSE: ICD-10-CM

## 2022-01-18 PROCEDURE — G8427 DOCREV CUR MEDS BY ELIG CLIN: HCPCS | Performed by: INTERNAL MEDICINE

## 2022-01-18 PROCEDURE — 4004F PT TOBACCO SCREEN RCVD TLK: CPT | Performed by: INTERNAL MEDICINE

## 2022-01-18 PROCEDURE — G8484 FLU IMMUNIZE NO ADMIN: HCPCS | Performed by: INTERNAL MEDICINE

## 2022-01-18 PROCEDURE — 99214 OFFICE O/P EST MOD 30 MIN: CPT | Performed by: INTERNAL MEDICINE

## 2022-01-18 PROCEDURE — G8417 CALC BMI ABV UP PARAM F/U: HCPCS | Performed by: INTERNAL MEDICINE

## 2022-01-18 RX ORDER — METOPROLOL TARTRATE 37.5 MG/1
37.5 TABLET, FILM COATED ORAL 2 TIMES DAILY
Qty: 60 TABLET | Refills: 5 | Status: SHIPPED | OUTPATIENT
Start: 2022-01-18 | End: 2022-05-17

## 2022-01-18 NOTE — PROGRESS NOTES
Chief Complaint   Patient presents with    Follow-up       Saw dr. Cash Kwok for pre op eval BEFORE      ORIGINALLY Came 10/27/21 for palpitation, CP,arm numbness, sob  And dizziness for over 2 yrs after she started injection for schizophrenia once in 3 week        2 MONTH FOLLOW UP.    WAKING UP IN THE MIDDLE OF THE NIGHT AND CAN NOT CALM HERSELF DOWN    PALPITATION BETTER NOW USED TO BE DAILY  NOW ON [facility] DECLINED    DENIED SOB OR EDEMA    EKG DONE 10-. CONT TO HAVE CP SHARP -ATYPICAL ONCE IN A WHILE -CHONIC FOR OVER 3 YRS  Seen in hospital several times for cp none was found      Still get hallucination AND WAKING IN THE NIGHT WITH PANIC LIKE SITUATION  SEES DR. MYERS FOR PSYCH PROBLEMS    Smoke 1ppd for 14 yrs    FHx  Father had heart issue -detail unknown        Past Surgical History:   Procedure Laterality Date    CHOLECYSTECTOMY, LAPAROSCOPIC N/A 5/5/2021    ROBOTIC CHOLECYSTECTOMY performed by Atiya Chinchilla MD at [facility]  02/2021    Dr. Denys Rollins EGD  02/2021    [place]    FOOT TENDON SURGERY Left 9/20/2021    LEFT FOOT EXCISION OF ACCESSORY NAVICULAR WITH RECONSTRUCTION OF POSTERIOR TIBIAL TENDON performed by Meghan Mariscal DPM at [address] [place] [address] EXTRACTION         No Known Allergies     Family History   Problem Relation Age of Onset    Colon Cancer Neg Hx     Colon Polyps Neg Hx     Esophageal Cancer Neg Hx         Social History     Socioeconomic History    Marital status: Single     Spouse name: Not on file    Number of children: Not on file    Years of education: Not on file    Highest education level: Not on file   Occupational History    Not on file   Tobacco Use    Smoking status: Current Every Day Smoker     Packs/day: 1.00     Types: Cigarettes    Smokeless tobacco: Never Used   Vaping Use    Vaping Use: Never used   Substance and Sexual Activity    Alcohol use: Not Currently    Drug use: No    Sexual activity: Yes     Partners: Male Other Topics Concern    Not on file   Social History Narrative    Not on file     Social Determinants of Health     Financial Resource Strain:     Difficulty of Paying Living Expenses: Not on file   Food Insecurity:     Worried About Running Out of Food in the Last Year: Not on file    Brady of Food in the Last Year: Not on file   Transportation Needs:     Lack of Transportation (Medical): Not on file    Lack of Transportation (Non-Medical):  Not on file   Physical Activity:     Days of Exercise per Week: Not on file    Minutes of Exercise per Session: Not on file   Stress:     Feeling of Stress : Not on file   Social Connections:     Frequency of Communication with Friends and Family: Not on file    Frequency of Social Gatherings with Friends and Family: Not on file    Attends Mormon Services: Not on file    Active Member of [address] [place] [address] or Organizations: Not on file    Attends Club or Organization Meetings: Not on file    Marital Status: Not on file   Intimate Partner Violence:     Fear of Current or Ex-Partner: Not on file    Emotionally Abused: Not on file    Physically Abused: Not on file    Sexually Abused: Not on file   Housing Stability:     Unable to Pay for Housing in the Last Year: Not on file    Number of [place] in the Last Year: Not on file    Unstable Housing in the Last Year: Not on file       Current Outpatient Medications   Medication Sig Dispense Refill    dicyclomine (BENTYL) 10 MG capsule Take 1 capsule by mouth 4 times daily as needed (abdominal cramping) 120 capsule 3    benztropine (COGENTIN) 0.5 MG tablet Take 0.5 mg by mouth 2 times daily      prazosin (MINIPRESS) 2 MG capsule Take 2 mg by mouth       metoprolol tartrate (LOPRESSOR) 25 MG tablet Take 1 tablet by mouth 2 times daily 60 tablet 5    cloZAPine (CLOZARIL) 100 MG tablet Take 125 mg by mouth 2 times daily       ondansetron (ZOFRAN) 4 MG tablet Take 1 tablet by mouth every 12 hours as needed for Nausea or Vomiting 60 tablet 1    amitriptyline (ELAVIL) 50 MG tablet 50 mg nightly       busPIRone (BUSPAR) 15 MG tablet 15 mg 3 times daily       pantoprazole (PROTONIX) 40 MG tablet Take 1 tablet by mouth every morning (before breakfast) 30 tablet 6    clonazePAM (KLONOPIN) 0.5 MG tablet Take 0.5 mg by mouth 2 times daily as needed.  paliperidone palmitate ER (INVEGA SUSTENNA) 234 MG/1.5ML FABIANA IM injection Inject into the muscle every 21 days       No current facility-administered medications for this visit. Review of Systems -     General ROS: negative  Psychological ROS: negative  Hematological and Lymphatic ROS: No history of blood clots or bleeding disorder. Respiratory ROS: no cough,  or wheezing, the rest see HPI  Cardiovascular ROS: See HPI  Gastrointestinal ROS: negative  Genito-Urinary ROS: no dysuria, trouble voiding, or hematuria  Musculoskeletal ROS: negative  Neurological ROS: no TIA or stroke symptoms  Dermatological ROS: negative      Blood pressure (!) 143/95, pulse 106, height 5' 3\" (1.6 m), weight 290 lb 6.4 oz (131.7 kg).         Physical Examination:    General appearance - alert, well appearing, and in no distress  HEENT- Pink conjunctiva  , Non-icteri sclera,PERRLA  Mental status - alert, oriented to person, place, and time  Neck - supple, no significant adenopathy, no JVD, or carotid bruits  Chest - clear to auscultation, no wheezes, rales or rhonchi, symmetric air entry  Heart - normal rate, regular rhythm, normal S1, S2, no murmurs, rubs, clicks or gallops  Abdomen - soft, nontender, nondistended, no masses or organomegaly  CARROLL- no CVA or flank tenderness, no suprapubic tenderness  Neurological - alert, oriented, normal speech, no focal findings or movement disorder noted  Musculoskeletal/limbs - no joint tenderness, deformity or swelling   - peripheral pulses normal, no pedal edema, no clubbing or cyanosis  Skin - normal coloration and turgor, no rashes, no suspicious skin lesions noted  Psych- appropriate mood and affect    Lab  No results for input(s): CKTOTAL, CKMB, CKMBINDEX, TROPONINI in the last 72 hours. CBC:   Lab Results   Component Value Date    WBC 15.2 10/26/2021    RBC 4.57 10/26/2021    HGB 13.4 10/26/2021    HCT 42.7 10/26/2021    MCV 93.4 10/26/2021    MCH 29.3 10/26/2021    MCHC 31.4 10/26/2021     10/26/2021    MPV 9.8 10/26/2021     BMP:    Lab Results   Component Value Date     09/03/2021    K 4.2 09/03/2021    K 4.0 05/27/2021     09/03/2021    CO2 24 09/03/2021    BUN 9 09/03/2021    LABALBU 4.0 07/16/2021    CREATININE 0.6 09/03/2021    CALCIUM 9.6 09/03/2021    LABGLOM >90 09/03/2021    GLUCOSE 110 10/26/2021     Hepatic Function Panel:    Lab Results   Component Value Date    ALKPHOS 82 07/16/2021    ALT 13 07/16/2021    AST 12 07/16/2021    PROT 6.9 07/16/2021    BILITOT <0.2 07/16/2021    BILIDIR <0.2 07/16/2021    LABALBU 4.0 07/16/2021     Magnesium:    Lab Results   Component Value Date    MG 1.8 04/27/2021     Warfarin PT/INR:  No components found for: PTPATWAR, PTINRWAR  HgBA1c:  No results found for: LABA1C  FLP:  No results found for: TRIG, HDL, LDLCALC, LDLDIRECT, LABVLDL  TSH:    Lab Results   Component Value Date    TSH 2.790 10/26/2021       Conclusions      Summary   Normal left ventricle size and systolic function. Ejection fraction was   estimated at 55 to 60 %. There were no regional left ventricular wall   motion abnormalities and wall thickness was within normal limits. Signature      ----------------------------------------------------------------   Electronically signed by Araseli Mg MD (Interpreting   physician) on 11/02/2021 at 08:14 PM       IMPRESSION:       1. CT coronary artery calcium score of 0 indicates low risk for coronary artery disease. 2. Grossly normal coronary arteries. The distal vessels cannot be assessed. This is due to motion artifact from the patient's elevated heart rate.    3. Normal cardiac function.                   This report has been created using voice recognition software. It may contain minor errors which are inherent in voice recognition technology.               Final report electronically signed by Dr. Nakia Pearce on 12/15/2021 7:31 AM           50 HRS HOLTER  DIARY  Diary not completed     CONCLUSION:  *   Normal Sinus rhythm    Average Heart Rate 116 bp Range from  87 bpm to  153 bpm   Rare Premature atrial complexes -2  Rare Premature ventricular complexes -12  No long pause or profound bradycardia  No Supraventricular Tachycardia   No Atrial Fibrillation          Confirmed by Misty Galeano MD, Rossy Platt ([id number]) on 11/7/2021 12:10:31 PM    tsh wnl 10.2021    ekg 10/27/21  Sinus  Tachycardia  125 bop  Low voltage in precordial leads. -RSR(V1) -nondiagnostic. ABNORMAL         Assessment   Diagnosis Orders   1. Inappropriate sinus tachycardia     2. Intermittent palpitations     3. hX OF Atypical chest pain- CHRONIC, CORONARY CTA WNL     4. Tobacco abuse       Hx of recent left foot surgery walk with crunches    Plan     Continue the current treatment and with constant vigilance to changes in symptoms and also any potential side effects. Return for care or seek medical attention immediately if symptoms got worse and/or develop new symptoms. PALPITATION/SINUS TACHY BETTER  TSH WNL oct 2021  Cbc and BMP wnl  Echo' WNL  48 holter- SINSU TACHY AVERAGE 116  INCREASE  lopressor 37.5 BID FROM 25 BID  Ins DECLINED ivabardine  5 BID    RECURRENT CP- ATYPICAL ONE- CHRONIC   Coronary CTA  WNL    D/W THE PAT THE RESULT    D/w the pat the plan    ALL Q ANSWERED    Discussed use, benefit, and side effects of prescribed medications. All patient questions answered. Pt voiced understanding. Instructed to continue current medications, diet and exercise. Continue risk factor modification and medical management. Patient agreed with treatment plan. Follow up as directed.       RTC IN 4 MONTHS      Moises Yony Hameed MD, MD,FACC

## 2022-01-31 ENCOUNTER — HOSPITAL ENCOUNTER (OUTPATIENT)
Dept: DIABETES SERVICES | Age: 27
Setting detail: THERAPIES SERIES
Discharge: HOME | End: 2022-01-31
Payer: MEDICAID

## 2022-01-31 PROCEDURE — G0108 DIAB MANAGE TRN  PER INDIV: HCPCS
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
## 2022-02-05 NOTE — H&P
[facility] Endoscopy    Pre-Endoscopy H&PE      Patient: Dionne Jama    : 1995    Acct#: [de-identified]  Primary Care Physician: NAMAN Almonte CNP   Date of evaluation: 2022    Planned Procedure:    [x]EGD    []Enteroscopy    []PEG    []PEG change    []PEG removal  []Variceal banding    []Biopsy   [x]Dilation      []Control of bleeding  []Destruction of lesion by APC    []Stent Placement  []Foreign Body Removal    []Snare Polypectomy  []Other:       []Colonoscopy  []Flex Sigmoid []EUS, rectal      []Biopsy   []Dilation      []Control of bleeding  []Destruction of lesion by [facility]    []Stent Placement  []Foreign Body Removal    []Snare Polypectomy  []Other:      Planned Sedation  []Conscious Sedation [x]MAC/Propofol []Anesthesia    []None    Airway:  Adequate for planned sedation    Indication:    dysphagia, GERD, nausea, bloating, upper abdominal pain, and chest pain    History:  The patient is a 27 y.o. female with schizophrenia seen by Allegra Stephen CNP 22 with dysphagia, GERD, nausea, bloating, upper abdominal pain, constipation, and chest pain    Physical Exam:  VITALS: /81   Pulse 110   Temp 97.7 °F (36.5 °C) (Temporal)   Resp 18   Ht 5' 3\" (1.6 m)   Wt 290 lb 12.8 oz (131.9 kg)   LMP 2022   SpO2 96%   BMI 51.51 kg/m²   The patient is a 27 y.o. female in no acute distress. HEAD: Normal cephalic/atraumatic. Extra-occular motions intact bilaterally. NECK: No lymphadenopathy or bruits. CHEST: Rises equally on inspiration. Clear to auscultation bilaterally. CARDIOVASCULAR: Regular rate and rhythm without murmurs, rubs or gallops. ABDOMEN: Soft, nontender, and nondistended with normal bowel sounds. No Hepatosplemomegaly. UPPER EXTREMITIES: no cyanosis, clubbing, or edema. DERM: no rash or jaundice. LOWER EXTREMITIES: no cyanosis, clubbing, or edema. NEURO: Alert and oriented times four.  Patient moves all extremities and has gross sensation in all extremities. ASA: ASA 3 - Patient with moderate systemic disease with functional limitations    Past Medical History:        Diagnosis Date    Arthritis     Bipolar 1 disorder (HCC)     Fibromyalgia     PONV (postoperative nausea and vomiting)     Pre-diabetes     Psychosis [facility]     Schizophrenia ([facility]     Stomach pain      Past Surgical History:        Procedure Laterality Date    CHOLECYSTECTOMY, LAPAROSCOPIC N/A 5/5/2021    ROBOTIC CHOLECYSTECTOMY performed by Ha [place] MD at [facility]  02/2021    Dr. Nena Pisano EGD  02/2021    [place]    FOOT TENDON SURGERY Left 9/20/2021    LEFT FOOT EXCISION OF ACCESSORY NAVICULAR WITH RECONSTRUCTION OF POSTERIOR TIBIAL TENDON performed by Nicolle Sin DPM at [facility] EXTRACTION       Allergies:  Patient has no known allergies. Home Meds:  Prior to Admission medications    Medication Sig Start Date End Date Taking?  Authorizing Provider   metoprolol tartrate 37.5 MG TABS Take 37.5 mg by mouth 2 times daily 1/18/22  Yes Rigo Moreno MD   dicyclomine (BENTYL) 10 MG capsule Take 1 capsule by mouth 4 times daily as needed (abdominal cramping) 12/14/21  Yes NAMAN Arredondo CNP   benztropine (COGENTIN) 0.5 MG tablet Take 0.5 mg by mouth 2 times daily   Yes Historical Provider, MD   prazosin (MINIPRESS) 2 MG capsule Take 2 mg by mouth  11/8/21  Yes Historical Provider, MD   cloZAPine (CLOZARIL) 100 MG tablet Take 125 mg by mouth 2 times daily  10/27/21  Yes Historical Provider, MD   ondansetron (ZOFRAN) 4 MG tablet Take 1 tablet by mouth every 12 hours as needed for Nausea or Vomiting 10/13/21  Yes NAMAN Arredondo CNP   amitriptyline (ELAVIL) 50 MG tablet 50 mg nightly  6/13/21  Yes Historical Provider, MD   busPIRone (BUSPAR) 15 MG tablet 15 mg 3 times daily  6/12/21  Yes Historical Provider, MD   pantoprazole (PROTONIX) 40 MG tablet Take 1 tablet by mouth every morning (before breakfast) 1/20/21  Yes Aleshia Pete APRN - CNP   clonazePAM (KLONOPIN) 0.5 MG tablet Take 0.5 mg by mouth 2 times daily as needed. Yes Historical Provider, MD   paliperidone palmitate ER (INVEGA SUSTENNA) 234 MG/1.5ML FABIANA IM injection Inject into the muscle every 21 days   Yes Historical Provider, MD     Social History:   Social History     Socioeconomic History    Marital status: Single     Spouse name: Not on file    Number of children: Not on file    Years of education: Not on file    Highest education level: Not on file   Occupational History    Not on file   Tobacco Use    Smoking status: Current Every Day Smoker     Packs/day: 1.00     Types: Cigarettes    Smokeless tobacco: Never Used   Vaping Use    Vaping Use: Never used   Substance and Sexual Activity    Alcohol use: Not Currently    Drug use: No    Sexual activity: Yes     Partners: Male   Other Topics Concern    Not on file   Social History Narrative    Not on file     Social Determinants of Health     Financial Resource Strain:     Difficulty of Paying Living Expenses: Not on file   Food Insecurity:     Worried About Running Out of Food in the Last Year: Not on file    Brady of Food in the Last Year: Not on file   Transportation Needs:     Lack of Transportation (Medical): Not on file    Lack of Transportation (Non-Medical):  Not on file   Physical Activity:     Days of Exercise per Week: Not on file    Minutes of Exercise per Session: Not on file   Stress:     Feeling of Stress : Not on file   Social Connections:     Frequency of Communication with Friends and Family: Not on file    Frequency of Social Gatherings with Friends and Family: Not on file    Attends Synagogue Services: Not on file    Active Member of Clubs or Organizations: Not on file    Attends Club or Organization Meetings: Not on file    Marital Status: Not on file   Intimate Partner Violence:     Fear of Current or Ex-Partner: Not on file    Emotionally Abused: Not on file    Physically Abused: Not on file    Sexually Abused: Not on file   Housing Stability:     Unable to Pay for Housing in the Last Year: Not on file    Number of Places Lived in the Last Year: Not on file    Unstable Housing in the Last Year: Not on file     Family History:   No GI malignancies     ROS:  GENERAL: No fever or chills. NEURO: No headache or seizure. EYES: No diplopia or vision loss. CARDIOVASCULAR: No chest pain or palpitations. RESPIRATORY: No dyspnea or cough. GI: NO melena. NO hematochezia   : No dysuria or hematuria. GYN: No discharge or abnormal bleeding. MUSCULOSKELETAL: No new arthralgias or myalgias  DERM: No rash or jaundice. ENDOCRINE: No polyuria or polydipsia. PSYCH: No anxiety or depression. Informed Consent:  The risks and benefits of the procedure have been discussed with either the patient or if they cannot consent, their representative. Assessment:  Patient examined and appropriate for planned sedation and procedure. Plan:  Proceed with planned sedation and procedure.     Marco Hoffman MD, MD  11:30 AM 2/7/2022

## 2022-02-07 ENCOUNTER — ANESTHESIA EVENT (OUTPATIENT)
Dept: ENDOSCOPY | Age: 27
End: 2022-02-07
Payer: MEDICAID

## 2022-02-07 ENCOUNTER — ANESTHESIA (OUTPATIENT)
Dept: ENDOSCOPY | Age: 27
End: 2022-02-07
Payer: MEDICAID

## 2022-02-07 ENCOUNTER — HOSPITAL ENCOUNTER (OUTPATIENT)
Age: 27
Setting detail: OUTPATIENT SURGERY
Discharge: HOME | End: 2022-02-07
Attending: INTERNAL MEDICINE | Admitting: INTERNAL MEDICINE
Payer: MEDICAID

## 2022-02-07 VITALS
SYSTOLIC BLOOD PRESSURE: 148 MMHG | RESPIRATION RATE: 31 BRPM | DIASTOLIC BLOOD PRESSURE: 99 MMHG | OXYGEN SATURATION: 98 %

## 2022-02-07 VITALS
DIASTOLIC BLOOD PRESSURE: 92 MMHG | OXYGEN SATURATION: 96 % | TEMPERATURE: 97.7 F | HEART RATE: 102 BPM | WEIGHT: 290.8 LBS | HEIGHT: 63 IN | BODY MASS INDEX: 51.52 KG/M2 | RESPIRATION RATE: 18 BRPM | SYSTOLIC BLOOD PRESSURE: 141 MMHG

## 2022-02-07 LAB — PREGNANCY, URINE: NEGATIVE

## 2022-02-07 PROCEDURE — 6360000002 HC RX W HCPCS: Performed by: REGISTERED NURSE

## 2022-02-07 PROCEDURE — 2500000003 HC RX 250 WO HCPCS: Performed by: REGISTERED NURSE

## 2022-02-07 PROCEDURE — 3700000000 HC ANESTHESIA ATTENDED CARE: Performed by: INTERNAL MEDICINE

## 2022-02-07 PROCEDURE — 3609012400 HC EGD TRANSORAL BIOPSY SINGLE/MULTIPLE: Performed by: INTERNAL MEDICINE

## 2022-02-07 PROCEDURE — 81025 URINE PREGNANCY TEST: CPT

## 2022-02-07 PROCEDURE — 2580000003 HC RX 258: Performed by: INTERNAL MEDICINE

## 2022-02-07 PROCEDURE — 7100000010 HC PHASE II RECOVERY - FIRST 15 MIN: Performed by: INTERNAL MEDICINE

## 2022-02-07 PROCEDURE — 2709999900 HC NON-CHARGEABLE SUPPLY: Performed by: INTERNAL MEDICINE

## 2022-02-07 PROCEDURE — 2720000010 HC SURG SUPPLY STERILE: Performed by: INTERNAL MEDICINE

## 2022-02-07 PROCEDURE — 7100000011 HC PHASE II RECOVERY - ADDTL 15 MIN: Performed by: INTERNAL MEDICINE

## 2022-02-07 PROCEDURE — 3609012700 HC EGD DILATION SAVORY: Performed by: INTERNAL MEDICINE

## 2022-02-07 PROCEDURE — 88305 TISSUE EXAM BY PATHOLOGIST: CPT

## 2022-02-07 RX ORDER — SODIUM CHLORIDE 0.9 % (FLUSH) 0.9 %
5-40 SYRINGE (ML) INJECTION PRN
Status: DISCONTINUED | OUTPATIENT
Start: 2022-02-07 | End: 2022-02-07 | Stop reason: HOSPADM

## 2022-02-07 RX ORDER — SODIUM CHLORIDE 0.9 % (FLUSH) 0.9 %
5-40 SYRINGE (ML) INJECTION EVERY 12 HOURS SCHEDULED
Status: DISCONTINUED | OUTPATIENT
Start: 2022-02-07 | End: 2022-02-07 | Stop reason: HOSPADM

## 2022-02-07 RX ORDER — SODIUM CHLORIDE 9 MG/ML
25 INJECTION, SOLUTION INTRAVENOUS PRN
Status: DISCONTINUED | OUTPATIENT
Start: 2022-02-07 | End: 2022-02-07 | Stop reason: HOSPADM

## 2022-02-07 RX ORDER — PROPOFOL 10 MG/ML
INJECTION, EMULSION INTRAVENOUS PRN
Status: DISCONTINUED | OUTPATIENT
Start: 2022-02-07 | End: 2022-02-07 | Stop reason: SDUPTHER

## 2022-02-07 RX ORDER — SUCRALFATE 1 G/1
1 TABLET ORAL 2 TIMES DAILY
Qty: 60 TABLET | Refills: 5 | Status: SHIPPED | OUTPATIENT
Start: 2022-02-07

## 2022-02-07 RX ORDER — SODIUM CHLORIDE 450 MG/100ML
INJECTION, SOLUTION INTRAVENOUS CONTINUOUS
Status: DISCONTINUED | OUTPATIENT
Start: 2022-02-07 | End: 2022-02-07 | Stop reason: HOSPADM

## 2022-02-07 RX ORDER — LIDOCAINE HYDROCHLORIDE 20 MG/ML
INJECTION, SOLUTION EPIDURAL; INFILTRATION; INTRACAUDAL; PERINEURAL PRN
Status: DISCONTINUED | OUTPATIENT
Start: 2022-02-07 | End: 2022-02-07 | Stop reason: SDUPTHER

## 2022-02-07 RX ADMIN — PROPOFOL 200 MG: 10 INJECTION, EMULSION INTRAVENOUS at 11:40

## 2022-02-07 RX ADMIN — LIDOCAINE HYDROCHLORIDE 100 MG: 20 INJECTION, SOLUTION EPIDURAL; INFILTRATION; INTRACAUDAL; PERINEURAL at 11:40

## 2022-02-07 RX ADMIN — SODIUM CHLORIDE: 4.5 INJECTION, SOLUTION INTRAVENOUS at 10:36

## 2022-02-07 ASSESSMENT — LIFESTYLE VARIABLES: SMOKING_STATUS: 1

## 2022-02-07 ASSESSMENT — PAIN - FUNCTIONAL ASSESSMENT: PAIN_FUNCTIONAL_ASSESSMENT: 0-10

## 2022-02-07 ASSESSMENT — PAIN SCALES - GENERAL
PAINLEVEL_OUTOF10: 0
PAINLEVEL_OUTOF10: 0

## 2022-02-07 NOTE — ANESTHESIA PRE PROCEDURE
Department of Anesthesiology  Preprocedure Note       Name:  Arelis Marquez   Age:  27 y.o.  :  1995                                          MRN:  [id number]         Date:  2022      Surgeon: Araceli Salcedo):  Sean Kiran MD    Procedure: Procedure(s):  EGD DILATION SAVORY    Medications prior to admission:   Prior to Admission medications    Medication Sig Start Date End Date Taking? Authorizing Provider   metoprolol tartrate 37.5 MG TABS Take 37.5 mg by mouth 2 times daily 22   Megan Mccall MD   dicyclomine (BENTYL) 10 MG capsule Take 1 capsule by mouth 4 times daily as needed (abdominal cramping) 21   NAMAN Gonzalez CNP   benztropine (COGENTIN) 0.5 MG tablet Take 0.5 mg by mouth 2 times daily    Historical Provider, MD   prazosin (MINIPRESS) 2 MG capsule Take 2 mg by mouth  21   Historical Provider, MD   cloZAPine (CLOZARIL) 100 MG tablet Take 125 mg by mouth 2 times daily  10/27/21   Historical Provider, MD   ondansetron (ZOFRAN) 4 MG tablet Take 1 tablet by mouth every 12 hours as needed for Nausea or Vomiting 10/13/21   NAMAN Gonzalez CNP   amitriptyline (ELAVIL) 50 MG tablet 50 mg nightly  21   Historical Provider, MD   busPIRone (BUSPAR) 15 MG tablet 15 mg 3 times daily  21   Historical Provider, MD   pantoprazole (PROTONIX) 40 MG tablet Take 1 tablet by mouth every morning (before breakfast) 21   NAMAN Gonzalez CNP   clonazePAM (KLONOPIN) 0.5 MG tablet Take 0.5 mg by mouth 2 times daily as needed. Historical Provider, MD   paliperidone palmitate ER (INVEGA SUSTENNA) 234 MG/1.5ML FABIANA IM injection Inject into the muscle every 21 days    Historical Provider, MD       Current medications:    No current facility-administered medications for this visit. No current outpatient medications on file.      Facility-Administered Medications Ordered in Other Visits   Medication Dose Route Frequency Provider Last Rate Last Admin    0.45 % sodium chloride infusion   IntraVENous Continuous Fanta Pendleton MD        sodium chloride flush 0.9 % injection 5-40 mL  5-40 mL IntraVENous 2 times per day Fanta Pendleton MD        sodium chloride flush 0.9 % injection 5-40 mL  5-40 mL IntraVENous PRN Fanta Pendleton MD        0.9 % sodium chloride infusion  25 mL IntraVENous PRN Fanta Pendleton MD           Allergies:  No Known Allergies    Problem List:    Patient Active Problem List   Diagnosis Code    Schizophrenia ([facility] F20.9    Tachycardia R00.0    Chest pain R07.9    Sepsis ([facility] A41.9    Leukocytosis D72.829    Normocytic anemia D64.9    Bilateral atelectasis K87.20    Metabolic acidosis L31.8    hX OF Atypical chest pain- CHRONIC, CORONARY CTA WNL R07.89    Morbid obesity (HCC) E66.01    Bipolar 1 disorder ([facility] F31.9    Tobacco abuse Z72.0    BMI 40.0-44.9, adult ([facility] G27.71    Biliary colic N81.25    Intermittent palpitations R00.2    SOB (shortness of breath) on exertion R06.02    Sinus tachycardia R00.0    Inappropriate sinus tachycardia R00.0       Past Medical History:        Diagnosis Date    Arthritis     Bipolar 1 disorder (HCC)     Fibromyalgia     PONV (postoperative nausea and vomiting)     Pre-diabetes     Psychosis ([facility]     Schizophrenia ([facility]     Stomach pain        Past Surgical History:        Procedure Laterality Date    CHOLECYSTECTOMY, LAPAROSCOPIC N/A 5/5/2021    ROBOTIC CHOLECYSTECTOMY performed by Anu Velazquez MD at [place] [address]  02/2021    Dr. Serene Shirley EGBISHOP  02/2021    [place]    FOOT TENDON SURGERY Left 9/20/2021    LEFT FOOT EXCISION OF ACCESSORY NAVICULAR WITH RECONSTRUCTION OF POSTERIOR TIBIAL TENDON performed by Mary Beasley DPM at [address] [place] [address]         Social History:    Social History     Tobacco Use    Smoking status: Current Every Day Smoker     Packs/day: 1.00     Types: Cigarettes    Smokeless tobacco: Never Used   Substance Use Topics    Alcohol use: Not Currently                                Ready to quit: Not Answered  Counseling given: Not Answered      Vital Signs (Current): There were no vitals filed for this visit. BP Readings from Last 3 Encounters:   01/18/22 (!) 137/94   12/14/21 121/84   12/14/21 (!) 130/91       NPO Status:                                                                                 BMI:   Wt Readings from Last 3 Encounters:   01/18/22 290 lb 6.4 oz (131.7 kg)   12/14/21 287 lb (130.2 kg)   11/16/21 286 lb (129.7 kg)     There is no height or weight on file to calculate BMI.    CBC:   Lab Results   Component Value Date    WBC 15.2 10/26/2021    RBC 4.57 10/26/2021    HGB 13.4 10/26/2021    HCT 42.7 10/26/2021    MCV 93.4 10/26/2021     10/26/2021       CMP:   Lab Results   Component Value Date     09/03/2021    K 4.2 09/03/2021    K 4.0 05/27/2021     09/03/2021    CO2 24 09/03/2021    BUN 9 09/03/2021    CREATININE 0.6 09/03/2021    LABGLOM >90 09/03/2021    GLUCOSE 110 10/26/2021    PROT 6.9 07/16/2021    CALCIUM 9.6 09/03/2021    BILITOT <0.2 07/16/2021    ALKPHOS 82 07/16/2021    AST 12 07/16/2021    ALT 13 07/16/2021       POC Tests: No results for input(s): POCGLU, POCNA, POCK, POCCL, POCBUN, POCHEMO, POCHCT in the last 72 hours.     Coags: No results found for: PROTIME, INR, APTT    HCG (If Applicable):   Lab Results   Component Value Date    PREGTESTUR negative 09/20/2021    PREGSERUM NEGATIVE 07/16/2021        ABGs: No results found for: PHART, PO2ART, CNR1TVT, IGM4GFC, BEART, I2MJOCRX     Type & Screen (If Applicable):  No results found for: LABABO, LABRH    Drug/Infectious Status (If Applicable):  No results found for: HIV, HEPCAB    COVID-19 Screening (If Applicable):   Lab Results   Component Value Date    COVID19 Not Detected 04/29/2021           Anesthesia Evaluation  Patient summary reviewed and Nursing notes reviewed no history of

## 2022-02-07 NOTE — PROGRESS NOTES
EGD completed, Pictures taken. bx taken. 2 jars sent to lab. dulation 54 fr. Pt tolerated well       Scope  used.

## 2022-02-07 NOTE — OP NOTE
[facility] Endoscopy    Patient: Roger Mix  : 1995  Acct#: [de-identified]  Date of evaluation: 2022  Primary Care Physician: NAMAN Reynolds CNP     Procedure:    [x]EGD    []Enteroscopy    [x]Biopsy   [x]Dilation      []PEG    []PEG change    []PEG removal  []Variceal banding    []Control of bleeding  []Destruction of lesion by [facility]    []Stent Placement  []Foreign Body Removal    []Snare Polypectomy  []Other:     []Aborted:        []Colonoscopy  []Flex Sigmoid []EUS, rectal     []Biopsy   []Snare Polypectomy    []Control of bleeding  []Destruction of lesion by APC    []Stent Placement  []Foreign Body Removal    []Dilation    []Other:    []Aborted:   []Tattoo    Indication:    dysphagia, GERD, nausea, bloating, upper abdominal pain, and chest pain    History:  The patient is a 27 y.o. female with schizophrenia seen by Perez Pereira CNP 22 with dysphagia, GERD, nausea, bloating, upper abdominal pain, constipation, and chest pain. She denies NSAID use. She's taken pantoprazole for 6 months without improvement. Physical Exam:  VITALS: /81   Pulse 110   Temp 97.7 °F (36.5 °C) (Temporal)   Resp 18   Ht 5' 3\" (1.6 m)   Wt 290 lb 12.8 oz (131.9 kg)   LMP 2022   SpO2 96%   BMI 51.51 kg/m²   The patient is a 27 y.o. female in no acute distress. HEAD: Normal cephalic/atraumatic. Extra-occular motions intact bilaterally. NECK: No lymphadenopathy or bruits. CHEST: Rises equally on inspiration. Clear to auscultation bilaterally. CARDIOVASCULAR: Regular rate and rhythm without murmurs, rubs or gallops. ABDOMEN: Soft, nontender, and nondistended with normal bowel sounds. No Hepatosplemomegaly. UPPER EXTREMITIES: no cyanosis, clubbing, or edema. DERM: no rash or jaundice. LOWER EXTREMITIES: no cyanosis, clubbing, or edema. NEURO: Alert and oriented times four.  Patient moves all extremities and has gross sensation in all extremities. ASA: ASA 3 - Patient with moderate systemic disease with functional limitations    MEDICATION:    MAC/Propofol/Anesthesia:  Yes     Photo:  Yes  Biopsy:  Yes      Description of Procedure: The risks and benefits of the procedure were described to the patient or their representative if unable to give consent including but not limited to bleeding, infection, poking a hole someplace requiring surgery to fix it, having a reaction to medication, and death. The patient or their representative if unable to give consent understood these risks and provided informed consent. Airway was assessed and is adequate for the planned sedation. Anesthesia: MAC  Estimated Blood Loss: less than 50   Complications: None  Specimens: Was Obtained:  see below     Sedation was administered by anesthesia who monitored the patient during the procedure. The patient was placed in the left lateral decubitus position. A forward-viewing Olympus endoscope was lubricated and inserted through the mouth into the oropharynx. Under direct visualization, the upper esophagus was intubated. The scope was advanced through the esophagus and stomach to second portion of duodenum. Scope was slowly withdrawn with good views of mucosal surfaces. The scope was retroflexed in the fundus. Findings and maneuvers are listed in impression below. The patient tolerated the procedure well. The scope was removed. There were no immediate complications. IMPRESSION:  1. Esophagus - normal   2. Stomach - mild gastritis, biopsied for Hpylori using Chi criteria  3. Duodenum - normal, biopsied  4. Empiric dilation performed of the esophagus with a [employer] dilator over a guidewire. There is no resistance at the UES on passing the dilator. Upon removal, the tips of dilator and guidewire are intact. The endoscope was reinserted. There are no mucosal tears. 5.  Greasy-appearing retained gastric fluid. RECOMMENDATIONS:    1. Await pathology   2. Post-esophageal dilation recommendations including soft diet and cepacol spray or lozenges as needed for sore throat. 3.  Continue pantoprazole daily  4. Take sucralfate 1000 mg twice a day 5-60\" before lunch and dinner  5. Follow-up with Jess Kingsley CNP in 6-8 weeks at  [place] [address].  Maral Neri MD, MD  11:52 AM 2/7/2022

## 2022-02-07 NOTE — ANESTHESIA POSTPROCEDURE EVALUATION
Department of Anesthesiology  Postprocedure Note    Patient: Purnima Mitchell  MRN: [id number]  YOB: 1995  Date of evaluation: 2/7/2022  Time:  11:49 AM     Procedure Summary     Date: 02/07/22 Room / Location: [address] [place] [address] / [address] [place] [address]    Anesthesia Start: 3134 Anesthesia Stop: 1147    Procedures:       EGD DILATION SAVORY (N/A )      EGD BIOPSY (Left Esophagus) Diagnosis: (DYSPHAGIA, GERD)    Surgeons: Silvina Fountain MD Responsible Provider: Camila De Santiago DO    Anesthesia Type: general ASA Status: 3          Anesthesia Type: general    Veronica Phase I: Veronica Score: 10    Veronica Phase II:      Last vitals: Reviewed and per EMR flowsheets.        Anesthesia Post Evaluation    Patient location during evaluation: bedside  Patient participation: complete - patient participated  Level of consciousness: responsive to painful stimuli  Airway patency: patent  Nausea & Vomiting: no nausea and no vomiting  Complications: no  Cardiovascular status: hemodynamically stable  Respiratory status: acceptable, spontaneous ventilation and nasal cannula  Hydration status: stable

## 2022-02-07 NOTE — PROGRESS NOTES
Awake- Dr Corrinne Stagger in to speak with pt and family regarding findings and plan of care. Tolerating oral fluids well.

## 2022-02-08 ENCOUNTER — HOSPITAL ENCOUNTER (OUTPATIENT)
Age: 27
Discharge: HOME | End: 2022-02-08

## 2022-02-08 DIAGNOSIS — R63.5 WEIGHT GAIN: ICD-10-CM

## 2022-02-14 ENCOUNTER — NURSE ONLY (OUTPATIENT)
Dept: LAB | Age: 27
End: 2022-02-14

## 2022-02-14 DIAGNOSIS — R63.5 WEIGHT GAIN: ICD-10-CM

## 2022-02-14 LAB
CREATININE URINE: 1.1 GM/24HR
CREATININE URINE: 37.4 MG/DL
HOURS COLLECTED: 24 HRS
URINE VOLUME, 24 HOUR: 2900 ML

## 2022-02-19 LAB — CORTISOL (UR), FREE: NORMAL

## 2022-03-14 LAB — CREATINE 24 HOUR URINE: 37.4

## 2022-03-15 ENCOUNTER — NURSE ONLY (OUTPATIENT)
Dept: LAB | Age: 27
End: 2022-03-15

## 2022-03-15 DIAGNOSIS — E28.2 PCOS (POLYCYSTIC OVARIAN SYNDROME): ICD-10-CM

## 2022-03-16 LAB
DHEAS (DHEA SULFATE): 474 UG/DL (ref 65–380)
PROLACTIN: 51.9 NG/ML

## 2022-03-20 LAB — TESTOSTERONE FREE: NORMAL

## 2022-03-22 LAB — 17-HYDROXYPROGESTERONE: 23.66 NG/DL

## 2022-03-29 ENCOUNTER — HOSPITAL ENCOUNTER (OUTPATIENT)
Dept: PHYSICAL THERAPY | Age: 27
Setting detail: THERAPIES SERIES
Discharge: HOME | End: 2022-03-29
Payer: MEDICAID

## 2022-03-29 PROCEDURE — 97162 PT EVAL MOD COMPLEX 30 MIN: CPT
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
## 2022-04-01 ENCOUNTER — HOSPITAL ENCOUNTER (OUTPATIENT)
Dept: PHYSICAL THERAPY | Age: 27
Setting detail: THERAPIES SERIES
Discharge: HOME | End: 2022-04-01
Payer: MEDICAID

## 2022-04-01 PROCEDURE — 97110 THERAPEUTIC EXERCISES: CPT
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
## 2022-04-01 NOTE — RESULT ENCOUNTER NOTE
DHEA-sulfate is elevated, consistent with polycystic ovarian syndrome. Work on [employer] control. Keep existing appointment.

## 2022-04-05 ENCOUNTER — APPOINTMENT (OUTPATIENT)
Dept: PHYSICAL THERAPY | Age: 27
End: 2022-04-05
Payer: MEDICAID

## 2022-04-07 ENCOUNTER — HOSPITAL ENCOUNTER (OUTPATIENT)
Dept: PHYSICAL THERAPY | Age: 27
Setting detail: THERAPIES SERIES
Discharge: HOME | End: 2022-04-07
Payer: MEDICAID

## 2022-04-07 PROCEDURE — 97110 THERAPEUTIC EXERCISES: CPT
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
## 2022-04-14 ENCOUNTER — HOSPITAL ENCOUNTER (OUTPATIENT)
Dept: PHYSICAL THERAPY | Age: 27
Setting detail: THERAPIES SERIES
Discharge: HOME | End: 2022-04-14
Payer: MEDICAID

## 2022-04-14 PROCEDURE — 97110 THERAPEUTIC EXERCISES: CPT
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
## 2022-04-26 ENCOUNTER — APPOINTMENT (OUTPATIENT)
Dept: PHYSICAL THERAPY | Age: 27
End: 2022-04-26
Payer: MEDICAID

## 2022-04-28 ENCOUNTER — HOSPITAL ENCOUNTER (OUTPATIENT)
Dept: PHYSICAL THERAPY | Age: 27
Setting detail: THERAPIES SERIES
End: 2022-04-28
Payer: MEDICAID

## 2022-05-17 ENCOUNTER — OFFICE VISIT (OUTPATIENT)
Dept: CARDIOLOGY CLINIC | Age: 27
End: 2022-05-17
Payer: MEDICAID

## 2022-05-17 VITALS
SYSTOLIC BLOOD PRESSURE: 134 MMHG | HEIGHT: 63 IN | BODY MASS INDEX: 51.91 KG/M2 | WEIGHT: 293 LBS | HEART RATE: 98 BPM | DIASTOLIC BLOOD PRESSURE: 91 MMHG

## 2022-05-17 DIAGNOSIS — R00.0 INAPPROPRIATE SINUS TACHYCARDIA: ICD-10-CM

## 2022-05-17 DIAGNOSIS — R06.02 SOB (SHORTNESS OF BREATH) ON EXERTION: ICD-10-CM

## 2022-05-17 DIAGNOSIS — E66.01 MORBID OBESITY (HCC): ICD-10-CM

## 2022-05-17 DIAGNOSIS — R42 DIZZINESS ON STANDING: ICD-10-CM

## 2022-05-17 DIAGNOSIS — R07.89 ATYPICAL CHEST PAIN: Primary | ICD-10-CM

## 2022-05-17 DIAGNOSIS — R00.2 INTERMITTENT PALPITATIONS: ICD-10-CM

## 2022-05-17 DIAGNOSIS — Z72.0 TOBACCO ABUSE: ICD-10-CM

## 2022-05-17 PROCEDURE — G8417 CALC BMI ABV UP PARAM F/U: HCPCS | Performed by: INTERNAL MEDICINE

## 2022-05-17 PROCEDURE — 93000 ELECTROCARDIOGRAM COMPLETE: CPT | Performed by: INTERNAL MEDICINE

## 2022-05-17 PROCEDURE — 99214 OFFICE O/P EST MOD 30 MIN: CPT | Performed by: INTERNAL MEDICINE

## 2022-05-17 PROCEDURE — G8427 DOCREV CUR MEDS BY ELIG CLIN: HCPCS | Performed by: INTERNAL MEDICINE

## 2022-05-17 PROCEDURE — 4004F PT TOBACCO SCREEN RCVD TLK: CPT | Performed by: INTERNAL MEDICINE

## 2022-05-17 RX ORDER — METOPROLOL TARTRATE 50 MG/1
50 TABLET, FILM COATED ORAL 2 TIMES DAILY
Qty: 60 TABLET | Refills: 11 | Status: SHIPPED | OUTPATIENT
Start: 2022-05-17

## 2022-05-17 NOTE — PROGRESS NOTES
Chief Complaint   Patient presents with    Follow-up    Tachycardia       Saw dr. Jeannette Pelletier for pre op eval BEFORE      ORIGINALLY Came 10/27/21 for palpitation, CP,arm numbness, sob  And dizziness for over 2 yrs after she started injection for schizophrenia once in 3 week      4 month follow up. EKG done today. Occasional dizziness on walk     Sob on exertion    PALPITATION BETTER NOW USED TO BE DAILY  NOW ON LOPRESSOR  IVABARDINE INS DECLINED    DENIED  EDEMA    EKG DONE 10-. CONT TO HAVE CP SHARP -ATYPICAL ONCE IN A WHILE -CHONIC FOR OVER 3 YRS  Seen in hospital several times for cp none was found    Still get hallucination AND WAKING IN THE NIGHT WITH PANIC LIKE SITUATION  SEES DR. MYERS FOR PSYCH PROBLEMS    Smoke 1ppd for 14 yrs    FHx  Father had heart issue -detail unknown        Past Surgical History:   Procedure Laterality Date    CHOLECYSTECTOMY, LAPAROSCOPIC N/A 5/5/2021    ROBOTIC CHOLECYSTECTOMY performed by Steven Atkinson MD at [facility]  02/2021    Dr. Gabriella Sun EGD  02/2021    [place]    FOOT TENDON SURGERY Left 9/20/2021    LEFT FOOT EXCISION OF ACCESSORY NAVICULAR WITH RECONSTRUCTION OF POSTERIOR TIBIAL TENDON [facility] D[facility] N/A 2/7/2022    EGD DILATION SAVORY performed by Julee Ratliff MD at [facility]    UPPER GASTROINTESTINAL ENDOSCOPY Left 2/7/2022    EGD BIOPSY performed by Julee Ratliff MD at [facility]    WISDOM TOOTH EXTRACTION         No Known Allergies     Family History   Problem Relation Age of Onset    Colon Cancer Neg Hx     Colon Polyps Neg Hx     Esophageal Cancer Neg Hx         Social History     Socioeconomic History    Marital status: Single     Spouse name: Not on file    Number of children: Not on file    Years of education: Not on file    Highest education level: Not on file   Occupational History    Not on file   Tobacco Use    Smoking status: Current Every Day Smoker     Packs/day: 1.00     Types: Cigarettes    Smokeless tobacco: Never Used   Vaping Use    Vaping Use: Never used   Substance and Sexual Activity    Alcohol use: Not Currently    Drug use: No    Sexual activity: Yes     Partners: Male   Other Topics Concern    Not on file   Social History Narrative    Not on file     Social Determinants of Health     Financial Resource Strain:     Difficulty of Paying Living Expenses: Not on file   Food Insecurity:     Worried About Running Out of Food in the Last Year: Not on file    Brady of Food in the Last Year: Not on file   Transportation Needs:     Lack of Transportation (Medical): Not on file    Lack of Transportation (Non-Medical):  Not on file   Physical Activity:     Days of Exercise per Week: Not on file    Minutes of Exercise per Session: Not on file   Stress:     Feeling of Stress : Not on file   Social Connections:     Frequency of Communication with Friends and Family: Not on file    Frequency of Social Gatherings with Friends and Family: Not on file    Attends Confucianism Services: Not on file    Active Member of [address] [place] [address] or Organizations: Not on file    Attends Club or Organization Meetings: Not on file    Marital Status: Not on file   Intimate Partner Violence:     Fear of Current or Ex-Partner: Not on file    Emotionally Abused: Not on file    Physically Abused: Not on file    Sexually Abused: Not on file   Housing Stability:     Unable to Pay for Housing in the Last Year: Not on file    Number of [place] in the Last Year: Not on file    Unstable Housing in the Last Year: Not on file       Current Outpatient Medications   Medication Sig Dispense Refill    Probiotic Product (PROBIOTIC-10 PO) Take by mouth      metoprolol tartrate (LOPRESSOR) 50 MG tablet Take 1 tablet by mouth 2 times daily 60 tablet 11    Multiple Vitamins-Minerals (THERAPEUTIC MULTIVITAMIN-MINERALS) tablet Take 1 tablet by mouth daily      acetaminophen (TYLENOL) 500 MG tablet Take 1,000 mg by mouth every morning      cloZAPine (CLOZARIL) 25 MG tablet       sucralfate (CARAFATE) 1 GM tablet Take 1 tablet by mouth 2 times daily 5-60\" before lunch and dinner 60 tablet 5    benztropine (COGENTIN) 0.5 MG tablet Take 0.5 mg by mouth 2 times daily      prazosin (MINIPRESS) 2 MG capsule Take 2 mg by mouth       cloZAPine (CLOZARIL) 100 MG tablet Take 125 mg by mouth 2 times daily       ondansetron (ZOFRAN) 4 MG tablet Take 1 tablet by mouth every 12 hours as needed for Nausea or Vomiting 60 tablet 1    busPIRone (BUSPAR) 15 MG tablet 15 mg 4 times daily       clonazePAM (KLONOPIN) 0.5 MG tablet Take 0.5 mg by mouth 2 times daily as needed.  paliperidone palmitate ER (INVEGA SUSTENNA) 234 MG/1.5ML FABIANA IM injection Inject into the muscle every 30 days       pantoprazole (PROTONIX) 40 MG tablet Take 1 tablet by mouth every morning (before breakfast) (Patient not taking: Reported on 3/15/2022) 30 tablet 6     No current facility-administered medications for this visit. Review of Systems -     General ROS: negative  Psychological ROS: negative  Hematological and Lymphatic ROS: No history of blood clots or bleeding disorder. Respiratory ROS: no cough,  or wheezing, the rest see HPI  Cardiovascular ROS: See HPI  Gastrointestinal ROS: negative  Genito-Urinary ROS: no dysuria, trouble voiding, or hematuria  Musculoskeletal ROS: negative  Neurological ROS: no TIA or stroke symptoms  Dermatological ROS: negative      Blood pressure (!) 134/91, pulse 98, height 5' 3\" (1.6 m), weight 295 lb 9.6 oz (134.1 kg).         Physical Examination:    General appearance - alert, well appearing, and in no distress  HEENT- Pink conjunctiva  , Non-icteri sclera,PERRLA  Mental status - alert, oriented to person, place, and time  Neck - supple, no significant adenopathy, no JVD, or carotid bruits  Chest - clear to auscultation, no wheezes, rales or rhonchi, symmetric air entry  Heart - normal rate, regular rhythm, normal S1, S2, no murmurs, rubs, clicks or gallops  Abdomen - soft, nontender, nondistended, no masses or organomegaly  CARROLL- no CVA or flank tenderness, no suprapubic tenderness  Neurological - alert, oriented, normal speech, no focal findings or movement disorder noted  Musculoskeletal/limbs - no joint tenderness, deformity or swelling   - peripheral pulses normal, no pedal edema, no clubbing or cyanosis  Skin - normal coloration and turgor, no rashes, no suspicious skin lesions noted  Psych- appropriate mood and affect    Lab  No results for input(s): CKTOTAL, CKMB, CKMBINDEX, TROPONINI in the last 72 hours. CBC:   Lab Results   Component Value Date    WBC 15.2 10/26/2021    RBC 4.57 10/26/2021    HGB 13.4 10/26/2021    HCT 42.7 10/26/2021    MCV 93.4 10/26/2021    MCH 29.3 10/26/2021    MCHC 31.4 10/26/2021     10/26/2021    MPV 9.8 10/26/2021     BMP:    Lab Results   Component Value Date     02/08/2022    K 4.5 02/08/2022    K 4.0 05/27/2021     02/08/2022    CO2 21 02/08/2022    BUN 8 02/08/2022    LABALBU 4.1 02/08/2022    CREATININE 0.7 02/08/2022    CALCIUM 9.4 02/08/2022    LABGLOM >90 02/08/2022    LABGLOM >90 09/03/2021    GLUCOSE 82 02/08/2022     Hepatic Function Panel:    Lab Results   Component Value Date    ALKPHOS 107 02/08/2022    ALT 22 02/08/2022    AST 16 02/08/2022    PROT 6.9 07/16/2021    BILITOT 0.2 02/08/2022    BILIDIR <0.2 07/16/2021    LABALBU 4.1 02/08/2022     Magnesium:    Lab Results   Component Value Date    MG 1.8 04/27/2021     Warfarin PT/INR:  No components found for: PTPATWAR, PTINRWAR  HgBA1c:  No results found for: LABA1C  FLP:  No results found for: TRIG, HDL, LDLCALC, LDLDIRECT, LABVLDL  TSH:    Lab Results   Component Value Date    TSH 1.270 02/08/2022       Conclusions      Summary   Normal left ventricle size and systolic function. Ejection fraction was   estimated at 55 to 60 %.  There were no regional left ventricular wall   motion abnormalities and wall thickness was within normal limits. Signature      ----------------------------------------------------------------   Electronically signed by Obdulia Germain MD (Interpreting   physician) on 11/02/2021 at 08:14 PM       IMPRESSION:       1. CT coronary artery calcium score of 0 indicates low risk for coronary artery disease. 2. Grossly normal coronary arteries. The distal vessels cannot be assessed. This is due to motion artifact from the patient's elevated heart rate. 3. Normal cardiac function.                   This report has been created using voice recognition software. It may contain minor errors which are inherent in voice recognition technology.               Final report electronically signed by Dr. Hazel Fraser on 12/15/2021 7:31 AM           50 HRS HOLTER  DIARY  Diary not completed     CONCLUSION:  *   Normal Sinus rhythm    Average Heart Rate 116 bp Range from  87 bpm to  153 bpm   Rare Premature atrial complexes -2  Rare Premature ventricular complexes -12  No long pause or profound bradycardia  No Supraventricular Tachycardia   No Atrial Fibrillation          Confirmed by Neeraj Agee MD, [facility] ([id number]) on 11/7/2021 12:10:31 PM    tsh wnl 10.2021    ekg 10/27/21  Sinus  Tachycardia  125 bop  Low voltage in precordial leads. -RSR(V1) -nondiagnostic. ABNORMAL     ekg 5/17/22  Sinus  Rhythm   Low voltage in precordial leads. -RSR(V1) -nondiagnostic. ABNORMAL           Assessment   Diagnosis Orders   1. hX OF Atypical chest pain- CHRONIC, CORONARY CTA WNL     2. SOB (shortness of breath) on exertion     3. Intermittent palpitations     4. Inappropriate sinus tachycardia     5. Dizziness on standing     6. Morbid obesity ([address]     7.  Tobacco abuse       Hx of recent left foot surgery walk with crunches    Plan   The meds and labs reviewed    Continue the current treatment and with constant vigilance to changes in symptoms and also any potential side effects. Return for care or seek medical attention immediately if symptoms got worse and/or develop new symptoms. PALPITATION/SINUS TACHY BETTER  TSH WNL oct 2021  Cbc and BMP wnl  Echo' WNL  48 holter- SINSU TACHY AVERAGE 116  INCREASE  lopressor 50 BID FROM 37.5 BID  Ins DECLINED ivabardine  5 BID    RECURRENT CP- ATYPICAL ONE- CHRONIC   Coronary CTA  WNL dec 2021    D/W THE PAT THE RESULT    D/w the pat the plan    ALL Q ANSWERED    Stable from cardiac stand    Advised to lose wt    Discussed use, benefit, and side effects of prescribed medications. All patient questions answered. Pt voiced understanding. Instructed to continue current medications, diet and exercise. Continue risk factor modification and medical management. Patient agreed with treatment plan. Follow up as directed.       RTC IN 5 MONTHS      Xenia Kiran [facility]

## 2022-05-19 ENCOUNTER — NURSE ONLY (OUTPATIENT)
Dept: LAB | Age: 27
End: 2022-05-19

## 2022-05-19 LAB
ANION GAP SERPL CALCULATED.3IONS-SCNC: 14 MEQ/L (ref 8–16)
BUN BLDV-MCNC: 7 MG/DL (ref 7–22)
CALCIUM SERPL-MCNC: 9.3 MG/DL (ref 8.5–10.5)
CHLORIDE BLD-SCNC: 105 MEQ/L (ref 98–111)
CO2: 20 MEQ/L (ref 23–33)
CREAT SERPL-MCNC: 0.6 MG/DL (ref 0.4–1.2)
ERYTHROCYTE [DISTWIDTH] IN BLOOD BY AUTOMATED COUNT: 14 % (ref 11.5–14.5)
ERYTHROCYTE [DISTWIDTH] IN BLOOD BY AUTOMATED COUNT: 47.4 FL (ref 35–45)
GFR SERPL CREATININE-BSD FRML MDRD: > 90 ML/MIN/1.73M2
GLUCOSE BLD-MCNC: 231 MG/DL (ref 70–108)
HCT VFR BLD CALC: 42.8 % (ref 37–47)
HEMOGLOBIN: 13.3 GM/DL (ref 12–16)
MCH RBC QN AUTO: 28.8 PG (ref 26–33)
MCHC RBC AUTO-ENTMCNC: 31.1 GM/DL (ref 32.2–35.5)
MCV RBC AUTO: 92.6 FL (ref 81–99)
PLATELET # BLD: 352 THOU/MM3 (ref 130–400)
PMV BLD AUTO: 10.2 FL (ref 9.4–12.4)
POTASSIUM SERPL-SCNC: 4.6 MEQ/L (ref 3.5–5.2)
RBC # BLD: 4.62 MILL/MM3 (ref 4.2–5.4)
SODIUM BLD-SCNC: 139 MEQ/L (ref 135–145)
WBC # BLD: 14.2 THOU/MM3 (ref 4.8–10.8)

## 2022-06-14 ENCOUNTER — HOSPITAL ENCOUNTER (OUTPATIENT)
Dept: NUCLEAR MEDICINE | Age: 27
Discharge: HOME | End: 2022-06-14
Payer: MEDICAID

## 2022-06-14 DIAGNOSIS — R10.10 UPPER ABDOMINAL PAIN: ICD-10-CM

## 2022-06-14 DIAGNOSIS — R11.0 NAUSEA: ICD-10-CM

## 2022-06-14 PROCEDURE — 78264 GASTRIC EMPTYING IMG STUDY: CPT

## 2022-06-14 PROCEDURE — A9541 TC99M SULFUR COLLOID: HCPCS | Performed by: NURSE PRACTITIONER

## 2022-06-14 PROCEDURE — 3430000000 HC RX DIAGNOSTIC RADIOPHARMACEUTICAL: Performed by: NURSE PRACTITIONER

## 2022-06-14 RX ADMIN — Medication 1 MILLICURIE: at 08:15

## 2022-06-21 ENCOUNTER — APPOINTMENT (OUTPATIENT)
Dept: GENERAL RADIOLOGY | Age: 27
End: 2022-06-21
Attending: STUDENT IN AN ORGANIZED HEALTH CARE EDUCATION/TRAINING PROGRAM
Payer: MEDICAID

## 2022-06-21 ENCOUNTER — ANESTHESIA (OUTPATIENT)
Dept: OPERATING ROOM | Age: 27
End: 2022-06-21
Payer: MEDICAID

## 2022-06-21 ENCOUNTER — ANESTHESIA EVENT (OUTPATIENT)
Dept: OPERATING ROOM | Age: 27
End: 2022-06-21
Payer: MEDICAID

## 2022-06-21 ENCOUNTER — HOSPITAL ENCOUNTER (OUTPATIENT)
Age: 27
Setting detail: OUTPATIENT SURGERY
Discharge: HOME | End: 2022-06-21
Attending: STUDENT IN AN ORGANIZED HEALTH CARE EDUCATION/TRAINING PROGRAM | Admitting: STUDENT IN AN ORGANIZED HEALTH CARE EDUCATION/TRAINING PROGRAM
Payer: MEDICAID

## 2022-06-21 VITALS
WEIGHT: 293 LBS | RESPIRATION RATE: 23 BRPM | OXYGEN SATURATION: 98 % | BODY MASS INDEX: 51.91 KG/M2 | TEMPERATURE: 97.8 F | DIASTOLIC BLOOD PRESSURE: 66 MMHG | HEIGHT: 63 IN | HEART RATE: 107 BPM | SYSTOLIC BLOOD PRESSURE: 127 MMHG

## 2022-06-21 DIAGNOSIS — G89.18 POST-OP PAIN: Primary | ICD-10-CM

## 2022-06-21 LAB — PREGNANCY, URINE: NEGATIVE

## 2022-06-21 PROCEDURE — 2709999900 HC NON-CHARGEABLE SUPPLY: Performed by: STUDENT IN AN ORGANIZED HEALTH CARE EDUCATION/TRAINING PROGRAM

## 2022-06-21 PROCEDURE — 6370000000 HC RX 637 (ALT 250 FOR IP): Performed by: PODIATRIST

## 2022-06-21 PROCEDURE — 7100000001 HC PACU RECOVERY - ADDTL 15 MIN: Performed by: STUDENT IN AN ORGANIZED HEALTH CARE EDUCATION/TRAINING PROGRAM

## 2022-06-21 PROCEDURE — 6360000002 HC RX W HCPCS: Performed by: ANESTHESIOLOGY

## 2022-06-21 PROCEDURE — 3209999900 FLUORO FOR SURGICAL PROCEDURES

## 2022-06-21 PROCEDURE — 2500000003 HC RX 250 WO HCPCS: Performed by: STUDENT IN AN ORGANIZED HEALTH CARE EDUCATION/TRAINING PROGRAM

## 2022-06-21 PROCEDURE — 6370000000 HC RX 637 (ALT 250 FOR IP)

## 2022-06-21 PROCEDURE — 2580000003 HC RX 258: Performed by: PODIATRIST

## 2022-06-21 PROCEDURE — 3600000013 HC SURGERY LEVEL 3 ADDTL 15MIN: Performed by: STUDENT IN AN ORGANIZED HEALTH CARE EDUCATION/TRAINING PROGRAM

## 2022-06-21 PROCEDURE — 81025 URINE PREGNANCY TEST: CPT

## 2022-06-21 PROCEDURE — 3600000003 HC SURGERY LEVEL 3 BASE: Performed by: STUDENT IN AN ORGANIZED HEALTH CARE EDUCATION/TRAINING PROGRAM

## 2022-06-21 PROCEDURE — 7100000010 HC PHASE II RECOVERY - FIRST 15 MIN: Performed by: STUDENT IN AN ORGANIZED HEALTH CARE EDUCATION/TRAINING PROGRAM

## 2022-06-21 PROCEDURE — C1713 ANCHOR/SCREW BN/BN,TIS/BN: HCPCS | Performed by: STUDENT IN AN ORGANIZED HEALTH CARE EDUCATION/TRAINING PROGRAM

## 2022-06-21 PROCEDURE — 3700000001 HC ADD 15 MINUTES (ANESTHESIA): Performed by: STUDENT IN AN ORGANIZED HEALTH CARE EDUCATION/TRAINING PROGRAM

## 2022-06-21 PROCEDURE — 7100000000 HC PACU RECOVERY - FIRST 15 MIN: Performed by: STUDENT IN AN ORGANIZED HEALTH CARE EDUCATION/TRAINING PROGRAM

## 2022-06-21 PROCEDURE — 6360000002 HC RX W HCPCS: Performed by: NURSE ANESTHETIST, CERTIFIED REGISTERED

## 2022-06-21 PROCEDURE — 3700000000 HC ANESTHESIA ATTENDED CARE: Performed by: STUDENT IN AN ORGANIZED HEALTH CARE EDUCATION/TRAINING PROGRAM

## 2022-06-21 PROCEDURE — 2500000003 HC RX 250 WO HCPCS: Performed by: NURSE ANESTHETIST, CERTIFIED REGISTERED

## 2022-06-21 PROCEDURE — 7100000011 HC PHASE II RECOVERY - ADDTL 15 MIN: Performed by: STUDENT IN AN ORGANIZED HEALTH CARE EDUCATION/TRAINING PROGRAM

## 2022-06-21 PROCEDURE — 6360000002 HC RX W HCPCS: Performed by: PODIATRIST

## 2022-06-21 PROCEDURE — 73630 X-RAY EXAM OF FOOT: CPT

## 2022-06-21 DEVICE — 4.5MM STRYKER REELX STT ANCHOR
Type: IMPLANTABLE DEVICE | Status: FUNCTIONAL
Brand: REELX

## 2022-06-21 RX ORDER — PROPOFOL 10 MG/ML
INJECTION, EMULSION INTRAVENOUS PRN
Status: DISCONTINUED | OUTPATIENT
Start: 2022-06-21 | End: 2022-06-21 | Stop reason: SDUPTHER

## 2022-06-21 RX ORDER — OXYCODONE HYDROCHLORIDE AND ACETAMINOPHEN 5; 325 MG/1; MG/1
1 TABLET ORAL EVERY 6 HOURS PRN
Qty: 20 TABLET | Refills: 0 | Status: SHIPPED | OUTPATIENT
Start: 2022-06-21 | End: 2022-06-26

## 2022-06-21 RX ORDER — SODIUM CHLORIDE 0.9 % (FLUSH) 0.9 %
5-40 SYRINGE (ML) INJECTION EVERY 12 HOURS SCHEDULED
Status: DISCONTINUED | OUTPATIENT
Start: 2022-06-21 | End: 2022-06-21 | Stop reason: HOSPADM

## 2022-06-21 RX ORDER — IPRATROPIUM BROMIDE AND ALBUTEROL SULFATE 2.5; .5 MG/3ML; MG/3ML
SOLUTION RESPIRATORY (INHALATION)
Status: COMPLETED
Start: 2022-06-21 | End: 2022-06-21

## 2022-06-21 RX ORDER — MIDAZOLAM HYDROCHLORIDE 1 MG/ML
INJECTION INTRAMUSCULAR; INTRAVENOUS PRN
Status: DISCONTINUED | OUTPATIENT
Start: 2022-06-21 | End: 2022-06-21 | Stop reason: SDUPTHER

## 2022-06-21 RX ORDER — SODIUM CHLORIDE 9 MG/ML
INJECTION, SOLUTION INTRAVENOUS PRN
Status: DISCONTINUED | OUTPATIENT
Start: 2022-06-21 | End: 2022-06-21 | Stop reason: HOSPADM

## 2022-06-21 RX ORDER — PHENYLEPHRINE HYDROCHLORIDE 10 MG/ML
INJECTION INTRAVENOUS PRN
Status: DISCONTINUED | OUTPATIENT
Start: 2022-06-21 | End: 2022-06-21 | Stop reason: SDUPTHER

## 2022-06-21 RX ORDER — FENTANYL CITRATE 50 UG/ML
25 INJECTION, SOLUTION INTRAMUSCULAR; INTRAVENOUS EVERY 5 MIN PRN
Status: DISCONTINUED | OUTPATIENT
Start: 2022-06-21 | End: 2022-06-21 | Stop reason: HOSPADM

## 2022-06-21 RX ORDER — SUCCINYLCHOLINE/SOD CL,ISO/PF 200MG/10ML
SYRINGE (ML) INTRAVENOUS PRN
Status: DISCONTINUED | OUTPATIENT
Start: 2022-06-21 | End: 2022-06-21 | Stop reason: SDUPTHER

## 2022-06-21 RX ORDER — OXYCODONE HYDROCHLORIDE 5 MG/1
5 TABLET ORAL EVERY 4 HOURS PRN
Status: DISCONTINUED | OUTPATIENT
Start: 2022-06-21 | End: 2022-06-21 | Stop reason: HOSPADM

## 2022-06-21 RX ORDER — BUPIVACAINE HYDROCHLORIDE 5 MG/ML
INJECTION, SOLUTION EPIDURAL; INTRACAUDAL PRN
Status: DISCONTINUED | OUTPATIENT
Start: 2022-06-21 | End: 2022-06-21 | Stop reason: ALTCHOICE

## 2022-06-21 RX ORDER — IPRATROPIUM BROMIDE AND ALBUTEROL SULFATE 2.5; .5 MG/3ML; MG/3ML
1 SOLUTION RESPIRATORY (INHALATION) ONCE
Status: COMPLETED | OUTPATIENT
Start: 2022-06-21 | End: 2022-06-21

## 2022-06-21 RX ORDER — DEXAMETHASONE SODIUM PHOSPHATE 10 MG/ML
INJECTION, EMULSION INTRAMUSCULAR; INTRAVENOUS PRN
Status: DISCONTINUED | OUTPATIENT
Start: 2022-06-21 | End: 2022-06-21 | Stop reason: SDUPTHER

## 2022-06-21 RX ORDER — SODIUM CHLORIDE 0.9 % (FLUSH) 0.9 %
5-40 SYRINGE (ML) INJECTION PRN
Status: DISCONTINUED | OUTPATIENT
Start: 2022-06-21 | End: 2022-06-21 | Stop reason: HOSPADM

## 2022-06-21 RX ORDER — RIVAROXABAN 10 MG/1
10 TABLET, FILM COATED ORAL
Qty: 30 TABLET | Refills: 0 | Status: SHIPPED | OUTPATIENT
Start: 2022-06-21 | End: 2022-08-16 | Stop reason: ALTCHOICE

## 2022-06-21 RX ORDER — DIPHENHYDRAMINE HYDROCHLORIDE 50 MG/ML
12.5 INJECTION INTRAMUSCULAR; INTRAVENOUS
Status: DISCONTINUED | OUTPATIENT
Start: 2022-06-21 | End: 2022-06-21 | Stop reason: HOSPADM

## 2022-06-21 RX ORDER — SODIUM CHLORIDE 9 MG/ML
INJECTION, SOLUTION INTRAVENOUS CONTINUOUS
Status: DISCONTINUED | OUTPATIENT
Start: 2022-06-21 | End: 2022-06-21 | Stop reason: HOSPADM

## 2022-06-21 RX ORDER — LIDOCAINE HYDROCHLORIDE 20 MG/ML
INJECTION, SOLUTION EPIDURAL; INFILTRATION; INTRACAUDAL; PERINEURAL PRN
Status: DISCONTINUED | OUTPATIENT
Start: 2022-06-21 | End: 2022-06-21 | Stop reason: SDUPTHER

## 2022-06-21 RX ORDER — FENTANYL CITRATE 50 UG/ML
50 INJECTION, SOLUTION INTRAMUSCULAR; INTRAVENOUS EVERY 5 MIN PRN
Status: DISCONTINUED | OUTPATIENT
Start: 2022-06-21 | End: 2022-06-21 | Stop reason: HOSPADM

## 2022-06-21 RX ORDER — MEPERIDINE HYDROCHLORIDE 25 MG/ML
12.5 INJECTION INTRAMUSCULAR; INTRAVENOUS; SUBCUTANEOUS EVERY 5 MIN PRN
Status: DISCONTINUED | OUTPATIENT
Start: 2022-06-21 | End: 2022-06-21 | Stop reason: HOSPADM

## 2022-06-21 RX ORDER — ONDANSETRON 2 MG/ML
INJECTION INTRAMUSCULAR; INTRAVENOUS PRN
Status: DISCONTINUED | OUTPATIENT
Start: 2022-06-21 | End: 2022-06-21 | Stop reason: SDUPTHER

## 2022-06-21 RX ORDER — CYCLOBENZAPRINE HCL 10 MG
10 TABLET ORAL 3 TIMES DAILY PRN
Qty: 30 TABLET | Refills: 0 | Status: SHIPPED | OUTPATIENT
Start: 2022-06-21 | End: 2022-07-01

## 2022-06-21 RX ORDER — ROCURONIUM BROMIDE 10 MG/ML
INJECTION, SOLUTION INTRAVENOUS PRN
Status: DISCONTINUED | OUTPATIENT
Start: 2022-06-21 | End: 2022-06-21 | Stop reason: SDUPTHER

## 2022-06-21 RX ORDER — FENTANYL CITRATE 50 UG/ML
INJECTION, SOLUTION INTRAMUSCULAR; INTRAVENOUS PRN
Status: DISCONTINUED | OUTPATIENT
Start: 2022-06-21 | End: 2022-06-21 | Stop reason: SDUPTHER

## 2022-06-21 RX ORDER — OXYCODONE HYDROCHLORIDE 5 MG/1
10 TABLET ORAL EVERY 4 HOURS PRN
Status: DISCONTINUED | OUTPATIENT
Start: 2022-06-21 | End: 2022-06-21 | Stop reason: HOSPADM

## 2022-06-21 RX ADMIN — FENTANYL CITRATE 100 MCG: 50 INJECTION, SOLUTION INTRAMUSCULAR; INTRAVENOUS at 07:34

## 2022-06-21 RX ADMIN — Medication 3000 MG: at 07:37

## 2022-06-21 RX ADMIN — SODIUM CHLORIDE: 9 INJECTION, SOLUTION INTRAVENOUS at 07:34

## 2022-06-21 RX ADMIN — DEXAMETHASONE SODIUM PHOSPHATE 10 MG: 10 INJECTION, EMULSION INTRAMUSCULAR; INTRAVENOUS at 07:50

## 2022-06-21 RX ADMIN — FENTANYL CITRATE 25 MCG: 50 INJECTION, SOLUTION INTRAMUSCULAR; INTRAVENOUS at 08:47

## 2022-06-21 RX ADMIN — IPRATROPIUM BROMIDE AND ALBUTEROL SULFATE 1 AMPULE: .5; 3 SOLUTION RESPIRATORY (INHALATION) at 08:36

## 2022-06-21 RX ADMIN — FENTANYL CITRATE 25 MCG: 50 INJECTION, SOLUTION INTRAMUSCULAR; INTRAVENOUS at 08:38

## 2022-06-21 RX ADMIN — Medication 200 MG: at 07:34

## 2022-06-21 RX ADMIN — PHENYLEPHRINE HYDROCHLORIDE 100 MCG: 10 INJECTION INTRAVENOUS at 07:44

## 2022-06-21 RX ADMIN — IPRATROPIUM BROMIDE AND ALBUTEROL SULFATE 1 AMPULE: 2.5; .5 SOLUTION RESPIRATORY (INHALATION) at 08:36

## 2022-06-21 RX ADMIN — ROCURONIUM BROMIDE 10 MG: 50 INJECTION, SOLUTION INTRAVENOUS at 07:34

## 2022-06-21 RX ADMIN — LIDOCAINE HYDROCHLORIDE 80 MG: 20 INJECTION, SOLUTION EPIDURAL; INFILTRATION; INTRACAUDAL; PERINEURAL at 07:34

## 2022-06-21 RX ADMIN — ONDANSETRON 4 MG: 2 INJECTION INTRAMUSCULAR; INTRAVENOUS at 08:10

## 2022-06-21 RX ADMIN — SUGAMMADEX 200 MG: 100 INJECTION, SOLUTION INTRAVENOUS at 08:12

## 2022-06-21 RX ADMIN — PROPOFOL 200 MG: 10 INJECTION, EMULSION INTRAVENOUS at 07:34

## 2022-06-21 RX ADMIN — OXYCODONE 5 MG: 5 TABLET ORAL at 09:43

## 2022-06-21 RX ADMIN — FENTANYL CITRATE 25 MCG: 50 INJECTION, SOLUTION INTRAMUSCULAR; INTRAVENOUS at 08:00

## 2022-06-21 RX ADMIN — MIDAZOLAM 2 MG: 1 INJECTION INTRAMUSCULAR; INTRAVENOUS at 07:34

## 2022-06-21 RX ADMIN — FENTANYL CITRATE 25 MCG: 50 INJECTION, SOLUTION INTRAMUSCULAR; INTRAVENOUS at 08:05

## 2022-06-21 RX ADMIN — FENTANYL CITRATE 50 MCG: 50 INJECTION, SOLUTION INTRAMUSCULAR; INTRAVENOUS at 07:54

## 2022-06-21 RX ADMIN — ROCURONIUM BROMIDE 20 MG: 50 INJECTION, SOLUTION INTRAVENOUS at 07:40

## 2022-06-21 ASSESSMENT — PAIN DESCRIPTION - ORIENTATION
ORIENTATION: RIGHT

## 2022-06-21 ASSESSMENT — PAIN - FUNCTIONAL ASSESSMENT: PAIN_FUNCTIONAL_ASSESSMENT: 0-10

## 2022-06-21 ASSESSMENT — ENCOUNTER SYMPTOMS: DYSPNEA ACTIVITY LEVEL: NO INTERVAL CHANGE

## 2022-06-21 ASSESSMENT — PAIN SCALES - GENERAL
PAINLEVEL_OUTOF10: 8
PAINLEVEL_OUTOF10: 5
PAINLEVEL_OUTOF10: 5
PAINLEVEL_OUTOF10: 8

## 2022-06-21 ASSESSMENT — PAIN DESCRIPTION - LOCATION
LOCATION: FOOT

## 2022-06-21 ASSESSMENT — LIFESTYLE VARIABLES: SMOKING_STATUS: 1

## 2022-06-21 NOTE — PROGRESS NOTES
4374: Patient arrived to PACU. Report received from Omkar Freitas CRNA and Omkar Dias RN. Patient placed on cardiac monitor. Pt placed on 6L per nc and encouraged to take deep breaths. Surgical boot to right foot. Elevated on pillow. Ice pack placed behind knee. Patient drowsy. Wake sup when name is called. 8043: patient on 6L per nc. HOB elevated and patient encouraged to cough and deep breathe. Patient states chest feels a little tight and it is hard to take a deep breath. Discussed with Dr. Althea Ching. Orders for duoneb once. Started at this time. 3565: Fentanyl 25mcg fentanyl given at this time. Pt rates pain 8/10. Smaller dose of fentanyl given due to oxygen level. Patient receiving breathing treatment at this time. 1150: Breathing tx complete. Patient encouraged to cough and deep breathe. Placed on 4L per nc.   6867: Fentanyl given to patient. Rates pain 8/10. Grimacing in pain. Giving 25mcg dose due to oxygen level. 6264: Pt rates pain 5/10. Patient remains on 4L per nc. Drowsy at times now. Patient encouraged to take deep breaths. 5095: Incentive spirometer given to patient and instructions on how to use. Pt was able to get up to 750ml. Will keep working with patient. Weaned to 3L per nc.   0904: Pulse ox moved to patient's earlobe. Sat 98% on 3L. Weaned to 2L. 0284: pt placed on ra. 0920: Pt sats mid-high 90's on room air on earlobe. 90-91% on finger. 1417: Pt moved to phase II room. Grandmother at bedside. Patient given snack and drink. 1719: patient rates pain 5/10. Tolerating snack and drink. Patient given pain medication at this time. 1005: Pt and grandmother given discharge instructions. All questions and concerns answered. IV removed. 1010: Helped patient get dressed and into wheelchair. Patient taken to bathroom. 1020: Pt meets discharge criteria. Patient taken to car passenger seat in stable condition via wheelchair by this RN.

## 2022-06-21 NOTE — H&P
[address] [address]  History and Physical Update    Pt Name: Luis Angel Clement  MRN: [id number]  YOB: 1995  Date of evaluation: 6/21/2022    I have examined the patient and reviewed the H&P/Consult and there are no changes to the patient or plans.       Miguelangel Reinoso DPM,  Electronically signed 6/21/2022 at 7:18 AM

## 2022-06-21 NOTE — ANESTHESIA PRE PROCEDURE
Department of Anesthesiology  Preprocedure Note       Name:  Conner King   Age:  32 y.o.  :  1995                                          MRN:  [id number]         Date:  2022      Surgeon: Ashley Stone):  Alley Cespedes DPM    Procedure: Procedure(s):  Right Foot Excision of Accessory Navicular with Reconstruction of Posterior Tibial Tendon    Medications prior to admission:   Prior to Admission medications    Medication Sig Start Date End Date Taking? Authorizing Provider   Probiotic Product (PROBIOTIC-10 PO) Take by mouth    Historical Provider, MD   metoprolol tartrate (LOPRESSOR) 50 MG tablet Take 1 tablet by mouth 2 times daily 22   Keiko Alvarez MD   Multiple Vitamins-Minerals (THERAPEUTIC MULTIVITAMIN-MINERALS) tablet Take 1 tablet by mouth daily    Historical Provider, MD   acetaminophen (TYLENOL) 500 MG tablet Take 1,000 mg by mouth every morning    Historical Provider, MD   cloZAPine (CLOZARIL) 25 MG tablet  21   Historical Provider, MD   sucralfate (CARAFATE) 1 GM tablet Take 1 tablet by mouth 2 times daily 5-60\" before lunch and dinner 22   [place]er MD   benztropine (COGENTIN) 0.5 MG tablet Take 0.5 mg by mouth 2 times daily    Historical Provider, MD   prazosin (MINIPRESS) 2 MG capsule Take 2 mg by mouth \"Takes for dreams\" 21   Historical Provider, MD   cloZAPine (CLOZARIL) 100 MG tablet Take 125 mg by mouth 2 times daily  10/27/21   Historical Provider, MD   ondansetron (ZOFRAN) 4 MG tablet Take 1 tablet by mouth every 12 hours as needed for Nausea or Vomiting 10/13/21   NAMAN Frey CNP   busPIRone (BUSPAR) 15 MG tablet 15 mg 4 times daily  21   Historical Provider, MD   pantoprazole (PROTONIX) 40 MG tablet Take 1 tablet by mouth every morning (before breakfast) 21   NAMAN Frey CNP   clonazePAM (KLONOPIN) 0.5 MG tablet Take 0.5 mg by mouth 2 times daily as needed for Anxiety.      Historical Provider, MD   paliperidone palmitate ER (INVEGA SUSTENNA) 234 MG/1.5ML FABIANA IM injection Inject into the muscle every 30 days     Historical Provider, MD       Current medications:    Current Facility-Administered Medications   Medication Dose Route Frequency Provider Last Rate Last Admin    0.9 % sodium chloride infusion   IntraVENous Continuous Ellfabio Iqbal, DPM        sodium chloride flush 0.9 % injection 5-40 mL  5-40 mL IntraVENous 2 times per day Steve Iqbal, DPM        sodium chloride flush 0.9 % injection 5-40 mL  5-40 mL IntraVENous PRN Steve Ambrocioy, DPM        0.9 % sodium chloride infusion   IntraVENous PRN Steve Iqbal, DPM        ceFAZolin (ANCEF) 3000 mg in dextrose 5 % 100 mL IVPB  3,000 mg IntraVENous On Call to Kristie 42, DPM           Allergies:  No Known Allergies    Problem List:    Patient Active Problem List   Diagnosis Code    Schizophrenia ([facility] F20.9    Tachycardia R00.0    Chest pain R07.9    Sepsis ([facility] A41.9    Leukocytosis D72.829    Normocytic anemia D64.9    Bilateral atelectasis G89.28    Metabolic acidosis P38.8    hX OF Atypical chest pain- CHRONIC, CORONARY CTA WNL R07.89    Morbid obesity (HCC) E66.01    Bipolar 1 disorder (HCC) F31.9    Tobacco abuse Z72.0    BMI 40.0-44.9, adult ([facility]) B77.60    Biliary colic Y93.28    Intermittent palpitations R00.2    SOB (shortness of breath) on exertion R06.02    Sinus tachycardia R00.0    Inappropriate sinus tachycardia R00.0    Dizziness on standing R42       Past Medical History:        Diagnosis Date    Arthritis     Bipolar 1 disorder (HCC)     Fibromyalgia     PONV (postoperative nausea and vomiting)     Pre-diabetes     Psychosis ([facility]     Schizophrenia ([facility]     Stomach pain        Past Surgical History:        Procedure Laterality Date    CHOLECYSTECTOMY, LAPAROSCOPIC N/A 5/5/2021    ROBOTIC CHOLECYSTECTOMY performed by Curly Wright MD at [place] [address]  02/2021    Dr. Rigo Elizondo EGD  02/2021    [place]  FOOT TENDON SURGERY Left 9/20/2021    LEFT FOOT EXCISION OF ACCESSORY NAVICULAR WITH RECONSTRUCTION OF POSTERIOR TIBIAL TENDON performed by Camila Flaherty DPM at [address] [facility] N/A 2/7/2022    EGD DILATION SAVORY performed by Golden Velez MD at [address] End[facility]    UPPER GASTROINTESTINAL ENDOSCOPY Left 2/7/2022    EGD BIOPSY performed by Golden Velez MD at [address] [facility] EXTRACTION         Social History:    Social History     Tobacco Use    Smoking status: Current Every Day Smoker     Packs/day: 0.50     Types: Cigarettes    Smokeless tobacco: Never Used   Substance Use Topics    Alcohol use: Not Currently                                Ready to quit: Not Answered  Counseling given: Not Answered      Vital Signs (Current):   Vitals:    06/14/22 1104 06/21/22 0649 06/21/22 0655   BP:  130/75    Pulse:  (!) 116 (!) 116   Resp:  16    Temp:  (!) 96.7 °F (35.9 °C)    TempSrc:  Temporal    SpO2:  96%    Weight: 300 lb (136.1 kg) 298 lb (135.2 kg)    Height: 5' 3\" (1.6 m) 5' 3\" (1.6 m)                                               BP Readings from Last 3 Encounters:   06/21/22 130/75   05/17/22 (!) 134/91   03/15/22 132/80       NPO Status: Time of last liquid consumption: 2300                        Time of last solid consumption: 2200                        Date of last liquid consumption: 06/20/22                        Date of last solid food consumption: 06/20/22    BMI:   Wt Readings from Last 3 Encounters:   06/21/22 298 lb (135.2 kg)   05/17/22 295 lb 9.6 oz (134.1 kg)   03/15/22 293 lb 6.4 oz (133.1 kg)     Body mass index is 52.79 kg/m².     CBC:   Lab Results   Component Value Date    WBC 14.2 05/19/2022    RBC 4.62 05/19/2022    HGB 13.3 05/19/2022    HCT 42.8 05/19/2022    MCV 92.6 05/19/2022     05/19/2022       CMP:   Lab Results   Component Value Date     05/19/2022    K 4.6 05/19/2022    K 4.0 05/27/2021     05/19/2022 CO2 20 05/19/2022    BUN 7 05/19/2022    CREATININE 0.6 05/19/2022    LABGLOM >90 05/19/2022    GLUCOSE 231 05/19/2022    PROT 6.9 07/16/2021    CALCIUM 9.3 05/19/2022    BILITOT 0.2 02/08/2022    ALKPHOS 107 02/08/2022    AST 16 02/08/2022    ALT 22 02/08/2022       POC Tests: No results for input(s): POCGLU, POCNA, POCK, POCCL, POCBUN, POCHEMO, POCHCT in the last 72 hours. Coags: No results found for: PROTIME, INR, APTT    HCG (If Applicable):   Lab Results   Component Value Date    PREGTESTUR negative 06/21/2022    PREGSERUM NEGATIVE 07/16/2021        ABGs: No results found for: PHART, PO2ART, DBK5CLZ, IIS1BOF, BEART, D1BSIPDX     Type & Screen (If Applicable):  No results found for: LABABO, LABRH    Drug/Infectious Status (If Applicable):  No results found for: HIV, HEPCAB    COVID-19 Screening (If Applicable):   Lab Results   Component Value Date    COVID19 Not Detected 04/29/2021           Anesthesia Evaluation  Patient summary reviewed   history of anesthetic complications: PONV. Airway: Mallampati: III  TM distance: >3 FB   Neck ROM: full  Mouth opening: > = 3 FB   Dental:    (+) caps and partials  Comment: Partial out    Pulmonary:   (+) decreased breath sounds current smoker    (-) recent URI                           Cardiovascular:    (+) LEI: no interval change,         Rhythm: regular  Rate: normal                    Neuro/Psych:   (+) neuromuscular disease (fibromyalgia):, psychiatric history:            GI/Hepatic/Renal:   (+) morbid obesity (BMI 52.8)         ROS comment: Stomach pain after eating, recently diagnoses with gastroparesis. Endo/Other:                     Abdominal:   (+) obese,           Vascular: Other Findings:           Anesthesia Plan      general     ASA 3       Induction: intravenous. MIPS: Prophylactic antiemetics administered. Anesthetic plan and risks discussed with patient. Plan discussed with CRNA.                     Deshawn Morrison MD 6/21/2022

## 2022-06-21 NOTE — BRIEF OP NOTE
Brief Postoperative Note      Patient: Adrianne Romero  YOB: 1995  MRN: [id number]    Date of Procedure: 6/21/2022    Pre-Op Diagnosis: Right foot pain Posterior tibial tendinitis of leg Accessory navicular right foot    Post-Op Diagnosis: Same       Procedure(s):  Right Foot Excision of Accessory Navicular with Reconstruction of Posterior Tibial Tendon    Surgeon(s):  Jennifer Echeverria DPM    Assistant:  Resident: Miranda Marquez DPM    Anesthesia: General    Estimated Blood Loss (mL): Minimal    Complications: None    Specimens:   * No specimens in log *    Implants:  Implant Name Type Inv. Item Serial No.  Lot No. LRB No. Used Action   ANCHOR SUT DIA4. 5MM KNOTLESS PEEK REELX STT - [id number]  ANCHOR SUT DIA4. 5MM KNOTLESS PEEK REELX STT  [employer] ENDOSCOPY-[id number] 1 Implanted         Drains: * No LDAs found *    Findings: Consistent with diagnosis    Materials: 3-0 Vicryl 3-0 Monocryl 4-0 Prolene    Hemostasis: Thigh pneumatic tourniquet inflated to a pressure of 300mmHg    Injectables: 10cc 0.5% marcaine plain    Electronically signed by Mitra Aguilar DPM on 6/21/2022 at 8:19 AM

## 2022-06-21 NOTE — ANESTHESIA POSTPROCEDURE EVALUATION
Department of Anesthesiology  Postprocedure Note    Patient: Luis Angel Clement  MRN: [id number]  YOB: 1995  Date of evaluation: 6/21/2022      Procedure Summary     Date: 06/21/22 Room / Location: [address] [place] [address] 02 / 138 [facility]    Anesthesia Start: 0730 Anesthesia Stop: 0799    Procedure: Right Foot Excision of Accessory Navicular with Reconstruction of Posterior Tibial Tendon (Right ) Diagnosis: (Right foot pain Posterior tibial tendinitis of leg Accessory navicular right foot)    Surgeons: Miguelangel Reinoso DPM Responsible Provider: Astrid Hinson MD    Anesthesia Type: general ASA Status: 3          Anesthesia Type: No value filed. Veronica Phase I: Veronica Score: 10    Veronica Phase II: Veronica Score: 10    Last vitals:   Vitals Value Taken Time   /65 06/21/22 0915   Temp 97.8 °F (36.6 °C) 06/21/22 0823   Pulse 108 06/21/22 0914   Resp 21 06/21/22 0914   SpO2 98 % 06/21/22 0914   Vitals shown include unvalidated device data.       Anesthesia Post Evaluation    Patient location during evaluation: PACU  Patient participation: complete - patient participated  Level of consciousness: awake  Nausea & Vomiting: no nausea  Complications: no  Cardiovascular status: hemodynamically stable  Respiratory status: spontaneous ventilation

## 2022-06-21 NOTE — OP NOTE
Operative Note      Patient: Jose De Jesus Colon  YOB: 1995  MRN: [id number]    Date of Procedure: 6/21/2022    Pre-Op Diagnosis: Right foot pain Posterior tibial tendinitis of leg Accessory navicular right foot    Post-Op Diagnosis: Same       Procedure(s):  Right Foot Excision of Accessory Navicular with Reconstruction of Posterior Tibial Tendon    Surgeon(s):  Joshua Veloz DPM    Assistant:   Resident: Yolanda Whitten DPM    Anesthesia: General    Estimated Blood Loss (mL): Minimal    Complications: None    Specimens:   * No specimens in log *    Implants:  Implant Name Type Inv. Item Serial No.  Lot No. LRB No. Used Action   ANCHOR SUT DIA4. 5MM KNOTLESS PEEK REELX STT - [id number]  ANCHOR SUT DIA4. 5MM KNOTLESS PEEK REELX STT  [employer] ENDOSCOPY-WD [id number] 1 Implanted         Drains: * No LDAs found *    Findings: consistent with diagnosis     Detailed Description of Procedure: Indications: Patient is a 32 y.o. female with a chief complaint of right foot pain being treated for right foot accessory navicular and posterior tibial tendinitis . At this time all conservative options have been exhausted and surgical intervention is necessary. The procedure has been explained to the patient and they understand the risks, benefits and possible complications including bleeding, infection, recurrence, hardware failure, CRPS, nerve damage, loss of limb, loss of life. No promises have been made as to surgical outcome. Procedure: The patient was transported from the pre-op holding to the operating room and placed in a supine position. A pneumatic thigh tourniquet was applied to the right thight. The right foot was then prepped and draped in the normal aspetic manner. The right foot was then exsanguinated with an esmark and the tourniquet was inflated to 300 mmHg. Attention was then directed to the medial aspect of the right foot.   A skin marker was used to create initial incision from the TN joint distally 5cm. A scalpel blade was then used to make a full thickness incision making sure to keep the blade perpendicular to the skin on initial incision. Blunt dissection was then carried down to the layer of the posterior tibial tendon sheath. It is important to note that all small bleeding vessels were properly cauterized using a Bovie. Following blunt dissection, a U-shaped tenotomy was performed along the most distal aspect of the insertion site of the tibialis posterior tendon on the medial aspect of the navicula. There was thickening of the tendon noted. The thick, diseased portion of the tendon was resected. A TPS sagittal saw then used to create a smooth medial surface to the navicular allowing for adequate visualization of cancellous bone. A whip stitch was thrown on the distal aspect of the posterior tibial tendon. Under c arm the reelx anchor was pre drilled making sure to avoid any insult to the surrounding joints. The suture off the whip stitch was then inserted into the reelx anchor. The anchor was then placed within the navicular and tensioned to have the posterior tibial tendon in contact with the navicular. Adequate placement of the anchor was then noted on c arm. The area was then flushed and the tendon sheath was closed with 3-0 vicryl. The incision was flushed with copious amounts of sterile saline. The subcutaneous tissues were re-appoximated with 3-0 vicryl and 4-0 monocryl. The skin was closed using 4-0 prolene. A post-op injection of 10 cc's of 0.5% marcaine plain was injected. The incision was dressed with adaptic, 4x4's, kerlix, etc.  The pneumatic thigh tourniquet was then deflated and an immediate hyperemic response was noted to all digits of the right foot. A CAM boot was then applied. The patient was transported to the PACU with VSS and NVS intact to all digits of the right foot. No complications were noted throughout the procedure.   The patient is to be discharged per PACU protocol.      Dr. Anjana Shannon DPM      Electronically signed by Litzy Maldonado DPM on 6/21/2022 at 8:21 AM

## 2022-07-27 ENCOUNTER — NURSE ONLY (OUTPATIENT)
Dept: LAB | Age: 27
End: 2022-07-27

## 2022-07-27 DIAGNOSIS — E28.2 PCOS (POLYCYSTIC OVARIAN SYNDROME): ICD-10-CM

## 2022-07-27 LAB
ANION GAP SERPL CALCULATED.3IONS-SCNC: 16 MEQ/L (ref 8–16)
AVERAGE GLUCOSE: 153 MG/DL (ref 70–126)
BUN BLDV-MCNC: 7 MG/DL (ref 7–22)
CALCIUM SERPL-MCNC: 10.1 MG/DL (ref 8.5–10.5)
CHLORIDE BLD-SCNC: 102 MEQ/L (ref 98–111)
CO2: 23 MEQ/L (ref 23–33)
CREAT SERPL-MCNC: 0.7 MG/DL (ref 0.4–1.2)
GFR SERPL CREATININE-BSD FRML MDRD: > 90 ML/MIN/1.73M2
GLUCOSE BLD-MCNC: 100 MG/DL (ref 70–108)
HBA1C MFR BLD: 7.1 % (ref 4.4–6.4)
POTASSIUM SERPL-SCNC: 4.7 MEQ/L (ref 3.5–5.2)
SODIUM BLD-SCNC: 141 MEQ/L (ref 135–145)

## 2022-07-28 LAB — DHEAS (DHEA SULFATE): 347 UG/DL (ref 65–380)

## 2022-08-04 LAB — 17-HYDROXYPROGESTERONE: 15.21 NG/DL

## 2022-08-06 NOTE — RESULT ENCOUNTER NOTE
Dear Buddy Kimball,  This is to let you know that your lab test(s) were abnormal.  Please contact my office as soon as possible for additional recommendations

## 2022-08-10 LAB — TESTOSTERONE, FEMALES/CHILDREN: 22 NG/DL (ref 9–55)

## 2022-08-16 ENCOUNTER — NURSE ONLY (OUTPATIENT)
Dept: LAB | Age: 27
End: 2022-08-16

## 2022-08-16 ENCOUNTER — OFFICE VISIT (OUTPATIENT)
Dept: INTERNAL MEDICINE CLINIC | Age: 27
End: 2022-08-16
Payer: MEDICAID

## 2022-08-16 VITALS
TEMPERATURE: 97.5 F | DIASTOLIC BLOOD PRESSURE: 82 MMHG | WEIGHT: 293 LBS | SYSTOLIC BLOOD PRESSURE: 134 MMHG | HEART RATE: 106 BPM | BODY MASS INDEX: 53.57 KG/M2

## 2022-08-16 DIAGNOSIS — E11.43 GASTROPARESIS DUE TO DM (HCC): Primary | ICD-10-CM

## 2022-08-16 DIAGNOSIS — K31.84 GASTROPARESIS DUE TO DM (HCC): Primary | ICD-10-CM

## 2022-08-16 DIAGNOSIS — E11.9 TYPE 2 DIABETES MELLITUS NOT AT GOAL (HCC): ICD-10-CM

## 2022-08-16 PROBLEM — R10.9 STOMACH PAIN: Status: ACTIVE | Noted: 2022-08-16

## 2022-08-16 PROBLEM — R73.03 PRE-DIABETES: Status: ACTIVE | Noted: 2022-08-16

## 2022-08-16 PROCEDURE — 3051F HG A1C>EQUAL 7.0%<8.0%: CPT

## 2022-08-16 PROCEDURE — G8427 DOCREV CUR MEDS BY ELIG CLIN: HCPCS | Performed by: INTERNAL MEDICINE

## 2022-08-16 PROCEDURE — G8417 CALC BMI ABV UP PARAM F/U: HCPCS | Performed by: INTERNAL MEDICINE

## 2022-08-16 PROCEDURE — 2022F DILAT RTA XM EVC RTNOPTHY: CPT | Performed by: INTERNAL MEDICINE

## 2022-08-16 PROCEDURE — 4004F PT TOBACCO SCREEN RCVD TLK: CPT | Performed by: INTERNAL MEDICINE

## 2022-08-16 PROCEDURE — 99203 OFFICE O/P NEW LOW 30 MIN: CPT

## 2022-08-16 RX ORDER — CETIRIZINE HCL 10 MG/1
1 CAPSULE ORAL DAILY
COMMUNITY

## 2022-08-16 ASSESSMENT — ENCOUNTER SYMPTOMS
CHEST TIGHTNESS: 0
SORE THROAT: 1
BLOOD IN STOOL: 0
VOMITING: 0
SHORTNESS OF BREATH: 0
FACIAL SWELLING: 1
ABDOMINAL DISTENTION: 1
CONSTIPATION: 1
NAUSEA: 1
BACK PAIN: 1
DIARRHEA: 0
COLOR CHANGE: 0
COUGH: 0
ABDOMINAL PAIN: 1

## 2022-08-16 ASSESSMENT — PATIENT HEALTH QUESTIONNAIRE - PHQ9
9. THOUGHTS THAT YOU WOULD BE BETTER OFF DEAD, OR OF HURTING YOURSELF: NOT AT ALL
4. FEELING TIRED OR HAVING LITTLE ENERGY: NEARLY EVERY DAY
SUM OF ALL RESPONSES TO PHQ QUESTIONS 1-9: 10
6. FEELING BAD ABOUT YOURSELF - OR THAT YOU ARE A FAILURE OR HAVE LET YOURSELF OR YOUR FAMILY DOWN: SEVERAL DAYS
5. POOR APPETITE OR OVEREATING: SEVERAL DAYS
DEPRESSION UNABLE TO ASSESS: YES
8. MOVING OR SPEAKING SO SLOWLY THAT OTHER PEOPLE COULD HAVE NOTICED. OR THE OPPOSITE, BEING SO FIGETY OR RESTLESS THAT YOU HAVE BEEN MOVING AROUND A LOT MORE THAN USUAL: SEVERAL DAYS
7. TROUBLE CONCENTRATING ON THINGS, SUCH AS READING THE NEWSPAPER OR WATCHING TELEVISION: SEVERAL DAYS
3. TROUBLE FALLING OR STAYING ASLEEP: NOT AT ALL
SUM OF ALL RESPONSES TO PHQ9 QUESTIONS 1 & 2: 3
1. LITTLE INTEREST OR PLEASURE IN DOING THINGS: NEARLY EVERY DAY
2. FEELING DOWN, DEPRESSED OR HOPELESS: NOT AT ALL

## 2022-08-17 LAB — C-PEPTIDE: 15 NG/ML (ref 1.1–4.4)

## 2022-08-22 ENCOUNTER — OFFICE VISIT (OUTPATIENT)
Dept: INTERNAL MEDICINE CLINIC | Age: 27
End: 2022-08-22
Payer: MEDICAID

## 2022-08-22 VITALS — HEIGHT: 63 IN | BODY MASS INDEX: 51.91 KG/M2 | WEIGHT: 293 LBS | TEMPERATURE: 97.3 F

## 2022-08-22 DIAGNOSIS — E11.43 DIABETIC GASTROPARESIS (HCC): Primary | ICD-10-CM

## 2022-08-22 DIAGNOSIS — K31.84 DIABETIC GASTROPARESIS (HCC): Primary | ICD-10-CM

## 2022-08-22 PROCEDURE — 97802 MEDICAL NUTRITION INDIV IN: CPT | Performed by: DIETITIAN, REGISTERED

## 2022-08-22 NOTE — PROGRESS NOTES
[address] [place] [address]. [address] [place] [address]., [facility], [address]  [phone] (phone)  [phone] (fax)    Patient Name: Kesha Lentz. Date of Birth: [id number]. MRN: [id number]      Assessment: Patient is a 32 y.o. female seen for Initial MNT visit for gastroparesis d/t Diabetes. -Nutritionally relevant labs:   Lab Results   Component Value Date/Time    LABA1C 7.1 (H) 07/27/2022 01:18 PM    GLUCOSE 100 07/27/2022 01:18 PM    GLUCOSE 146 06/28/2022 12:00 AM     7/9/2019 RD visit for obesity and fam. Hx of metabolic syndrome:  Pt states recent dx of schizophrenia  -Food recall:   Pt states she is up ~ 9 am and skips breakfast and will drinks Coffee with creamer and sugar (1-2 cups). Lunch:  skips  Dinner:  430 - 5 pm - yesterday - polish sausage in bun and fried potatoes. Evening snack ~9-10 pm had polish sausage and potatoes again  Middle of the night ~ 1-2 am - carrots and Ranch dip    She says she likes to measure and count her food. Pt states she has trouble sleeping and staying asleep, so has been getting up from bed in the middle of the night and eating, which she states she is not hungry in the morning.   -Main Beverages: pop occasionally, drinks water most of the time. Eat out - once a week with grandparents. Use to work in [place], but has [place] in [place] the past 1 year. Does not exercise but likes to take walks. Plans on riding her new bike to the [facility] most days of the week.      8/25/21 RD visit seeing for lowfat diet and weight loss and hx of cholecystectomy.  -Instructed the patient on: Simple Carbohydrate Counting, Consistent Carbohydrate Intake, Meal Planning for Regular, Balanced Meals & Snacks, Plate Method and The Importance of Regular Physical Activity     -Handouts given for: plate method - pictures with portions listed on handout of the carb containing foods, fancy plate pictures, healthy snack pictures, sample menu, healthy ijeoma pictures, you tube video exercises     Patient Instructions   Eat 3 meals/day:  Breakfast, Lunch and Supper. Ok to have a snack in the evening - 1 cup cereal OR other idea on your handout. Have 3 carb servings @ each meal + add protein + add non-starchy veggies. - follow the picture of the plate. Drink water most of the time. Sugar free drinks are ok too. Try the you tube chair exercises or other exercises that you can do and do this everyday. 9/15/21 RD f/u:  Pt states gained weight d/t at night eating too much. Still experiencing nausea - mornings and after eat. Cut down on the amount of cereal you eat at night. Use a small bowl instead of a large bowl. Great job eating veggies - cut back on ranch dip. Include fresh fruit too with meals or have as a snack. Drink 5 bottles of water/day and instead of Big Red have a sugar free pop. Do chair exercises everyday - work up to 30 minutes/day of doing these - you can break up this time if you want - Example:  10 min in the morning and 10 minutes in the afternoon and 10 minutes in the evening. Bring a 1 week food log to next dietitian appt. Today's visit:  -Blood sugar trends: not required to check BS. Pt states she has not checked her BS for a couple of months. Pt no longer lives in group home - pt states, \"Too much drama\"  Pt lives with great aunt - Lyn Islas x 1 year Edel Serrano has many food allergies). Currently a 2nd great aunt is staying with them until Sept. 10th. Pt here with her great aunt Lyn Islas. Lyn Islas does the cooking and pt trying to help. Pt c/o nauseated in the mornings and cannot eat. -Food recall:   Tries to get up ~ 8 am.  Coffee and takes medications @ 9 am. Eats 2 fresh mandarine oranges with this. Pt states she has 2 cups of coffee with 3TB powdered creamer/mug and sugar sub. Lays back down until Noon. Noon - 1/2 pbutter sandwich today with her medications. Lays down again. 4 pm - medications.   Eat 5-6 pm - Last night had [employer] - 2 chicken wings, mashed pot, gravy and slaw. Diet cranberry juice  Watches TV until 8-9 pm when take night meds. To bed - 9 - 10 pm  Nova made tacos one night - Pt ate 4 hard shell - ground beef, jhon, belkis, on, refried beans. Pt states sausage makes her sick. Martin Kaye has been out of town on vacation recently so pt relies on frozen meals such as \"Maria L callendar\" and \"hungry man\" dinners or she goes to grandmother's house to eat meals. Gets up several times during the night - Midnight - 3 am and 6 am to smoke and drink diet cranberry juice or she will snack and binge eat - fruity corby cereal.    -Main Beverages: Drinks 1 bottle Diet Cranberry juice/day. 2 cups coffee in the morning. Eat out/Take out - when great aunt out of town. Not exercising. Gets nauseated if moves around too quickly. Trying to get back to walking. Just got out of a boot.     -Impression of Dietary Intake: on average, 2 meals per day, high fat/ cholesterol, lacking routine intake of fruits and vegetables, excessive night time snacking    Current Outpatient Medications on File Prior to Visit   Medication Sig Dispense Refill    Cetirizine HCl (ALLERGY RELIEF) 10 MG CAPS Take 1 capsule by mouth daily      Probiotic Product (PROBIOTIC-10 PO) Take by mouth      metoprolol tartrate (LOPRESSOR) 50 MG tablet Take 1 tablet by mouth 2 times daily 60 tablet 11    Multiple Vitamins-Minerals (THERAPEUTIC MULTIVITAMIN-MINERALS) tablet Take 1 tablet by mouth daily      acetaminophen (TYLENOL) 500 MG tablet Take 1,000 mg by mouth every morning      cloZAPine (CLOZARIL) 25 MG tablet 125 mg BID (combines with 100mg tablet)      sucralfate (CARAFATE) 1 GM tablet Take 1 tablet by mouth 2 times daily 5-60\" before lunch and dinner 60 tablet 5    benztropine (COGENTIN) 0.5 MG tablet Take 0.5 mg by mouth 2 times daily      prazosin (MINIPRESS) 2 MG capsule Take 2 mg by mouth \"Takes for dreams\"      cloZAPine (CLOZARIL) 100 MG tablet Take 125 mg by mouth 2 times daily       ondansetron (ZOFRAN) 4 MG tablet Take 1 tablet by mouth every 12 hours as needed for Nausea or Vomiting 60 tablet 1    busPIRone (BUSPAR) 15 MG tablet 15 mg 4 times daily       pantoprazole (PROTONIX) 40 MG tablet Take 1 tablet by mouth every morning (before breakfast) 30 tablet 6    clonazePAM (KLONOPIN) 0.5 MG tablet Take 0.5 mg by mouth 2 times daily as needed for Anxiety. paliperidone palmitate ER (INVEGA SUSTENNA) 234 MG/1.5ML FABIANA IM injection Inject into the muscle every 30 days        No current facility-administered medications on file prior to visit. Vitals from current and previous visits:  7/9/19  Wt. 233#  8/25/2021  Wt. 261#  9/15/2021  Wt. 266#  Temp 97.3 °F (36.3 °C)   Ht 5' 3\" (1.6 m)   Wt (!) 307 lb (139.3 kg)   BMI 54.38 kg/m²     -Body mass index is 54.38 kg/m². Greater than 40 - Morbid Obesity / Extreme Obesity / Grade III. -Weight goal: lose weight. Nutrition Diagnosis:   Obesity and Altered GI function related to Disordered eating pattern as evidenced by Conditions associated with a diagnosis or treatment: Gastroparesis d/t diabetes and pt report of binge eating and irregular meal schedule and sedentary lifestyle. Intervention:  -Instructed the patient on: gastroparesis guidelines, importance of physical activity    -Handouts given for: gastroparesis guidelines and sample menus. Patient Instructions   Have a breakfast in the morning when you take morning meds. - instead of oranges have a 1 sl toasted bread, 1 egg, banana. Refer to your Gastroparesis handout for foods that are recommended. - low fat low fiber guidelines in general is what you want. Follow the plate method to plan balanced meals. - Try to have 3 food groups represented. Try not to go to bed after you  are up at 8-9 am.  - have a light breakfast  - take a short walk.   - have a light lunch  - take another walk in the afternoon and do your hobby. - take a healthy snack  - Have a regular supper  - do light exercises in the evening  - have a light snack in the evening no later than 7-8 pm  Go to bed 10 - 11 pm    Try to watch you tube exercises or get an exercise DVD to do exercises to. Bring a 1-2 week food log to next dietitian appt. -Nutrition prescription: 1600 calories/day, 180 g carbs/day. Adj wt. 162 # (73 kg)    Comprehension verified using teachback method. Monitoring/Evaluation:   -Followup visit: 4 weeks with dietitian.   -Receptiveness to education/goals: Agreeable.  -Evaluation of education: Indicates understanding.  -Readiness to change: precontemplative- eating a better breakfast, staying awake most of the day to develop a healthy eating pattern and less night-time snacking.  -Expected compliance: poor. Thank you for your referral of this patient. Total time involved in direct patient education: 60 minutes for initial MNT visit.

## 2022-08-22 NOTE — PATIENT INSTRUCTIONS
Have a breakfast in the morning when you take morning meds. - instead of oranges have a 1 sl toasted bread, 1 egg, banana. Refer to your Gastroparesis handout for foods that are recommended. - low fat low fiber guidelines in general is what you want. Follow the plate method to plan balanced meals. - Try to have 3 food groups represented. Try not to go to bed after you  are up at 8-9 am.  - have a light breakfast  - take a short walk. - have a light lunch  - take another walk in the afternoon and do your hobby. - take a healthy snack  - Have a regular supper  - do light exercises in the evening  - have a light snack in the evening no later than 7-8 pm  Go to bed 10 - 11 pm    Try to watch you tube exercises or get an exercise DVD to do exercises to. Bring a 1-2 week food log to next dietitian appt.

## 2022-09-09 ENCOUNTER — NURSE ONLY (OUTPATIENT)
Dept: LAB | Age: 27
End: 2022-09-09

## 2022-09-09 DIAGNOSIS — E11.65 TYPE 2 DIABETES MELLITUS WITH HYPERGLYCEMIA, WITHOUT LONG-TERM CURRENT USE OF INSULIN (HCC): ICD-10-CM

## 2022-09-09 LAB
ALBUMIN SERPL-MCNC: 4 G/DL (ref 3.5–5.1)
ALP BLD-CCNC: 113 U/L (ref 38–126)
ALT SERPL-CCNC: 29 U/L (ref 11–66)
ANION GAP SERPL CALCULATED.3IONS-SCNC: 10 MEQ/L (ref 8–16)
AST SERPL-CCNC: 20 U/L (ref 5–40)
BILIRUB SERPL-MCNC: < 0.2 MG/DL (ref 0.3–1.2)
BUN BLDV-MCNC: 9 MG/DL (ref 7–22)
CALCIUM SERPL-MCNC: 9.1 MG/DL (ref 8.5–10.5)
CHLORIDE BLD-SCNC: 106 MEQ/L (ref 98–111)
CHOLESTEROL, TOTAL: 177 MG/DL (ref 100–199)
CO2: 23 MEQ/L (ref 23–33)
CREAT SERPL-MCNC: 0.7 MG/DL (ref 0.4–1.2)
GFR SERPL CREATININE-BSD FRML MDRD: > 90 ML/MIN/1.73M2
GLUCOSE BLD-MCNC: 141 MG/DL (ref 70–108)
HDLC SERPL-MCNC: 39 MG/DL
LDL CHOLESTEROL CALCULATED: 104 MG/DL
POTASSIUM SERPL-SCNC: 4.5 MEQ/L (ref 3.5–5.2)
SODIUM BLD-SCNC: 139 MEQ/L (ref 135–145)
TOTAL PROTEIN: 7 G/DL (ref 6.1–8)
TRIGL SERPL-MCNC: 168 MG/DL (ref 0–199)

## 2022-10-19 ENCOUNTER — HOSPITAL ENCOUNTER (OUTPATIENT)
Dept: PHYSICAL THERAPY | Age: 27
Setting detail: THERAPIES SERIES
Discharge: HOME | End: 2022-10-19
Payer: MEDICAID

## 2022-10-19 PROCEDURE — 97530 THERAPEUTIC ACTIVITIES: CPT

## 2022-10-19 PROCEDURE — 97162 PT EVAL MOD COMPLEX 30 MIN: CPT
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
## 2022-11-01 ENCOUNTER — OFFICE VISIT (OUTPATIENT)
Dept: INTERNAL MEDICINE CLINIC | Age: 27
End: 2022-11-01
Payer: MEDICAID

## 2022-11-01 VITALS — WEIGHT: 293 LBS | BODY MASS INDEX: 50.02 KG/M2 | HEIGHT: 64 IN

## 2022-11-01 DIAGNOSIS — K31.84 GASTROPARESIS DUE TO DM (HCC): Primary | ICD-10-CM

## 2022-11-01 DIAGNOSIS — E11.43 GASTROPARESIS DUE TO DM (HCC): Primary | ICD-10-CM

## 2022-11-01 PROCEDURE — 97803 MED NUTRITION INDIV SUBSEQ: CPT | Performed by: DIETITIAN, REGISTERED

## 2022-11-01 RX ORDER — DOXEPIN HYDROCHLORIDE 50 MG/1
50 CAPSULE ORAL NIGHTLY
COMMUNITY

## 2022-11-01 NOTE — PROGRESS NOTES
[address] [place] [address]. [address] [place] [address] [place]., [facility], [address]  [phone] (phone)  [phone] (fax)    Patient Name: Vannessa Contreras. Date of Birth: [id number]. MRN: [id number]      Assessment: Patient is a 32 y.o. female seen for follow-up MNT visit for Type 2 DB with gastroparesis. -Nutritionally relevant labs:   Lab Results   Component Value Date/Time    LABA1C 7.1 (H) 07/27/2022 01:18 PM    GLUCOSE 141 (H) 09/09/2022 08:45 AM    GLUCOSE 100 07/27/2022 01:18 PM    CHOL 177 09/09/2022 08:45 AM    HDL 39 09/09/2022 08:45 AM    LDLCALC 104 09/09/2022 08:45 AM    TRIG 168 09/09/2022 08:45 AM     Pt here with her great aunt [facility] and lives with her. No DB medications.  -Blood sugar trends: Forgot her meter.  - 210.    -Food recall:   Breakfast: 1 slice toast with light butter. Drinks coffee with sugar sub with her cigarettes in the morning. Lunch: 1 sl bread with zero sugar pbutter OR Grits  Dinner: pork chop with veggies (broccoli or california blend or b.sprouts or spinach or asparagus - buys frozen vegetables from [employer] and adds salt) OR grilled cheese OR ribs and veggies OR chopped cube steak    Cannot do lettuce - causing nausea. Does not tolerate egg whites but does ok with egg salad. Snacks: pork rinds OR cheese and saltine crackers. Has cut back on what she eats. Gets nauseated so drink diet pop often. She states she is having an EGD scheduled for Monday and will also have her throat stretched.   Some days her nausea is so bad that it is hard to get out of bed.    -Main Beverages: zero sugar pop, unsw tea and diet cranberry juice, milk with cereal at night     -Impression of Dietary Intake: on average, 3 meals per day    Current Outpatient Medications on File Prior to Visit   Medication Sig Dispense Refill    doxepin (SINEQUAN) 50 MG capsule Take 50 mg by mouth nightly      Cetirizine HCl (ALLERGY RELIEF) 10 MG CAPS Take 1 capsule by mouth daily      Probiotic Product (PROBIOTIC-10 PO) Take by mouth      metoprolol tartrate (LOPRESSOR) 50 MG tablet Take 1 tablet by mouth 2 times daily 60 tablet 11    Multiple Vitamins-Minerals (THERAPEUTIC MULTIVITAMIN-MINERALS) tablet Take 1 tablet by mouth daily      acetaminophen (TYLENOL) 500 MG tablet Take 1,000 mg by mouth every morning      cloZAPine (CLOZARIL) 25 MG tablet 125 mg BID (combines with 100mg tablet)      sucralfate (CARAFATE) 1 GM tablet Take 1 tablet by mouth 2 times daily 5-60\" before lunch and dinner 60 tablet 5    benztropine (COGENTIN) 0.5 MG tablet Take 0.5 mg by mouth 2 times daily      prazosin (MINIPRESS) 2 MG capsule Take 2 mg by mouth \"Takes for dreams\"      cloZAPine (CLOZARIL) 100 MG tablet Take 125 mg by mouth 2 times daily       ondansetron (ZOFRAN) 4 MG tablet Take 1 tablet by mouth every 12 hours as needed for Nausea or Vomiting 60 tablet 1    busPIRone (BUSPAR) 15 MG tablet 15 mg 4 times daily       pantoprazole (PROTONIX) 40 MG tablet Take 1 tablet by mouth every morning (before breakfast) 30 tablet 6    clonazePAM (KLONOPIN) 0.5 MG tablet Take 0.5 mg by mouth 2 times daily as needed for Anxiety. meloxicam (MOBIC) 15 MG tablet  (Patient not taking: Reported on 11/1/2022)      paliperidone palmitate ER (INVEGA SUSTENNA) 234 MG/1.5ML FABIANA IM injection Inject into the muscle every 30 days  (Patient not taking: Reported on 11/1/2022)       No current facility-administered medications on file prior to visit. Vitals from current and previous visits:  Ht 5' 4\" (1.626 m)   Wt (!) 304 lb 3.2 oz (138 kg)   BMI 52.22 kg/m²     -Body mass index is 52.22 kg/m². Greater than 40 - Morbid Obesity / Extreme Obesity / Grade III. - Patient lost and 3 pounds over the last 11 weeks.  -Weight goal: lose weight. Nutrition Diagnosis:   Food and nutrition-related knowledge deficit related to currently undergoing MNT as evidenced by Dx - gastroparesis d/t diabetes. Intervention:  -Impression: Pt c/o motion sickness and nausea which prevents her from doing certain activities. She states she cannot sit for long periods of time.    -Instructed the patient on: Gastroparesis Carbohydrate Counting meal plan - choose low fiber and low fat. Consistent Carbohydrate Intake, Meal Planning for Regular, Balanced Meals & Snacks, The Importance of Regular Physical Activity, and When having cereal at night for a snack - have a small fruit bowl size instead of a regular bowl. Ok to have a protein with this at night - 1 oz cheese or 1 TB pbutter. Avoiding fibrous meats - need to have tender moist protein and cut up.    -Handouts given for: Gastroparesis carbohydrate counting booklet, food logging, and DB gastroparesis sample menus x2, Gastroparesis DB snacks. Patient Instructions   Refer to your meal plan on the inside cover of your Carb count guide booklet that is modified for your gastroparesis. - see sample menus for meal and snack ideas. Take short walks throughout the day. Bring your meter and a 1 week food log to next dietitian appt. -Nutrition prescription: 1400 - 1500 calories/day, 130 - 165 g carbs/day. Comprehension verified using teachback method. Monitoring/Evaluation:   -Followup visit: 8 weeks with dietitian.   -Receptiveness to education/goals: Agreeable.  -Evaluation of education: Indicates understanding.  -Readiness to change: precontemplative- referring to sample menus for snack and meal ideas.  -Expected compliance: good. Thank you for your referral of this patient. Total time involved in direct patient education: 60 minutes for follow-up MNT visit.

## 2022-11-01 NOTE — PATIENT INSTRUCTIONS
Refer to your meal plan on the inside cover of your Carb count guide booklet that is modified for your gastroparesis. - see sample menus for meal and snack ideas. Take short walks throughout the day. Bring your meter and a 1 week food log to next dietitian appt.

## 2022-11-06 NOTE — H&P
[facility] Endoscopy    Pre-Endoscopy H&PE      Patient: Андрей Pope    : 1995    Acct#: [de-identified]  Primary Care Physician: NAMAN Vaughn CNP     Planned Procedure:    [x]EGD    []Enteroscopy    []PEG    []PEG change    []PEG removal  []Variceal banding    []Biopsy   [x]Dilation      []Control of bleeding  []Destruction of lesion by [facility]    []Stent Placement  []Foreign Body Removal    []Snare Polypectomy  []Other:       []Colonoscopy  []Flex Sigmoid []EUS, rectal      []Biopsy   []Dilation      []Control of bleeding  []Destruction of lesion by [facility]    []Stent Placement  []Foreign Body Removal    []Snare Polypectomy  []Other:      Planned Sedation  []Conscious Sedation [x]MAC/Propofol []Anesthesia    []None    Airway:  Adequate for planned sedation    Indication:    dysphagia, GERD, nausea, upper abdominal pain     History:  The patient is a 27 y.o. female with schizophrenia seen by Ulises Solitario CNP 22 with dysphagia, GERD, nausea, bloating, upper abdominal pain, constipation, and chest pain. She denies NSAID use. She's taken pantoprazole for 6 months without improvement. I performed an EGD with dilation to 54F on 22 that demonstrated mild gastritis with biopsies negative for Hpylori. She had some retained gastric fluid, and I placed her on bid sucralfate. GES 22 demonstrated gastroparesis. When she saw Ulises Solitario CNP 22, she was continuing to have dysphagia, GERD, nausea, upper abdominal pain. Physical Exam:  VITALS: BP (!) 143/98   Pulse (!) 120   Temp 96.9 °F (36.1 °C) (Temporal)   Resp 16   Ht 5' 3\" (1.6 m)   Wt (!) 304 lb 12.8 oz (138.3 kg)   SpO2 95%   BMI 53.99 kg/m²   The patient is a 32 y.o. female in no acute distress. HEAD: Normal cephalic/atraumatic. Extra-occular motions intact bilaterally. NECK: No lymphadenopathy or bruits. CHEST: Rises equally on inspiration. Clear to auscultation bilaterally.    CARDIOVASCULAR: Regular rate and rhythm without murmurs, rubs or gallops. ABDOMEN: Soft, nontender, and nondistended with normal bowel sounds. No Hepatosplemomegaly. UPPER EXTREMITIES: no cyanosis, clubbing, or edema. DERM: no rash or jaundice. LOWER EXTREMITIES: no cyanosis, clubbing, or edema. NEURO: Alert and oriented times four. Patient moves all extremities and has gross sensation in all extremities. ASA: ASA 3 - Patient with moderate systemic disease with functional limitations    Past Medical History:        Diagnosis Date    Arthritis     Bipolar 1 disorder (HCC)     Fibromyalgia     PONV (postoperative nausea and vomiting)     Pre-diabetes     Psychosis ([facility] [address]     Schizophrenia (HCC)     Stomach pain      Past Surgical History:        Procedure Laterality Date    CHOLECYSTECTOMY, LAPAROSCOPIC N/A 2021    ROBOTIC CHOLECYSTECTOMY performed by Wilmer Roman MD at [address] [facility]  2021    Dr. Lenward Gosselin    EGD  2021    [place]    FOOT TENDON SURGERY Left 2021    LEFT FOOT EXCISION OF ACCESSORY NAVICULAR WITH RECONSTRUCTION OF POSTERIOR TIBIAL TENDON performed by Bri Smith DPM at [facility] Right 2022    Right Foot Excision of Accessory Navicular with Reconstruction of Posterior Tibial Tendon performed by Bri Smith DPM at [facility] N/A 2022    EGD DILATION SAVORY performed by Reymundo Apgar, MD at [place] [address] Left 2022    EGD BIOPSY performed by Reymundo Apgar, MD at [address] [place] [address]       Allergies:  Patient has no known allergies. Home Meds:  Prior to Admission medications    Medication Sig Start Date End Date Taking?  Authorizing Provider   doxepin (SINEQUAN) 50 MG capsule Take 50 mg by mouth nightly    Historical Provider, MD   meloxicam (MOBIC) 15 MG tablet  22   Historical Provider, MD   Cetirizine HCl (ALLERGY RELIEF) 10 MG CAPS Take 1 capsule by mouth daily    Historical Provider, MD   Probiotic Product (PROBIOTIC-10 PO) Take by mouth    Historical Provider, MD   metoprolol tartrate (LOPRESSOR) 50 MG tablet Take 1 tablet by mouth 2 times daily 5/17/22   Guerrero Mejia MD   Multiple Vitamins-Minerals (THERAPEUTIC MULTIVITAMIN-MINERALS) tablet Take 1 tablet by mouth daily    Historical Provider, MD   acetaminophen (TYLENOL) 500 MG tablet Take 1,000 mg by mouth every morning    Historical Provider, MD   cloZAPine (CLOZARIL) 25 MG tablet 125 mg BID (combines with 100mg tablet) 12/23/21   Historical Provider, MD   sucralfate (CARAFATE) 1 GM tablet Take 1 tablet by mouth 2 times daily 5-60\" before lunch and dinner 2/7/22   Jason Hansen MD   benztropine (COGENTIN) 0.5 MG tablet Take 0.5 mg by mouth 2 times daily    Historical Provider, MD   prazosin (MINIPRESS) 2 MG capsule Take 2 mg by mouth \"Takes for dreams\" 11/8/21   Historical Provider, MD   cloZAPine (CLOZARIL) 100 MG tablet Take 125 mg by mouth 2 times daily  10/27/21   Historical Provider, MD   ondansetron (ZOFRAN) 4 MG tablet Take 1 tablet by mouth every 12 hours as needed for Nausea or Vomiting 10/13/21   NAMAN Meade CNP   busPIRone (BUSPAR) 15 MG tablet 15 mg 4 times daily  6/12/21   Historical Provider, MD   pantoprazole (PROTONIX) 40 MG tablet Take 1 tablet by mouth every morning (before breakfast) 1/20/21   NAMAN Meade CNP   clonazePAM (KLONOPIN) 0.5 MG tablet Take 0.5 mg by mouth 2 times daily as needed for Anxiety.      Historical Provider, MD   paliperidone palmitate ER (INVEGA SUSTENNA) 234 MG/1.5ML FABIANA IM injection Inject into the muscle every 30 days   Patient not taking: Reported on 11/1/2022    Historical Provider, MD     Social History:   Social History     Socioeconomic History    Marital status: Single     Spouse name: Not on file    Number of children: Not on file    Years of education: Not on file    Highest education level: Not on file   Occupational History Not on file   Tobacco Use    Smoking status: Every Day     Packs/day: 0.50     Types: Cigarettes    Smokeless tobacco: Never   Vaping Use    Vaping Use: Never used   Substance and Sexual Activity    Alcohol use: Not Currently    Drug use: No    Sexual activity: Yes     Partners: Male   Other Topics Concern    Not on file   Social History Narrative    Not on file     Social Determinants of Health     Financial Resource Strain: Not on file   Food Insecurity: Not on file   Transportation Needs: Not on file   Physical Activity: Not on file   Stress: Not on file   Social Connections: Not on file   Intimate Partner Violence: Not on file   Housing Stability: Not on file     Family History:   No GI malignancies     ROS:  GENERAL: No fever or chills. NEURO: No headache or seizure. EYES: No diplopia or vision loss. CARDIOVASCULAR: No chest pain or palpitations. RESPIRATORY: No dyspnea or cough. GI: NO melena. NO hematochezia   : No dysuria or hematuria. GYN: No discharge or abnormal bleeding. MUSCULOSKELETAL: No new arthralgias or myalgias  DERM: No rash or jaundice. ENDOCRINE: No polyuria or polydipsia. PSYCH: No anxiety or depression. Informed Consent:  The risks and benefits of the procedure have been discussed with either the patient or if they cannot consent, their representative. Assessment:  Patient examined and appropriate for planned sedation and procedure. Plan:  Proceed with planned sedation and procedure.     Hitesh David MD  8:15 AM 11/7/2022

## 2022-11-07 ENCOUNTER — ANESTHESIA (OUTPATIENT)
Dept: ENDOSCOPY | Age: 27
End: 2022-11-07
Payer: MEDICAID

## 2022-11-07 ENCOUNTER — ANESTHESIA EVENT (OUTPATIENT)
Dept: ENDOSCOPY | Age: 27
End: 2022-11-07
Payer: MEDICAID

## 2022-11-07 ENCOUNTER — HOSPITAL ENCOUNTER (OUTPATIENT)
Age: 27
Setting detail: OUTPATIENT SURGERY
Discharge: HOME | End: 2022-11-07
Attending: INTERNAL MEDICINE | Admitting: INTERNAL MEDICINE
Payer: MEDICAID

## 2022-11-07 VITALS
BODY MASS INDEX: 51.91 KG/M2 | DIASTOLIC BLOOD PRESSURE: 72 MMHG | RESPIRATION RATE: 16 BRPM | SYSTOLIC BLOOD PRESSURE: 160 MMHG | HEIGHT: 63 IN | HEART RATE: 115 BPM | TEMPERATURE: 96.7 F | WEIGHT: 293 LBS | OXYGEN SATURATION: 93 %

## 2022-11-07 LAB — PREGNANCY, URINE: NEGATIVE

## 2022-11-07 PROCEDURE — 7100000011 HC PHASE II RECOVERY - ADDTL 15 MIN: Performed by: INTERNAL MEDICINE

## 2022-11-07 PROCEDURE — 81025 URINE PREGNANCY TEST: CPT

## 2022-11-07 PROCEDURE — 2580000003 HC RX 258: Performed by: NURSE ANESTHETIST, CERTIFIED REGISTERED

## 2022-11-07 PROCEDURE — 3609012700 HC EGD DILATION SAVORY: Performed by: INTERNAL MEDICINE

## 2022-11-07 PROCEDURE — 2709999900 HC NON-CHARGEABLE SUPPLY: Performed by: INTERNAL MEDICINE

## 2022-11-07 PROCEDURE — 3700000000 HC ANESTHESIA ATTENDED CARE: Performed by: INTERNAL MEDICINE

## 2022-11-07 PROCEDURE — 7100000010 HC PHASE II RECOVERY - FIRST 15 MIN: Performed by: INTERNAL MEDICINE

## 2022-11-07 PROCEDURE — 6360000002 HC RX W HCPCS: Performed by: NURSE ANESTHETIST, CERTIFIED REGISTERED

## 2022-11-07 PROCEDURE — 2500000003 HC RX 250 WO HCPCS: Performed by: NURSE ANESTHETIST, CERTIFIED REGISTERED

## 2022-11-07 PROCEDURE — 3700000001 HC ADD 15 MINUTES (ANESTHESIA): Performed by: INTERNAL MEDICINE

## 2022-11-07 RX ORDER — LIDOCAINE HYDROCHLORIDE 20 MG/ML
INJECTION, SOLUTION INFILTRATION; PERINEURAL PRN
Status: DISCONTINUED | OUTPATIENT
Start: 2022-11-07 | End: 2022-11-07 | Stop reason: SDUPTHER

## 2022-11-07 RX ORDER — SODIUM CHLORIDE 0.9 % (FLUSH) 0.9 %
5-40 SYRINGE (ML) INJECTION PRN
Status: DISCONTINUED | OUTPATIENT
Start: 2022-11-07 | End: 2022-11-07 | Stop reason: HOSPADM

## 2022-11-07 RX ORDER — SODIUM CHLORIDE 450 MG/100ML
INJECTION, SOLUTION INTRAVENOUS CONTINUOUS
Status: DISCONTINUED | OUTPATIENT
Start: 2022-11-07 | End: 2022-11-07 | Stop reason: HOSPADM

## 2022-11-07 RX ORDER — PROPOFOL 10 MG/ML
INJECTION, EMULSION INTRAVENOUS PRN
Status: DISCONTINUED | OUTPATIENT
Start: 2022-11-07 | End: 2022-11-07 | Stop reason: SDUPTHER

## 2022-11-07 RX ORDER — SODIUM CHLORIDE 9 MG/ML
INJECTION, SOLUTION INTRAVENOUS CONTINUOUS PRN
Status: DISCONTINUED | OUTPATIENT
Start: 2022-11-07 | End: 2022-11-07 | Stop reason: SDUPTHER

## 2022-11-07 RX ORDER — SODIUM CHLORIDE 0.9 % (FLUSH) 0.9 %
5-40 SYRINGE (ML) INJECTION EVERY 12 HOURS SCHEDULED
Status: DISCONTINUED | OUTPATIENT
Start: 2022-11-07 | End: 2022-11-07 | Stop reason: HOSPADM

## 2022-11-07 RX ORDER — KETAMINE HYDROCHLORIDE 50 MG/ML
INJECTION, SOLUTION, CONCENTRATE INTRAMUSCULAR; INTRAVENOUS PRN
Status: DISCONTINUED | OUTPATIENT
Start: 2022-11-07 | End: 2022-11-07 | Stop reason: SDUPTHER

## 2022-11-07 RX ORDER — SODIUM CHLORIDE 9 MG/ML
25 INJECTION, SOLUTION INTRAVENOUS PRN
Status: DISCONTINUED | OUTPATIENT
Start: 2022-11-07 | End: 2022-11-07 | Stop reason: HOSPADM

## 2022-11-07 RX ADMIN — PROPOFOL 100 MG: 10 INJECTION, EMULSION INTRAVENOUS at 08:24

## 2022-11-07 RX ADMIN — LIDOCAINE HYDROCHLORIDE 140 MG: 20 INJECTION, SOLUTION INFILTRATION; PERINEURAL at 08:24

## 2022-11-07 RX ADMIN — SODIUM CHLORIDE: 9 INJECTION, SOLUTION INTRAVENOUS at 08:21

## 2022-11-07 RX ADMIN — KETAMINE HYDROCHLORIDE 50 MG: 50 INJECTION, SOLUTION INTRAMUSCULAR; INTRAVENOUS at 08:24

## 2022-11-07 ASSESSMENT — ENCOUNTER SYMPTOMS
SHORTNESS OF BREATH: 1
DYSPNEA ACTIVITY LEVEL: NO INTERVAL CHANGE

## 2022-11-07 ASSESSMENT — LIFESTYLE VARIABLES: SMOKING_STATUS: 1

## 2022-11-07 NOTE — PROGRESS NOTES
Pt in phase 2 of recovery   Fully awake denies pain  Dr. Lalitha Maria in to speak with  patient and family   IV removed  Patient tolerating fluids

## 2022-11-07 NOTE — H&P
[address] [place] [address] Endoscopy    Patient: Oskar Nielsen  : 1995  Acct#: [de-identified]  Date of evaluation: 2022  Primary Care Physician: NAMAN Shin CNP     Procedure:    [x]EGD    []Enteroscopy    []Biopsy   [x]Dilation      []PEG    []PEG change    []PEG removal  []Variceal banding    []Control of bleeding  []Destruction of lesion by [facility]    []Stent Placement  []Foreign Body Removal    []Snare Polypectomy  []Other:     []Aborted:        []Colonoscopy  []Flex Sigmoid []EUS, rectal     []Biopsy   []Snare Polypectomy    []Control of bleeding  []Destruction of lesion by [facility]    []Stent Placement  []Foreign Body Removal    []Dilation    []Other:    []Aborted:   []Tattoo    Indication:    dysphagia, GERD, nausea, upper abdominal pain     History:  The patient is a 27 y.o. female with schizophrenia seen by Juan Morris CNP 22 with dysphagia, GERD, nausea, bloating, upper abdominal pain, constipation, and chest pain. She denies NSAID use. She's taken pantoprazole for 6 months without improvement. I performed an EGD with dilation to 54F on 22 that demonstrated mild gastritis with biopsies negative for Hpylori. She had some retained gastric fluid, and I placed her on bid sucralfate. GES 22 demonstrated gastroparesis. When she saw Juan Morris CNP 22, she was continuing to have dysphagia, GERD, nausea, upper abdominal pain. Physical Exam:  VITALS: BP (!) 143/98   Pulse (!) 120   Temp 96.9 °F (36.1 °C) (Temporal)   Resp 16   Ht 5' 3\" (1.6 m)   Wt (!) 304 lb 12.8 oz (138.3 kg)   SpO2 95%   BMI 53.99 kg/m²   The patient is a 32 y.o. female in no acute distress. HEAD: Normal cephalic/atraumatic. Extra-occular motions intact bilaterally. NECK: No lymphadenopathy or bruits. CHEST: Rises equally on inspiration. Clear to auscultation bilaterally.    CARDIOVASCULAR: Regular rate and rhythm without murmurs, rubs or gallops. ABDOMEN: Soft, nontender, and nondistended with normal bowel sounds. No Hepatosplemomegaly. UPPER EXTREMITIES: no cyanosis, clubbing, or edema. DERM: no rash or jaundice. LOWER EXTREMITIES: no cyanosis, clubbing, or edema. NEURO: Alert and oriented times four. Patient moves all extremities and has gross sensation in all extremities. ASA: ASA 3 - Patient with moderate systemic disease with functional limitations    MEDICATION:    MAC/Propofol/Anesthesia:  Yes     Photo:  Yes  Biopsy:  No      Description of Procedure: The risks and benefits of the procedure were described to the patient or their representative if unable to give consent including but not limited to bleeding, infection, poking a hole someplace requiring surgery to fix it, having a reaction to medication, and death. The patient or their representative if unable to give consent understood these risks and provided informed consent. Airway was assessed and is adequate for the planned sedation. Anesthesia: MAC  Estimated Blood Loss: less than 50   Complications: None  Specimens: Was Not Obtained     Sedation was administered by anesthesia who monitored the patient during the procedure. The patient was placed in the left lateral decubitus position. A forward-viewing Olympus endoscope was lubricated and inserted through the mouth into the oropharynx. Under direct visualization, the upper esophagus was intubated. The scope was advanced through the esophagus and stomach to second portion of duodenum. Scope was slowly withdrawn with good views of mucosal surfaces. The scope was retroflexed in the fundus. Findings and maneuvers are listed in impression below. The patient tolerated the procedure well. The scope was removed. There were no immediate complications. IMPRESSION:  1. Esophagus - normal   2. Stomach - mild gastritis in cardia likely from wretching  3. Duodenum - normal   4.   Empiric dilation performed of the esophagus with a [employer] dilator over a guidewire. There is no resistance at the UES on passing the dilator. Upon removal, the tips of dilator and guidewire are intact. The endoscope was reinserted. There are no mucosal tears. 5.  Gastroparesis  6. Polypharmacy    RECOMMENDATIONS:    1. Await pathology   2. Post-esophageal dilation recommendations including soft diet and cepacol spray or lozenges as needed for sore throat. 3.  Discuss with psychiatry which medications may be causing nausea  4. Follow up with Ulises Solitario CNP IN 6-8 weeks at  [address] [place] [address].  [address], [address] [facility]   Office will call patient to schedule    Shar Wilcox MD  8:37 AM 11/7/2022

## 2022-11-07 NOTE — ANESTHESIA PRE PROCEDURE
Department of Anesthesiology  Preprocedure Note       Name:  Blanquita Cano   Age:  32 y.o.  :  1995                                          MRN:  [id number]         Date:  2022      Surgeon: Essie Abarca):  Lovely Anderson MD    Procedure: Procedure(s):  EGD DILATION    Medications prior to admission:   Prior to Admission medications    Medication Sig Start Date End Date Taking?  Authorizing Provider   doxepin (SINEQUAN) 50 MG capsule Take 50 mg by mouth nightly    Historical Provider, MD   meloxicam (MOBIC) 15 MG tablet  22   Historical Provider, MD   Cetirizine HCl (ALLERGY RELIEF) 10 MG CAPS Take 1 capsule by mouth daily    Historical Provider, MD   Probiotic Product (PROBIOTIC-10 PO) Take by mouth    Historical Provider, MD   metoprolol tartrate (LOPRESSOR) 50 MG tablet Take 1 tablet by mouth 2 times daily 22   Allan Elias MD   Multiple Vitamins-Minerals (THERAPEUTIC MULTIVITAMIN-MINERALS) tablet Take 1 tablet by mouth daily    Historical Provider, MD   acetaminophen (TYLENOL) 500 MG tablet Take 1,000 mg by mouth every morning    Historical Provider, MD   cloZAPine (CLOZARIL) 25 MG tablet 125 mg BID (combines with 100mg tablet) 21   Historical Provider, MD   sucralfate (CARAFATE) 1 GM tablet Take 1 tablet by mouth 2 times daily 5-60\" before lunch and dinner 22   Lovely Anderson MD   benztropine (COGENTIN) 0.5 MG tablet Take 0.5 mg by mouth 2 times daily    Historical Provider, MD   prazosin (MINIPRESS) 2 MG capsule Take 2 mg by mouth \"Takes for dreams\" 21   Historical Provider, MD   cloZAPine (CLOZARIL) 100 MG tablet Take 125 mg by mouth 2 times daily  10/27/21   Historical Provider, MD   ondansetron (ZOFRAN) 4 MG tablet Take 1 tablet by mouth every 12 hours as needed for Nausea or Vomiting 10/13/21   Leita Pain, APRN - CNP   busPIRone (BUSPAR) 15 MG tablet 15 mg 4 times daily  21   Historical Provider, MD   pantoprazole (PROTONIX) 40 MG tablet Take 1 tablet by mouth every morning (before breakfast) 1/20/21   NAMAN De La Paz CNP   clonazePAM (KLONOPIN) 0.5 MG tablet Take 0.5 mg by mouth 2 times daily as needed for Anxiety. Historical Provider, MD   paliperidone palmitate ER (INVEGA SUSTENNA) 234 MG/1.5ML FABIANA IM injection Inject into the muscle every 30 days   Patient not taking: Reported on 11/1/2022    Historical Provider, MD       Current medications:    No current facility-administered medications for this encounter.        Allergies:  No Known Allergies    Problem List:    Patient Active Problem List   Diagnosis Code    Schizophrenia ([facility] [address] F20.9    Tachycardia R00.0    Chest pain R07.9    Sepsis ([facility] [address] A41.9    Leukocytosis D72.829    Normocytic anemia D64.9    Bilateral atelectasis U10.59    Metabolic acidosis S83.71    hX OF Atypical chest pain- CHRONIC, CORONARY CTA WNL R07.89    Morbid obesity (HCC) E66.01    Bipolar 1 disorder (HCC) F31.9    Tobacco abuse Z72.0    BMI 40.0-44.9, adult (HCC) P31.64    Biliary colic L30.42    Intermittent palpitations R00.2    SOB (shortness of breath) on exertion R06.02    Sinus tachycardia R00.0    Inappropriate sinus tachycardia R00.0    Dizziness on standing R42    Pre-diabetes R73.03    Stomach pain R10.9    Gastroparesis due to DM (HCC) E11.43, K31.84       Past Medical History:        Diagnosis Date    Arthritis     Bipolar 1 disorder (HCC)     Fibromyalgia     PONV (postoperative nausea and vomiting)     Pre-diabetes     Psychosis (HCC)     Schizophrenia (HCC)     Stomach pain        Past Surgical History:        Procedure Laterality Date    CHOLECYSTECTOMY, LAPAROSCOPIC N/A 5/5/2021    ROBOTIC CHOLECYSTECTOMY performed by Ray Deal MD at [address],[facility] [address]  02/2021    Dr. Phyllis Lux EGD  02/2021    [place]    FOOT TENDON SURGERY Left 9/20/2021    LEFT FOOT EXCISION OF ACCESSORY NAVICULAR WITH RECONSTRUCTION OF POSTERIOR TIBIAL TENDON performed by Salvador Sanders DPM at [facility]    OSTEOTOMY Right 6/21/2022    Right Foot Excision of Accessory Navicular with Reconstruction of Posterior Tibial Tendon performed by Jocelyne Chandler DPM at [facility] N/A 2/7/2022    EGD DILATION SAVORY performed by Lucero Moran MD at [facility]    UPPER GASTROINTESTINAL ENDOSCOPY Left 2/7/2022    EGD BIOPSY [facility] Lucero [facility]         Social History:    Social History     Tobacco Use    Smoking status: Every Day     Packs/day: 0.50     Types: Cigarettes    Smokeless tobacco: Never   Substance Use Topics    Alcohol use: Not Currently                                Ready to quit: Not Answered  Counseling given: Not Answered      Vital Signs (Current): There were no vitals filed for this visit.                                            BP Readings from Last 3 Encounters:   09/07/22 (!) 142/90   08/16/22 134/82   07/27/22 112/78       NPO Status:                                                                                 BMI:   Wt Readings from Last 3 Encounters:   11/01/22 (!) 304 lb 3.2 oz (138 kg)   09/07/22 (!) 309 lb (140.2 kg)   08/22/22 (!) 307 lb (139.3 kg)     There is no height or weight on file to calculate BMI.    CBC:   Lab Results   Component Value Date/Time    WBC 15.0 06/28/2022 12:00 AM    RBC 4.64 06/28/2022 12:00 AM    HGB 13.3 06/28/2022 12:00 AM    HCT 43.5 06/28/2022 12:00 AM    MCV 92.6 05/19/2022 04:51 PM     06/28/2022 12:00 AM       CMP:   Lab Results   Component Value Date/Time     09/09/2022 08:45 AM    K 4.5 09/09/2022 08:45 AM    K 4.0 05/27/2021 11:23 AM     09/09/2022 08:45 AM    CO2 23 09/09/2022 08:45 AM    BUN 9 09/09/2022 08:45 AM    CREATININE 0.7 09/09/2022 08:45 AM    LABGLOM >90 09/09/2022 08:45 AM    GLUCOSE 141 09/09/2022 08:45 AM    PROT 7.0 09/09/2022 08:45 AM    CALCIUM 9.1 09/09/2022 08:45 AM    BILITOT <0.2 09/09/2022 08:45 AM    ALKPHOS 113 09/09/2022 08:45 AM    AST 20 09/09/2022 08:45 AM    ALT 29 09/09/2022 08:45 AM       POC Tests: No results for input(s): POCGLU, POCNA, POCK, POCCL, POCBUN, POCHEMO, POCHCT in the last 72 hours. Coags: No results found for: PROTIME, INR, APTT    HCG (If Applicable):   Lab Results   Component Value Date    PREGTESTUR negative 06/21/2022    PREGSERUM NEGATIVE 07/16/2021        ABGs: No results found for: PHART, PO2ART, PZG1AIJ, TXP7WMG, BEART, B6AULZXS     Type & Screen (If Applicable):  No results found for: LABABO, LABRH    Drug/Infectious Status (If Applicable):  No results found for: HIV, HEPCAB    COVID-19 Screening (If Applicable):   Lab Results   Component Value Date/Time    COVID19 Not Detected 04/29/2021 12:16 PM           Anesthesia Evaluation  Patient summary reviewed  Airway: Mallampati: II  TM distance: >3 FB   Neck ROM: full  Mouth opening: > = 3 FB   Dental:          Pulmonary:   (+) shortness of breath: no interval change,  current smoker          Patient smoked on day of surgery. Cardiovascular:  Exercise tolerance: good (>4 METS),   (+) LEI: no interval change,                   Neuro/Psych:   (+) neuromuscular disease:, psychiatric history: stable with treatment             ROS comment: Fibromyalgia GI/Hepatic/Renal:            ROS comment: Dysphagia. Endo/Other:    (+) DiabetesType II DM, , .          Pt had no PAT visit       Abdominal:             Vascular: Other Findings:           Anesthesia Plan      MAC     ASA 3             Anesthetic plan and risks discussed with patient. Plan discussed with CRNA.                     NAMAN Stokes - ZACHERY   11/7/2022

## 2022-11-07 NOTE — OP NOTE
[address] [place] [address] Endoscopy    Patient: Herminia Dyer  : 1995  Acct#: [de-identified]  Date of evaluation: 2022  Primary Care Physician: NAMAN Cervantes CNP     Procedure:    [x]EGD    []Enteroscopy    []Biopsy   [x]Dilation      []PEG    []PEG change    []PEG removal  []Variceal banding    []Control of bleeding  []Destruction of lesion by [facility]    []Stent Placement  []Foreign Body Removal    []Snare Polypectomy  []Other:     []Aborted:        []Colonoscopy  []Flex Sigmoid []EUS, rectal     []Biopsy   []Snare Polypectomy    []Control of bleeding  []Destruction of lesion by [facility]    []Stent Placement  []Foreign Body Removal    []Dilation    []Other:    []Aborted:   []Tattoo    Indication:    dysphagia, GERD, nausea, upper abdominal pain     History:  The patient is a 27 y.o. female with schizophrenia seen by Kary Villavicencio CNP 22 with dysphagia, GERD, nausea, bloating, upper abdominal pain, constipation, and chest pain. She denies NSAID use. She's taken pantoprazole for 6 months without improvement. I performed an EGD with dilation to 54F on 22 that demonstrated mild gastritis with biopsies negative for Hpylori. She had some retained gastric fluid, and I placed her on bid sucralfate. GES 22 demonstrated gastroparesis. When she saw Kary Villavicencio CNP 22, she was continuing to have dysphagia, GERD, nausea, upper abdominal pain. Physical Exam:  VITALS: BP (!) 143/98   Pulse (!) 120   Temp 96.9 °F (36.1 °C) (Temporal)   Resp 16   Ht 5' 3\" (1.6 m)   Wt (!) 304 lb 12.8 oz (138.3 kg)   SpO2 95%   BMI 53.99 kg/m²   The patient is a 32 y.o. female in no acute distress. HEAD: Normal cephalic/atraumatic. Extra-occular motions intact bilaterally. NECK: No lymphadenopathy or bruits. CHEST: Rises equally on inspiration. Clear to auscultation bilaterally.    CARDIOVASCULAR: Regular rate and rhythm without murmurs, rubs or gallops. ABDOMEN: Soft, nontender, and nondistended with normal bowel sounds. No Hepatosplemomegaly. UPPER EXTREMITIES: no cyanosis, clubbing, or edema. DERM: no rash or jaundice. LOWER EXTREMITIES: no cyanosis, clubbing, or edema. NEURO: Alert and oriented times four. Patient moves all extremities and has gross sensation in all extremities. ASA: ASA 3 - Patient with moderate systemic disease with functional limitations    MEDICATION:    MAC/Propofol/Anesthesia:  Yes     Photo:  Yes  Biopsy:  No      Description of Procedure: The risks and benefits of the procedure were described to the patient or their representative if unable to give consent including but not limited to bleeding, infection, poking a hole someplace requiring surgery to fix it, having a reaction to medication, and death. The patient or their representative if unable to give consent understood these risks and provided informed consent. Airway was assessed and is adequate for the planned sedation. Anesthesia: MAC  Estimated Blood Loss: less than 50   Complications: None  Specimens: Was Not Obtained     Sedation was administered by anesthesia who monitored the patient during the procedure. The patient was placed in the left lateral decubitus position. A forward-viewing Olympus endoscope was lubricated and inserted through the mouth into the oropharynx. Under direct visualization, the upper esophagus was intubated. The scope was advanced through the esophagus and stomach to second portion of duodenum. Scope was slowly withdrawn with good views of mucosal surfaces. The scope was retroflexed in the fundus. Findings and maneuvers are listed in impression below. The patient tolerated the procedure well. The scope was removed. There were no immediate complications. IMPRESSION:  1. Esophagus - normal   2. Stomach - mild gastritis in cardia likely from wretching  3. Duodenum - normal   4.   Empiric dilation performed of the esophagus with a 61 [employer] dilator over a guidewire. There is no resistance at the UES on passing the dilator. Upon removal, the tips of dilator and guidewire are intact. The endoscope was reinserted. There are no mucosal tears. 5.  Gastroparesis  6. Polypharmacy    RECOMMENDATIONS:    1. Await pathology   2. Post-esophageal dilation recommendations including soft diet and cepacol spray or lozenges as needed for sore throat. 3.  Discuss with psychiatry which medications may be causing nausea  4. Follow up with Tana Coronel CNP IN 6-8 weeks at  [address] [place] [address].  Yousuf Ramos, [address] [facility]   Office will call patient to schedule    Thais Rico MD  12:29 PM 11/7/2022

## 2022-11-07 NOTE — DISCHARGE INSTRUCTIONS
1. Await pathology   2. Post-esophageal dilation recommendations including soft diet and cepacol spray or lozenges as needed for sore throat. 3.  Discuss with psychiatry which medications may be causing nausea  4. Follow up with Avis Warner CNP IN 6-8 weeks at  [address] [facility].  [address] [facility], [address] [facility]   Office will call patient to schedule

## 2022-11-07 NOTE — ANESTHESIA POSTPROCEDURE EVALUATION
Department of Anesthesiology  Postprocedure Note    Patient: Rodney Murillo  MRN: [id number]  [place]: 1995  Date of evaluation: 11/7/2022      Procedure Summary     Date: 11/07/22 Room / Location: [address] [place] [address]    Anesthesia Start: 0916 Anesthesia Stop: 6677    Procedure: EGD DILATION Diagnosis:       Dysphagia, unspecified type      (Dysphagia, unspecified type [R13.10])    Surgeons: Nash Rodriguez MD Responsible Provider: Edna Mandujano DO    Anesthesia Type: MAC ASA Status: 3          Anesthesia Type: MAC    Veronica Phase I: Veronica Score: 10    Veronica Phase II:        Anesthesia Post Evaluation    Patient location during evaluation: bedside  Patient participation: complete - patient participated  Level of consciousness: awake  Pain score: 0  Airway patency: patent  Nausea & Vomiting: no nausea and no vomiting  Complications: no  Cardiovascular status: hemodynamically stable  Respiratory status: acceptable  Hydration status: stable

## 2022-11-15 ENCOUNTER — OFFICE VISIT (OUTPATIENT)
Dept: CARDIOLOGY CLINIC | Age: 27
End: 2022-11-15
Payer: MEDICAID

## 2022-11-15 VITALS
WEIGHT: 293 LBS | SYSTOLIC BLOOD PRESSURE: 111 MMHG | BODY MASS INDEX: 48.82 KG/M2 | HEART RATE: 94 BPM | HEIGHT: 65 IN | DIASTOLIC BLOOD PRESSURE: 69 MMHG

## 2022-11-15 DIAGNOSIS — R06.02 SOB (SHORTNESS OF BREATH) ON EXERTION: ICD-10-CM

## 2022-11-15 DIAGNOSIS — E66.01 MORBID OBESITY (HCC): ICD-10-CM

## 2022-11-15 DIAGNOSIS — R00.0 INAPPROPRIATE SINUS TACHYCARDIA: ICD-10-CM

## 2022-11-15 DIAGNOSIS — R00.2 INTERMITTENT PALPITATIONS: Primary | ICD-10-CM

## 2022-11-15 DIAGNOSIS — R07.89 ATYPICAL CHEST PAIN: ICD-10-CM

## 2022-11-15 DIAGNOSIS — R42 DIZZINESS ON STANDING: ICD-10-CM

## 2022-11-15 PROCEDURE — G8417 CALC BMI ABV UP PARAM F/U: HCPCS | Performed by: INTERNAL MEDICINE

## 2022-11-15 PROCEDURE — G8427 DOCREV CUR MEDS BY ELIG CLIN: HCPCS | Performed by: INTERNAL MEDICINE

## 2022-11-15 PROCEDURE — 99214 OFFICE O/P EST MOD 30 MIN: CPT | Performed by: INTERNAL MEDICINE

## 2022-11-15 PROCEDURE — G8484 FLU IMMUNIZE NO ADMIN: HCPCS | Performed by: INTERNAL MEDICINE

## 2022-11-15 PROCEDURE — 4004F PT TOBACCO SCREEN RCVD TLK: CPT | Performed by: INTERNAL MEDICINE

## 2022-11-15 NOTE — PROGRESS NOTES
Chief Complaint   Patient presents with    6 Month Follow-Up     tachycardia       Saw dr. Mey Stuart for pre op eval BEFORE      ORIGINALLY Came 10/27/21 for palpitation, CP,arm numbness, sob  And dizziness for over 2 yrs after she started injection for schizophrenia once in 3 week          6 month follow up. EKG done 5-. Alber Sanford stated her main problem  insomnia and day time somnolence     Occasional dizziness on walk     Sob on exertion    PALPITATION BETTER NOW USED TO BE DAILY  NOW ON LOPRESSOR  IVABARDINE INS DECLINED    DENIED  EDEMA    EKG DONE 10-. CONT TO HAVE CP SHARP -ATYPICAL ONCE IN A WHILE -CHONIC FOR OVER 3 YRS  Seen in hospital several times for cp none was found    Still get hallucination AND WAKING IN THE NIGHT WITH PANIC LIKE SITUATION  SEES DR. MYERS FOR PSYCH PROBLEMS    Smoke 1ppd for 14 yrs    FHx  Father had heart issue -detail unknown        Past Surgical History:   Procedure Laterality Date    CHOLECYSTECTOMY, LAPAROSCOPIC N/A 5/5/2021    ROBOTIC CHOLECYSTECTOMY performed by Mayte Rutledge MD at [place] [address]  02/2021    Dr. Carroll Barbosa    EGD  02/2021    [place]    FOOT TENDON SURGERY Left 9/20/2021    LEFT FOOT EXCISION OF ACCESSORY NAVICULAR WITH RECONSTRUCTION OF POSTERIOR TIBIAL TENDON performed by Amber Trevizo DPM at [address] [place] [address] Right 6/21/2022    Right Foot Excision of Accessory Navicular with Reconstruction of Posterior Tibial Tendon performed by Amber Trevizo DPM at [address] [facility] N/A 2/7/2022    EGD DILATION SAVORY performed by Alfonzo Hutchinson MD at [facility] [address] Left 2/7/2022    EGD BIOPSY performed by Alfonzo Hutchinson MD at [place] [address] N/A 11/7/2022    EGD DILATION performed by Alfonzo Hutchinson MD at [address] [place] [address]         No Known Allergies     Family History   Problem Relation Age of Onset    Diabetes Mother     Cancer Maternal Grandmother     Cancer Paternal Grandmother     Colon Cancer Neg Hx     Colon Polyps Neg Hx     Esophageal Cancer Neg Hx         Social History     Socioeconomic History    Marital status: Single     Spouse name: Not on file    Number of children: Not on file    Years of education: Not on file    Highest education level: Not on file   Occupational History    Not on file   Tobacco Use    Smoking status: Every Day     Packs/day: 0.50     Types: Cigarettes    Smokeless tobacco: Never   Vaping Use    Vaping Use: Never used   Substance and Sexual Activity    Alcohol use: Not Currently    Drug use: No    Sexual activity: Yes     Partners: Male   Other Topics Concern    Not on file   Social History Narrative    Not on file     Social Determinants of Health     Financial Resource Strain: Not on file   Food Insecurity: Not on file   Transportation Needs: Not on file   Physical Activity: Not on file   Stress: Not on file   Social Connections: Not on file   Intimate Partner Violence: Not on file   Housing Stability: Not on file       Current Outpatient Medications   Medication Sig Dispense Refill    glipiZIDE (GLUCOTROL) 5 MG tablet Take 0.5 tablets by mouth daily 30 tablet 3    doxepin (SINEQUAN) 50 MG capsule Take 50 mg by mouth nightly      Cetirizine HCl (ALLERGY RELIEF) 10 MG CAPS Take 1 capsule by mouth daily      Probiotic Product (PROBIOTIC-10 PO) Take by mouth      metoprolol tartrate (LOPRESSOR) 50 MG tablet Take 1 tablet by mouth 2 times daily 60 tablet 11    Multiple Vitamins-Minerals (THERAPEUTIC MULTIVITAMIN-MINERALS) tablet Take 1 tablet by mouth daily      acetaminophen (TYLENOL) 500 MG tablet Take 1,000 mg by mouth every morning      cloZAPine (CLOZARIL) 25 MG tablet 125 mg BID (combines with 100mg tablet)      sucralfate (CARAFATE) 1 GM tablet Take 1 tablet by mouth 2 times daily 5-60\" before lunch and dinner 60 tablet 5    benztropine (COGENTIN) 0.5 MG tablet Take 0.5 mg by mouth 2 times daily      prazosin (MINIPRESS) 2 MG capsule Take 2 mg by mouth \"Takes for dreams\"      cloZAPine (CLOZARIL) 100 MG tablet Take 125 mg by mouth 2 times daily       ondansetron (ZOFRAN) 4 MG tablet Take 1 tablet by mouth every 12 hours as needed for Nausea or Vomiting 60 tablet 1    busPIRone (BUSPAR) 15 MG tablet 15 mg 4 times daily       pantoprazole (PROTONIX) 40 MG tablet Take 1 tablet by mouth every morning (before breakfast) 30 tablet 6    clonazePAM (KLONOPIN) 0.5 MG tablet Take 0.5 mg by mouth 2 times daily as needed for Anxiety. No current facility-administered medications for this visit. Review of Systems -     General ROS: negative  Psychological ROS: negative  Hematological and Lymphatic ROS: No history of blood clots or bleeding disorder. Respiratory ROS: no cough,  or wheezing, the rest see HPI  Cardiovascular ROS: See HPI  Gastrointestinal ROS: negative  Genito-Urinary ROS: no dysuria, trouble voiding, or hematuria  Musculoskeletal ROS: negative  Neurological ROS: no TIA or stroke symptoms  Dermatological ROS: negative      Blood pressure 111/69, pulse 94, height 5' 4.5\" (1.638 m), weight (!) 308 lb 9.6 oz (140 kg).         Physical Examination:    General appearance - alert, well appearing, and in no distress  HEENT- Pink conjunctiva  , Non-icteri sclera,PERRLA  Mental status - alert, oriented to person, place, and time  Neck - supple, no significant adenopathy, no JVD, or carotid bruits  Chest - clear to auscultation, no wheezes, rales or rhonchi, symmetric air entry  Heart - normal rate, regular rhythm, normal S1, S2, no murmurs, rubs, clicks or gallops  Abdomen - soft, nontender, nondistended, no masses or organomegaly  CARROLL- no CVA or flank tenderness, no suprapubic tenderness  Neurological - alert, oriented, normal speech, no focal findings or movement disorder noted  Musculoskeletal/limbs - no joint tenderness, deformity or swelling   - peripheral pulses normal, no pedal edema, no clubbing or cyanosis  Skin - normal coloration and turgor, no rashes, no suspicious skin lesions noted  Psych- appropriate mood and affect    Lab  No results for input(s): CKTOTAL, CKMB, CKMBINDEX, TROPONINI in the last 72 hours. CBC:   Lab Results   Component Value Date/Time    WBC 15.0 06/28/2022 12:00 AM    RBC 4.64 06/28/2022 12:00 AM    HGB 13.3 06/28/2022 12:00 AM    HCT 43.5 06/28/2022 12:00 AM    MCV 92.6 05/19/2022 04:51 PM    MCH 28.7 06/28/2022 12:00 AM    MCHC 30.6 06/28/2022 12:00 AM     06/28/2022 12:00 AM    MPV 10.2 05/19/2022 04:51 PM     BMP:    Lab Results   Component Value Date/Time     09/09/2022 08:45 AM    K 4.5 09/09/2022 08:45 AM    K 4.0 05/27/2021 11:23 AM     09/09/2022 08:45 AM    CO2 23 09/09/2022 08:45 AM    BUN 9 09/09/2022 08:45 AM    LABALBU 4.0 09/09/2022 08:45 AM    CREATININE 0.7 09/09/2022 08:45 AM    CALCIUM 9.1 09/09/2022 08:45 AM    LABGLOM >90 09/09/2022 08:45 AM    GLUCOSE 141 09/09/2022 08:45 AM     Hepatic Function Panel:    Lab Results   Component Value Date/Time    ALKPHOS 113 09/09/2022 08:45 AM    ALT 29 09/09/2022 08:45 AM    AST 20 09/09/2022 08:45 AM    PROT 7.0 09/09/2022 08:45 AM    BILITOT <0.2 09/09/2022 08:45 AM    BILIDIR <0.2 07/16/2021 02:33 PM    LABALBU 4.0 09/09/2022 08:45 AM     Magnesium:    Lab Results   Component Value Date/Time    MG 1.8 04/27/2021 03:35 PM     Warfarin PT/INR:  No components found for: PTPATWAR, PTINRWAR  HgBA1c:    Lab Results   Component Value Date/Time    LABA1C 7.1 07/27/2022 01:18 PM     FLP:    Lab Results   Component Value Date/Time    TRIG 168 09/09/2022 08:45 AM    HDL 39 09/09/2022 08:45 AM    LDLCALC 104 09/09/2022 08:45 AM     TSH:    Lab Results   Component Value Date/Time    TSH 1.270 02/08/2022 12:00 AM       Conclusions      Summary   Normal left ventricle size and systolic function. Ejection fraction was   estimated at 55 to 60 %.  There were no regional left ventricular wall   motion abnormalities and wall thickness was within normal limits. Signature      ----------------------------------------------------------------   Electronically signed by Mercedez Gotti MD (Interpreting   physician) on 11/02/2021 at 08:14 PM       IMPRESSION:       1. CT coronary artery calcium score of 0 indicates low risk for coronary artery disease. 2. Grossly normal coronary arteries. The distal vessels cannot be assessed. This is due to motion artifact from the patient's elevated heart rate. 3. Normal cardiac function. This report has been created using voice recognition software. It may contain minor errors which are inherent in voice recognition technology. Final report electronically signed by Dr. Cassandra Hermosillo on 12/15/2021 7:31 AM           50 HRS HOLTER  DIARY  Diary not completed     CONCLUSION:  *   Normal Sinus rhythm    Average Heart Rate 116 bp Range from  87 bpm to  153 bpm   Rare Premature atrial complexes -2  Rare Premature ventricular complexes -12  No long pause or profound bradycardia  No Supraventricular Tachycardia   No Atrial Fibrillation          Confirmed by Joselito Lima MD, Christa Eid ([id number]) on 11/7/2021 12:10:31 PM    tsh wnl 10.2021    ekg 10/27/21  Sinus  Tachycardia  125 bop  Low voltage in precordial leads. -RSR(V1) -nondiagnostic. ABNORMAL     ekg 5/17/22  Sinus  Rhythm   Low voltage in precordial leads. -RSR(V1) -nondiagnostic. ABNORMAL           Assessment   Diagnosis Orders   1. Intermittent palpitations        2. Dizziness on standing        3. Inappropriate sinus tachycardia        4. SOB (shortness of breath) on exertion        5. hX OF Atypical chest pain- CHRONIC, CORONARY CTA WNL        6.  Morbid obesity ([address]            Hx of recent left foot surgery walk with crunches    Plan     The most current  meds and labs reviewed    Continue the current treatment and with constant vigilance to changes in symptoms and also any potential side effects. Return for care or seek medical attention immediately if symptoms got worse and/or develop new symptoms. PALPITATION/SINUS TACHY BETTER  TSH WNL oct 2021  Cbc and BMP wnl  Echo' WNL  48 holter- SINSU TACHY AVERAGE 116  Cont   lopressor 50 BID   Ins DECLINED ivabardine  5 BID    RECURRENT CP- ATYPICAL ONE- CHRONIC   Coronary CTA  WNL dec 2021    D/W THE PAT THE RESULT    D/w the pat the plan    ALL Q ANSWERED    [place] is Stable from cardiac stand    Insomnia  and advised to see pcp    Advised to lose wt    Discussed use, benefit, and side effects of prescribed medications. All patient questions answered. Pt voiced understanding. Instructed to continue current medications, diet and exercise. Continue risk factor modification and medical management. Patient agreed with treatment plan. Follow up as directed.       RTC IN 5 MONTHS      Julia [place], [facility]

## 2022-11-17 ENCOUNTER — NURSE ONLY (OUTPATIENT)
Dept: LAB | Age: 27
End: 2022-11-17

## 2022-11-17 DIAGNOSIS — E11.65 TYPE 2 DIABETES MELLITUS WITH HYPERGLYCEMIA, WITHOUT LONG-TERM CURRENT USE OF INSULIN (HCC): ICD-10-CM

## 2022-11-17 LAB — PREGNANCY, URINE: NEGATIVE

## 2022-11-18 ENCOUNTER — HOSPITAL ENCOUNTER (OUTPATIENT)
Dept: MRI IMAGING | Age: 27
Discharge: HOME | End: 2022-11-18
Payer: MEDICAID

## 2022-11-18 DIAGNOSIS — E22.1 HYPERPROLACTINEMIA (HCC): ICD-10-CM

## 2022-11-18 LAB — POC CREATININE WHOLE BLOOD: 0.7 MG/DL (ref 0.5–1.2)

## 2022-11-18 PROCEDURE — A9579 GAD-BASE MR CONTRAST NOS,1ML: HCPCS | Performed by: INTERNAL MEDICINE

## 2022-11-18 PROCEDURE — 82565 ASSAY OF CREATININE: CPT

## 2022-11-18 PROCEDURE — 70553 MRI BRAIN STEM W/O & W/DYE: CPT

## 2022-11-18 PROCEDURE — 6360000004 HC RX CONTRAST MEDICATION: Performed by: INTERNAL MEDICINE

## 2022-11-18 RX ADMIN — GADOTERIDOL 20 ML: 279.3 INJECTION, SOLUTION INTRAVENOUS at 11:20

## 2022-11-21 ENCOUNTER — HOSPITAL ENCOUNTER (OUTPATIENT)
Dept: PHYSICAL THERAPY | Age: 27
Setting detail: THERAPIES SERIES
Discharge: HOME | End: 2022-11-21
Payer: MEDICAID

## 2022-11-21 PROCEDURE — 97530 THERAPEUTIC ACTIVITIES: CPT

## 2022-11-21 PROCEDURE — 97110 THERAPEUTIC EXERCISES: CPT
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
## 2022-12-05 ENCOUNTER — HOSPITAL ENCOUNTER (OUTPATIENT)
Dept: PHYSICAL THERAPY | Age: 27
Setting detail: THERAPIES SERIES
Discharge: HOME | End: 2022-12-05
Payer: MEDICAID

## 2022-12-05 PROCEDURE — 97140 MANUAL THERAPY 1/> REGIONS: CPT

## 2022-12-05 PROCEDURE — 97530 THERAPEUTIC ACTIVITIES: CPT
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
## 2022-12-19 ENCOUNTER — HOSPITAL ENCOUNTER (OUTPATIENT)
Dept: PHYSICAL THERAPY | Age: 27
Setting detail: THERAPIES SERIES
End: 2022-12-19
Payer: MEDICAID

## 2022-12-20 NOTE — DISCHARGE SUMMARY
[facility] NOTE  OUTPATIENT  Specialized Therapy Services    Patient Name: Tangela Holcomb        CSN: [id number]   YOB: 1995  Gender: female  NAMAN Kahtleen -*,    [facility] ,      Patient is discharged from Physical Therapy services at this time. See last note for details related to results of therapy and goal achievement. Reason for discharge: Patient requested to be discharged. . Pt is d/c from skilled PT services at this time.       Valencia Martinez PT, DPT

## 2023-02-13 ENCOUNTER — HOSPITAL ENCOUNTER (EMERGENCY)
Age: 28
Discharge: HOME | End: 2023-02-13
Attending: STUDENT IN AN ORGANIZED HEALTH CARE EDUCATION/TRAINING PROGRAM
Payer: MEDICAID

## 2023-02-13 VITALS
WEIGHT: 293 LBS | SYSTOLIC BLOOD PRESSURE: 164 MMHG | BODY MASS INDEX: 50.02 KG/M2 | TEMPERATURE: 97.6 F | DIASTOLIC BLOOD PRESSURE: 100 MMHG | RESPIRATION RATE: 16 BRPM | OXYGEN SATURATION: 96 % | HEART RATE: 102 BPM | HEIGHT: 64 IN

## 2023-02-13 DIAGNOSIS — K62.5 RECTAL BLEEDING: Primary | ICD-10-CM

## 2023-02-13 LAB
ALBUMIN SERPL BCG-MCNC: 3.8 G/DL (ref 3.5–5.1)
ALP SERPL-CCNC: 101 U/L (ref 38–126)
ALT SERPL W/O P-5'-P-CCNC: 30 U/L (ref 11–66)
ANION GAP SERPL CALC-SCNC: 9 MEQ/L (ref 8–16)
AST SERPL-CCNC: 19 U/L (ref 5–40)
B-HCG SERPL QL: NEGATIVE
BASOPHILS ABSOLUTE: 0 THOU/MM3 (ref 0–0.1)
BASOPHILS NFR BLD AUTO: 0.3 %
BILIRUB SERPL-MCNC: < 0.2 MG/DL (ref 0.3–1.2)
BUN SERPL-MCNC: 7 MG/DL (ref 7–22)
CALCIUM SERPL-MCNC: 9 MG/DL (ref 8.5–10.5)
CHLORIDE SERPL-SCNC: 105 MEQ/L (ref 98–111)
CO2 SERPL-SCNC: 24 MEQ/L (ref 23–33)
CREAT SERPL-MCNC: 0.7 MG/DL (ref 0.4–1.2)
DEPRECATED RDW RBC AUTO: 47.2 FL (ref 35–45)
EOSINOPHIL NFR BLD AUTO: 0.1 %
EOSINOPHILS ABSOLUTE: 0 THOU/MM3 (ref 0–0.4)
ERYTHROCYTE [DISTWIDTH] IN BLOOD BY AUTOMATED COUNT: 14 % (ref 11.5–14.5)
GFR SERPL CREATININE-BSD FRML MDRD: > 60 ML/MIN/1.73M2
GLUCOSE SERPL-MCNC: 157 MG/DL (ref 70–108)
HCT VFR BLD AUTO: 39.1 % (ref 37–47)
HGB BLD-MCNC: 12.4 GM/DL (ref 12–16)
IMM GRANULOCYTES # BLD AUTO: 0.11 THOU/MM3 (ref 0–0.07)
IMM GRANULOCYTES NFR BLD AUTO: 0.7 %
LYMPHOCYTES ABSOLUTE: 4.2 THOU/MM3 (ref 1–4.8)
LYMPHOCYTES NFR BLD AUTO: 27.9 %
MCH RBC QN AUTO: 29.2 PG (ref 26–33)
MCHC RBC AUTO-ENTMCNC: 31.7 GM/DL (ref 32.2–35.5)
MCV RBC AUTO: 92.2 FL (ref 81–99)
MONOCYTES ABSOLUTE: 1 THOU/MM3 (ref 0.4–1.3)
MONOCYTES NFR BLD AUTO: 6.5 %
NEUTROPHILS NFR BLD AUTO: 64.5 %
NRBC BLD AUTO-RTO: 0 /100 WBC
OSMOLALITY SERPL CALC.SUM OF ELEC: 276.9 MOSMOL/KG (ref 275–300)
PLATELET # BLD AUTO: 339 THOU/MM3 (ref 130–400)
PMV BLD AUTO: 9.8 FL (ref 9.4–12.4)
POTASSIUM SERPL-SCNC: 3.9 MEQ/L (ref 3.5–5.2)
PROT SERPL-MCNC: 6.9 G/DL (ref 6.1–8)
RBC # BLD AUTO: 4.24 MILL/MM3 (ref 4.2–5.4)
SEGMENTED NEUTROPHILS ABSOLUTE COUNT: 9.7 THOU/MM3 (ref 1.8–7.7)
SODIUM SERPL-SCNC: 138 MEQ/L (ref 135–145)
WBC # BLD AUTO: 15.1 THOU/MM3 (ref 4.8–10.8)

## 2023-02-13 PROCEDURE — 36415 COLL VENOUS BLD VENIPUNCTURE: CPT

## 2023-02-13 PROCEDURE — 99283 EMERGENCY DEPT VISIT LOW MDM: CPT

## 2023-02-13 PROCEDURE — 80053 COMPREHEN METABOLIC PANEL: CPT

## 2023-02-13 PROCEDURE — 85025 COMPLETE CBC W/AUTO DIFF WBC: CPT

## 2023-02-13 PROCEDURE — 84703 CHORIONIC GONADOTROPIN ASSAY: CPT

## 2023-02-13 ASSESSMENT — PAIN DESCRIPTION - DESCRIPTORS: DESCRIPTORS: ACHING

## 2023-02-13 ASSESSMENT — PAIN SCALES - GENERAL: PAINLEVEL_OUTOF10: 2

## 2023-02-13 ASSESSMENT — PAIN DESCRIPTION - LOCATION: LOCATION: BACK

## 2023-02-13 ASSESSMENT — PAIN - FUNCTIONAL ASSESSMENT: PAIN_FUNCTIONAL_ASSESSMENT: 0-10

## 2023-02-14 NOTE — ED NOTES
Assisted Dr Rubio Garza at this time with rectal exam. Pt tolerated well     Drake Cruz RN  02/13/23 5284

## 2023-02-14 NOTE — DISCHARGE INSTRUCTIONS
Your work-up today was reassuring. Your hemoglobin is stable. Your vital signs are stable. You have a superficial abrasion between your butt cheeks that is likely the source of the blood you noted on the tissue however this is not exclude that you may in fact have internal hemorrhoids or other reasons for lower GI bleed. This is all likely related to your constipation. I recommend taking MiraLAX daily in order to aid in softer bowel movements. Please call the gastroenterology office to schedule a follow-up appointment as you may require a colonoscopy for further evaluation. If you have any new concerns please return to the emergency department for reevaluation.

## 2023-02-14 NOTE — ED TRIAGE NOTES
Pt presents to the ED c/o rectal bleeding. Pt states that she has been having this issue on and off for the last couple months and has been following a GI specialist for this. Pt reports that today she was attempting to have a bowel movement and noticed some blood. Pt states that she has had issues with constipation. Pt is alert and oriented, respirations are equal and unlabored.

## 2023-02-14 NOTE — ED PROVIDER NOTES
[facility] [address] [address] EMERGENCY DEPT      EMERGENCY MEDICINE     Pt Name: Ovidio Erickson  MRN: [id number]  [place] 1995  Date of evaluation: 2/13/2023  Provider: Edith Freitas MD    CHIEF COMPLAINT       Chief Complaint   Patient presents with    Rectal Bleeding     HISTORY OF PRESENT ILLNESS   Ovidio Erickson is a pleasant 32 y.o. female who presents to the emergency department from from home, by private vehicle for evaluation of rectal bleeding. Patient reports she has a history of chronic constipation and has to push hard to have a bowel movement. This is a chronic issue. Patient reports she intermittently has blood on the toilet paper when wiping and noted this today. She describes the total amount of blood to be dime sized and denies any continuous or persistent bleeding. She denies any pain. She denies abdominal pain, nausea, vomiting. She denies diarrhea. She reports the blood was bright in color as opposed to melena. She denies back pain. She denies flank pain. She denies fever. She denies vaginal bleeding or possibility of pregnancy. She denies dysuria, hematuria, increased urinary frequency or urgency.     PASTMEDICAL HISTORY     Past Medical History:   Diagnosis Date    Arthritis     Bipolar 1 disorder ([facility]     Diabetes mellitus ([facility]     Fibromyalgia     Gastroparesis 2022    PONV (postoperative nausea and vomiting)     Pre-diabetes     Psychosis ([facility])     Schizophrenia ([facility])     Stomach pain        Patient Active Problem List   Diagnosis Code    Schizophrenia ([facility] F20.9    Tachycardia R00.0    Sepsis ([facility] A41.9    Leukocytosis D72.829    Normocytic anemia D64.9    Bilateral atelectasis H47.38    Metabolic acidosis Z25.21    hX OF Atypical chest pain- CHRONIC, CORONARY CTA WNL R07.89    Morbid obesity ([facility]) E66.01    Bipolar 1 disorder ([facility]) F31.9    Tobacco abuse Z72.0    BMI 40.0-44.9, adult ([facility]) Q71.58    Biliary colic X36.97    Intermittent palpitations R00.2    SOB (shortness of breath) on exertion R06.02    Sinus tachycardia R00.0    Inappropriate sinus tachycardia R00.0    Dizziness on standing R42    Pre-diabetes R73.03    Stomach pain R10.9    Gastroparesis due to DM ([facility] E11.43, K31.84     SURGICAL HISTORY       Past Surgical History:   Procedure Laterality Date    CHOLECYSTECTOMY, LAPAROSCOPIC N/A 5/5/2021    ROBOTIC CHOLECYSTECTOMY performed by Feroz Palomares MD at [place] [address]  02/2021    Dr. Cisse     EGD  02/2021    [place]    FOOT TENDON SURGERY Left 9/20/2021    LEFT FOOT EXCISION OF ACCESSORY NAVICULAR WITH RECONSTRUCTION OF POSTERIOR TIBIAL TENDON performed by Awais Maier DPM at [facility] Right 6/21/2022    Right Foot Excision of Accessory Navicular with Reconstruction of Posterior Tibial Tendon performed by Awais Maier DPM at [facility] N/A 2/7/2022    EGD DILATION SAVORY performed by Noemy Smith MD at [place] [address] Left 2/7/2022    EGD BIOPSY performed by Noemy Smith MD at [facility] 11/7/2022    EGD DILATION performed by Noemy Smith MD at [facility]       Previous Medications    ACETAMINOPHEN (TYLENOL) 500 MG TABLET    Take 1,000 mg by mouth every morning    BENZTROPINE (COGENTIN) 0.5 MG TABLET    Take 0.5 mg by mouth 2 times daily    BUSPIRONE (BUSPAR) 15 MG TABLET    15 mg 4 times daily     CETIRIZINE HCL (ALLERGY RELIEF) 10 MG CAPS    Take 1 capsule by mouth daily    CLONAZEPAM (KLONOPIN) 0.5 MG TABLET    Take 0.5 mg by mouth 2 times daily as needed for Anxiety.      CLOZAPINE (CLOZARIL) 100 MG TABLET    Take 125 mg by mouth 2 times daily     CLOZAPINE (CLOZARIL) 25 MG TABLET    125 mg BID (combines with 100mg tablet)    DOXEPIN (SINEQUAN) 50 MG CAPSULE    Take 50 mg by mouth nightly    GLIPIZIDE (GLUCOTROL) 5 MG TABLET    Take 0.5 tablets by mouth daily    METOPROLOL TARTRATE (LOPRESSOR) 50 MG TABLET    Take 1 tablet by mouth 2 times daily    MULTIPLE VITAMINS-MINERALS (THERAPEUTIC MULTIVITAMIN-MINERALS) TABLET    Take 1 tablet by mouth daily    ONDANSETRON (ZOFRAN) 4 MG TABLET    Take 1 tablet by mouth every 12 hours as needed for Nausea or Vomiting    PANTOPRAZOLE (PROTONIX) 40 MG TABLET    Take 1 tablet by mouth every morning (before breakfast)    PRAZOSIN (MINIPRESS) 2 MG CAPSULE    Take 2 mg by mouth \"Takes for dreams\"    PROBIOTIC PRODUCT (PROBIOTIC-10 PO)    Take by mouth    SUCRALFATE (CARAFATE) 1 GM TABLET    Take 1 tablet by mouth 2 times daily 5-60\" before lunch and dinner       ALLERGIES     has No Known Allergies. FAMILY HISTORY     She indicated that her mother is alive. She indicated that her father is alive. She indicated that the status of her maternal grandmother is unknown. She indicated that the status of her paternal grandmother is unknown. She indicated that the status of her neg hx is unknown.        SOCIAL HISTORY       Social History     Tobacco Use    Smoking status: Every Day     Packs/day: 0.50     Types: Cigarettes    Smokeless tobacco: Never   Vaping Use    Vaping Use: Never used   Substance Use Topics    Alcohol use: Not Currently    Drug use: No       PHYSICAL EXAM       ED Triage Vitals [02/13/23 2122]   BP Temp Temp Source Heart Rate Resp SpO2 Height Weight   (!) 164/100 97.6 °F (36.4 °C) Oral (!) 102 16 96 % 5' 4\" (1.626 m) 300 lb (136.1 kg)       Additional Vital Signs:  Vitals:    02/13/23 2122   BP: (!) 164/100   Pulse: (!) 102   Resp: 16   Temp: 97.6 °F (36.4 °C)   SpO2: 96%     Physical Exam  Constitutional:  Well developed, well nourished, no acute distress, non-toxic appearance   Eyes:  conjunctiva normal and without pallor, no scleral icterus  HENT:  Atraumatic, external ears normal, nose normal, oropharynx moist, no pharyngeal exudates  Respiratory:  No respiratory distress, normal breath sounds  Cardiovascular:  Normal rate (88 while resting in bed at the time of my examination), normal rhythm, no murmurs, no gallops, no rubs   GI:  Soft, nondistended, normal bowel sounds, nontender, no organomegaly, no mass, no rebound, no guarding   :  No costovertebral angle tenderness   Anorectal: No external hemorrhoids. Superficial abrasion in the gluteal fold with dried blood without evidence of infection. No anal fissure. Integument:  Well hydrated, no rash, no pallor  Lymphatic:  No lymphadenopathy noted   Neurologic:  Alert & oriented x 3, no focal deficits noted   Psychiatric:  Speech and behavior appropriate  FORMAL DIAGNOSTIC RESULTS     RADIOLOGY: Interpretation per the Radiologist below, if available at the time of this note (none if blank):     No orders to display       LABS: (none if blank)  Labs Reviewed   COMPREHENSIVE METABOLIC PANEL - Abnormal; Notable for the following components:       Result Value    Glucose 157 (*)     Total Bilirubin <0.2 (*)     All other components within normal limits   CBC WITH AUTO DIFFERENTIAL - Abnormal; Notable for the following components:    WBC 15.1 (*)     MCHC 31.7 (*)     RDW-SD 47.2 (*)     Segs Absolute 9.7 (*)     Immature Grans (Abs) 0.11 (*)     All other components within normal limits   HCG, SERUM, QUALITATIVE   ANION GAP   GLOMERULAR FILTRATION RATE, ESTIMATED   OSMOLALITY       (Any cultures that may have been sent were not resulted at the time of this patient visit)    [address] / ED COURSE:     1) Number and Complexity of Problems            Problem List This Visit:         Chief Complaint   Patient presents with    Rectal Bleeding            Differential Diagnosis includes (but not limited to):  GI bleed, anal fissure, internal hemorrhoids, external hemorrhoids        Diagnoses Considered but I have low suspicion of:   Colorectal cancer             Pertinent Comorbid Conditions:    Chronic constipation    2)  Data Reviewed (none if left blank)          My Independent interpretations:         Labs:      Baseline leukocytosis, H&H normal, rest of laboratory evaluation unremarkable                 Decision Rules/Clinical Scores utilized: None            External Documentation Reviewed:         Previous patient encounter documents & history available on EMR was reviewed              See Formal Diagnostic Results above for the lab and radiology tests and orders. 3)  Treatment and Disposition         ED Reassessment: See MDM below         Case discussed with consulting clinician: None         Shared Decision-Making was performed and disposition discussed with the        Patient/Family and questions answered          Social determinants of health impacting treatment or disposition: None        Summary of Patient Presentation:      MDM  /  ED Course as of 02/14/23 0024   Mon Feb 13, 2023 2237 The patient is well-appearing and in no acute distress. She is minimally tachycardic however upon historical review this appears to be the patient's baseline she also has a diagnosis for inappropriate sinus tachycardia. She has a soft, nontender, nondistended abdomen. She has no CVA tenderness. She has a superficial abrasion in the gluteal fold with some dried blood which is likely the source of the blood that she noted on the napkin. She has no external hemorrhoids or anal fissures. Her H&H is baseline. She has leukocytosis however this is also baseline as compared to previous CBC. CMP is unremarkable. No indication for additional imaging at this time. No clinical concern for GI hemorrhage or hemodynamically significant bleeding. Patient already has gastroenterology follow-up established.   She was recommended to follow-up with her gastroenterologist.  She was instructed to return to the emergency department with any concerning symptoms [AM]      ED Course User Index  [AM] Zahida Rust MD     Vitals Reviewed:    Vitals:    02/13/23 2122 BP: (!) 164/100   Pulse: (!) 102   Resp: 16   Temp: 97.6 °F (36.4 °C)   TempSrc: Oral   SpO2: 96%   Weight: 300 lb (136.1 kg)   Height: 5' 4\" (1.626 m)       The patient was seen and examined. Appropriate diagnostic testing was performed and results reviewed with the patient. The results of pertinent diagnostic studies and exam findings were discussed. The patients provisional diagnosis and plan of care were discussed with the patient and present family who expressed understanding. Any medications were reviewed and indications and risks of medications were discussed with the patient /family present. Strict verbal and written return precautions, instructions and appropriate follow-up provided to  the patient. ED Medications administered this visit:  (None if blank)  Medications - No data to display      PROCEDURES: (None if blank)  Procedures:     CRITICAL CARE: (None if blank)      DISCHARGE PRESCRIPTIONS: (None if blank)  New Prescriptions    No medications on file       FINAL IMPRESSION      1. Rectal bleeding          DISPOSITION/PLAN   DISPOSITION    Discharge    OUTPATIENT FOLLOW UP THE PATIENT:  Michel Desai, APRN - [address] . [address]  [phone]    In 3 days      Luis Fry MD  [facility] [address]  [address] [address] [phone]    In 3 days    Discharge instructions: Your work-up today was reassuring. Your hemoglobin is stable. Your vital signs are stable. You have a superficial abrasion between your butt cheeks that is likely the source of the blood you noted on the tissue however this is not exclude that you may in fact have internal hemorrhoids or other reasons for lower GI bleed. This is all likely related to your constipation. I recommend taking MiraLAX daily in order to aid in softer bowel movements. Please call the gastroenterology office to schedule a follow-up appointment as you may require a colonoscopy for further evaluation. If you have any new concerns please return to the emergency department for reevaluation.     MD Josiah Newsome MD  02/14/23 5054

## 2023-03-30 ENCOUNTER — NURSE ONLY (OUTPATIENT)
Dept: LAB | Age: 28
End: 2023-03-30

## 2023-03-30 DIAGNOSIS — E11.65 TYPE 2 DIABETES MELLITUS WITH HYPERGLYCEMIA, WITHOUT LONG-TERM CURRENT USE OF INSULIN (HCC): ICD-10-CM

## 2023-03-30 LAB
DEPRECATED MEAN GLUCOSE BLD GHB EST-ACNC: 159 MG/DL (ref 70–126)
HBA1C MFR BLD HPLC: 7.3 % (ref 4.4–6.4)
HBV SURFACE AG SERPL QL IA: NEGATIVE
HCV IGG SERPL QL IA: NEGATIVE
HIV 1+2 AB+HIV1 P24 AG SERPL QL IA: NONREACTIVE
PROLACTIN SERPL-MCNC: 30.7 NG/ML
T4 FREE SERPL-MCNC: 1.1 NG/DL (ref 0.93–1.76)
TSH SERPL DL<=0.005 MIU/L-ACNC: 2.92 UIU/ML (ref 0.4–4.2)

## 2023-03-31 LAB
BACTERIA UR CULT: ABNORMAL
ORGANISM: ABNORMAL
RPR SER QL: NONREACTIVE

## 2023-03-31 RX ORDER — METOPROLOL TARTRATE 50 MG/1
TABLET, FILM COATED ORAL
Qty: 180 TABLET | Refills: 0 | Status: SHIPPED | OUTPATIENT
Start: 2023-03-31

## 2023-06-21 ENCOUNTER — NURSE ONLY (OUTPATIENT)
Dept: LAB | Age: 28
End: 2023-06-21

## 2023-06-21 DIAGNOSIS — E11.65 TYPE 2 DIABETES MELLITUS WITH HYPERGLYCEMIA, WITHOUT LONG-TERM CURRENT USE OF INSULIN (HCC): ICD-10-CM

## 2023-06-21 LAB
DEPRECATED MEAN GLUCOSE BLD GHB EST-ACNC: 135 MG/DL (ref 70–126)
HBA1C MFR BLD HPLC: 6.5 % (ref 4.4–6.4)

## 2023-06-21 RX ORDER — METOPROLOL TARTRATE 50 MG/1
TABLET, FILM COATED ORAL
Qty: 56 TABLET | OUTPATIENT
Start: 2023-06-21

## 2023-06-21 NOTE — TELEPHONE ENCOUNTER
Patient no showed to last appointment. Called patient. Appointment scheduled for 6-30-23. Patient states she has enough medication until appointment.

## 2023-07-01 RX ORDER — METOPROLOL TARTRATE 50 MG/1
50 TABLET, FILM COATED ORAL 2 TIMES DAILY
Qty: 60 TABLET | Refills: 0 | Status: SHIPPED | OUTPATIENT
Start: 2023-07-01

## 2023-07-21 ENCOUNTER — OFFICE VISIT (OUTPATIENT)
Dept: CARDIOLOGY CLINIC | Age: 28
End: 2023-07-21
Payer: MEDICAID

## 2023-07-21 VITALS
HEART RATE: 84 BPM | HEIGHT: 64 IN | SYSTOLIC BLOOD PRESSURE: 93 MMHG | BODY MASS INDEX: 50.02 KG/M2 | DIASTOLIC BLOOD PRESSURE: 67 MMHG | WEIGHT: 293 LBS

## 2023-07-21 DIAGNOSIS — R00.2 INTERMITTENT PALPITATIONS: ICD-10-CM

## 2023-07-21 DIAGNOSIS — Z72.0 TOBACCO ABUSE: ICD-10-CM

## 2023-07-21 DIAGNOSIS — E66.01 MORBID OBESITY (HCC): ICD-10-CM

## 2023-07-21 DIAGNOSIS — R00.0 INAPPROPRIATE SINUS TACHYCARDIA: Primary | ICD-10-CM

## 2023-07-21 DIAGNOSIS — R07.89 ATYPICAL CHEST PAIN: ICD-10-CM

## 2023-07-21 DIAGNOSIS — R42 DIZZINESS ON STANDING: ICD-10-CM

## 2023-07-21 DIAGNOSIS — R06.02 SOB (SHORTNESS OF BREATH) ON EXERTION: ICD-10-CM

## 2023-07-21 PROCEDURE — 99214 OFFICE O/P EST MOD 30 MIN: CPT | Performed by: INTERNAL MEDICINE

## 2023-07-21 PROCEDURE — G8427 DOCREV CUR MEDS BY ELIG CLIN: HCPCS | Performed by: INTERNAL MEDICINE

## 2023-07-21 PROCEDURE — G8417 CALC BMI ABV UP PARAM F/U: HCPCS | Performed by: INTERNAL MEDICINE

## 2023-07-21 PROCEDURE — 93000 ELECTROCARDIOGRAM COMPLETE: CPT | Performed by: INTERNAL MEDICINE

## 2023-07-21 PROCEDURE — 4004F PT TOBACCO SCREEN RCVD TLK: CPT | Performed by: INTERNAL MEDICINE

## 2023-07-21 RX ORDER — BUSPIRONE HYDROCHLORIDE 10 MG/1
10 TABLET ORAL 2 TIMES DAILY
COMMUNITY

## 2023-07-21 RX ORDER — METOPROLOL TARTRATE 50 MG/1
50 TABLET, FILM COATED ORAL 2 TIMES DAILY
Qty: 180 TABLET | Refills: 3 | Status: SHIPPED | OUTPATIENT
Start: 2023-07-21

## 2023-07-21 NOTE — PROGRESS NOTES
Chief Complaint   Patient presents with    Follow-up     6 mo f/u       Saw dr. Mariah Rojas for pre op eval BEFORE      ORIGINALLY Came 10/27/21 for palpitation, CP,arm numbness, sob  And dizziness for over 2 yrs after she started injection for schizophrenia once in 3 week          6 month follow up. Had sleep study and awaiting for the result    Occasional dizziness on walk - chronic getting worse    Sob on exertion    PALPITATION BETTER NOW USED TO BE DAILY  NOW ON LOPRESSOR  IVABARDINE INS DECLINED    DENIED  EDEMA     EKG DONE 10-. Hx of cp-ATYPICAL ONCE IN A WHILE -CHONIC FOR OVER 3 YRS  Seen in hospital several times for cp none was found    Still get hallucination AND WAKING IN THE NIGHT WITH PANIC LIKE SITUATION  SEES DR. MYERS FOR PSYCH PROBLEMS    Smoke 1ppd for 14 yrs    FHx  Father had heart issue -detail unknown        Past Surgical History:   Procedure Laterality Date    CHOLECYSTECTOMY, LAPAROSCOPIC N/A 5/5/2021    ROBOTIC CHOLECYSTECTOMY performed by Hans Gibson MD at [facility]  02/2021    Dr. Shannan Irvin    EGD  02/2021    [place]    FOOT TENDON SURGERY Left 9/20/2021    LEFT FOOT EXCISION OF ACCESSORY NAVICULAR WITH RECONSTRUCTION OF POSTERIOR TIBIAL TENDON performed by Ashely Josue DPM at [address] Right 6/21/2022    Right Foot Excision of Accessory Navicular with Reconstruction of Posterior Tibial Tendon performed by Ashely Josue DPM at [address] N/A 2/7/2022    EGD DILATION SAVORY performed by Liudmila Castillo MD at [facility]    UPPER GASTROINTESTINAL ENDOSCOPY Left 2/7/2022    EGD BIOPSY performed by Liudmila Castillo MD at [address] [facility] N/A 11/7/2022    EGD DILATION performed by Liudmila Castillo MD at [address] EXTRACTION         No Known Allergies     Family History   Problem Relation Age of Onset    Diabetes Mother     Cancer Maternal Grandmother

## 2023-08-16 ENCOUNTER — HOSPITAL ENCOUNTER (OUTPATIENT)
Dept: NON INVASIVE DIAGNOSTICS | Age: 28
Discharge: HOME | End: 2023-08-16
Payer: MEDICAID

## 2023-08-16 VITALS — WEIGHT: 293 LBS | BODY MASS INDEX: 51.91 KG/M2 | HEIGHT: 63 IN

## 2023-08-16 DIAGNOSIS — R42 DIZZINESS ON STANDING: ICD-10-CM

## 2023-08-16 PROCEDURE — 93660 TILT TABLE EVALUATION: CPT | Performed by: INTERNAL MEDICINE

## 2023-08-17 NOTE — PROCEDURES
[place] [address]                                TILT TABLE TEST    PATIENT NAME: Basia LUKE               :        1995  MED REC NO:   [id number]                           ROOM:  ACCOUNT NO:   [de-identified]                           ADMIT DATE: 2023  PROVIDER:     Evangelina Blake. Nakul Lamas M.D.    [place]:  2023    INDICATION:  Dizziness on standing position. Baseline blood pressure 122/67, pulse rate 82. PROCEDURE DETAIL:  Under continuous EKG monitoring and intermittent  blood pressure monitoring, the patient was allowed to assume standing  position at 70-degree inclination. First minute into tilting; blood  pressure 102/59, pulse rate 91. Second minute into tilting; blood  pressure 97/66, pulse rate 99. Third minute into tilting; blood  pressure 95/57, pulse rate 82. Fourth minute into tilting; blood  pressure 90/55, pulse rate 92. Fifth minute into tilting; blood  pressure 88/54, pulse rate 93. Eighth minute into tilting; blood  pressure 85/51, pulse rate 94. Eleventh minute into tilting; blood  pressure 88/60, pulse rate 92. Then 15 minutes into tilting; blood  pressure 90/52, pulse rate 94. Twenty minutes into tilting; blood  pressure 100/60, pulse rate 94. Thirty minutes into tilting; blood  pressure 101/59, pulse rate 95. Thirty minutes into tilting; the  patient was allowed to assume supine position. Three minutes in the  recovery; blood pressure 109/70, pulse rate 87. SYMPTOMATOLOGY:  The patient was having dizziness in the first 10  minutes significantly.   Initially, in the first minute, putting her up,  the patient felt marked dizziness and dizziness persisted throughout and  basically in the last 15 minutes of the tilting, the dizziness got  better and in the last 10 minutes of the tilting, the patient did not  have any symptoms of dizziness and the dizziness got

## 2023-09-09 ENCOUNTER — APPOINTMENT (OUTPATIENT)
Dept: GENERAL RADIOLOGY | Age: 28
End: 2023-09-09
Payer: MEDICAID

## 2023-09-09 ENCOUNTER — HOSPITAL ENCOUNTER (EMERGENCY)
Age: 28
Discharge: HOME | End: 2023-09-09
Attending: EMERGENCY MEDICINE
Payer: MEDICAID

## 2023-09-09 VITALS
SYSTOLIC BLOOD PRESSURE: 135 MMHG | DIASTOLIC BLOOD PRESSURE: 81 MMHG | OXYGEN SATURATION: 98 % | HEART RATE: 88 BPM | RESPIRATION RATE: 16 BRPM

## 2023-09-09 DIAGNOSIS — R42 LIGHTHEADEDNESS: ICD-10-CM

## 2023-09-09 DIAGNOSIS — R10.13 EPIGASTRIC PAIN: Primary | ICD-10-CM

## 2023-09-09 LAB
ALBUMIN SERPL BCG-MCNC: 3.9 G/DL (ref 3.5–5.1)
ALP SERPL-CCNC: 116 U/L (ref 38–126)
ALT SERPL W/O P-5'-P-CCNC: 23 U/L (ref 11–66)
ANION GAP SERPL CALC-SCNC: 12 MEQ/L (ref 8–16)
AST SERPL-CCNC: 14 U/L (ref 5–40)
B-HCG SERPL QL: NEGATIVE
BACTERIA URNS QL MICRO: ABNORMAL /HPF
BASOPHILS ABSOLUTE: 0 THOU/MM3 (ref 0–0.1)
BASOPHILS NFR BLD AUTO: 0.3 %
BILIRUB SERPL-MCNC: < 0.2 MG/DL (ref 0.3–1.2)
BILIRUB UR QL STRIP.AUTO: NEGATIVE
BUN SERPL-MCNC: 7 MG/DL (ref 7–22)
CALCIUM SERPL-MCNC: 9.4 MG/DL (ref 8.5–10.5)
CASTS #/AREA URNS LPF: ABNORMAL /LPF
CASTS 2: ABNORMAL /LPF
CHARACTER UR: ABNORMAL
CHLORIDE SERPL-SCNC: 104 MEQ/L (ref 98–111)
CO2 SERPL-SCNC: 23 MEQ/L (ref 23–33)
COLOR: YELLOW
CREAT SERPL-MCNC: 0.7 MG/DL (ref 0.4–1.2)
CRYSTALS URNS MICRO: ABNORMAL
DEPRECATED RDW RBC AUTO: 49.9 FL (ref 35–45)
EOSINOPHIL NFR BLD AUTO: 0.2 %
EOSINOPHILS ABSOLUTE: 0 THOU/MM3 (ref 0–0.4)
EPITHELIAL CELLS, UA: ABNORMAL /HPF
ERYTHROCYTE [DISTWIDTH] IN BLOOD BY AUTOMATED COUNT: 14.7 % (ref 11.5–14.5)
FLUAV RNA RESP QL NAA+PROBE: NOT DETECTED
FLUBV RNA RESP QL NAA+PROBE: NOT DETECTED
GFR SERPL CREATININE-BSD FRML MDRD: > 60 ML/MIN/1.73M2
GLUCOSE SERPL-MCNC: 154 MG/DL (ref 70–108)
GLUCOSE UR QL STRIP.AUTO: NEGATIVE MG/DL
HCT VFR BLD AUTO: 39.9 % (ref 37–47)
HGB BLD-MCNC: 12.4 GM/DL (ref 12–16)
HGB UR QL STRIP.AUTO: NEGATIVE
IMM GRANULOCYTES # BLD AUTO: 0.13 THOU/MM3 (ref 0–0.07)
IMM GRANULOCYTES NFR BLD AUTO: 0.8 %
KETONES UR QL STRIP.AUTO: ABNORMAL
LIPASE SERPL-CCNC: 49.2 U/L (ref 5.6–51.3)
LYMPHOCYTES ABSOLUTE: 4 THOU/MM3 (ref 1–4.8)
LYMPHOCYTES NFR BLD AUTO: 24.6 %
MAGNESIUM SERPL-MCNC: 1.8 MG/DL (ref 1.6–2.4)
MCH RBC QN AUTO: 28.9 PG (ref 26–33)
MCHC RBC AUTO-ENTMCNC: 31.1 GM/DL (ref 32.2–35.5)
MCV RBC AUTO: 93 FL (ref 81–99)
MISCELLANEOUS 2: ABNORMAL
MONOCYTES ABSOLUTE: 1 THOU/MM3 (ref 0.4–1.3)
MONOCYTES NFR BLD AUTO: 6.4 %
NEUTROPHILS NFR BLD AUTO: 67.7 %
NITRITE UR QL STRIP: NEGATIVE
NRBC BLD AUTO-RTO: 0 /100 WBC
OSMOLALITY SERPL CALC.SUM OF ELEC: 278.6 MOSMOL/KG (ref 275–300)
PH UR STRIP.AUTO: 5.5 [PH] (ref 5–9)
PLATELET # BLD AUTO: 338 THOU/MM3 (ref 130–400)
PMV BLD AUTO: 10 FL (ref 9.4–12.4)
POTASSIUM SERPL-SCNC: 4.3 MEQ/L (ref 3.5–5.2)
PROT SERPL-MCNC: 6.8 G/DL (ref 6.1–8)
PROT UR STRIP.AUTO-MCNC: NEGATIVE MG/DL
RBC # BLD AUTO: 4.29 MILL/MM3 (ref 4.2–5.4)
RBC URINE: ABNORMAL /HPF
RENAL EPI CELLS #/AREA URNS HPF: ABNORMAL /[HPF]
SARS-COV-2 RNA RESP QL NAA+PROBE: NOT DETECTED
SEGMENTED NEUTROPHILS ABSOLUTE COUNT: 11 THOU/MM3 (ref 1.8–7.7)
SODIUM SERPL-SCNC: 139 MEQ/L (ref 135–145)
SP GR UR REFRACT.AUTO: 1.03 (ref 1–1.03)
TROPONIN, HIGH SENSITIVITY: < 6 NG/L (ref 0–12)
UROBILINOGEN, URINE: 1 EU/DL (ref 0–1)
WBC # BLD AUTO: 16.2 THOU/MM3 (ref 4.8–10.8)
WBC #/AREA URNS HPF: ABNORMAL /HPF
WBC #/AREA URNS HPF: NEGATIVE /[HPF]
YEAST LIKE FUNGI URNS QL MICRO: ABNORMAL

## 2023-09-09 PROCEDURE — 93005 ELECTROCARDIOGRAM TRACING: CPT | Performed by: EMERGENCY MEDICINE

## 2023-09-09 PROCEDURE — 2580000003 HC RX 258: Performed by: EMERGENCY MEDICINE

## 2023-09-09 PROCEDURE — C9113 INJ PANTOPRAZOLE SODIUM, VIA: HCPCS | Performed by: EMERGENCY MEDICINE

## 2023-09-09 PROCEDURE — 84484 ASSAY OF TROPONIN QUANT: CPT

## 2023-09-09 PROCEDURE — 85025 COMPLETE CBC W/AUTO DIFF WBC: CPT

## 2023-09-09 PROCEDURE — 83690 ASSAY OF LIPASE: CPT

## 2023-09-09 PROCEDURE — 96361 HYDRATE IV INFUSION ADD-ON: CPT

## 2023-09-09 PROCEDURE — 6370000000 HC RX 637 (ALT 250 FOR IP): Performed by: EMERGENCY MEDICINE

## 2023-09-09 PROCEDURE — 80053 COMPREHEN METABOLIC PANEL: CPT

## 2023-09-09 PROCEDURE — 81001 URINALYSIS AUTO W/SCOPE: CPT

## 2023-09-09 PROCEDURE — 96375 TX/PRO/DX INJ NEW DRUG ADDON: CPT

## 2023-09-09 PROCEDURE — 84703 CHORIONIC GONADOTROPIN ASSAY: CPT

## 2023-09-09 PROCEDURE — 83735 ASSAY OF MAGNESIUM: CPT

## 2023-09-09 PROCEDURE — 36415 COLL VENOUS BLD VENIPUNCTURE: CPT

## 2023-09-09 PROCEDURE — 71046 X-RAY EXAM CHEST 2 VIEWS: CPT

## 2023-09-09 PROCEDURE — 93010 ELECTROCARDIOGRAM REPORT: CPT | Performed by: INTERNAL MEDICINE

## 2023-09-09 PROCEDURE — A4216 STERILE WATER/SALINE, 10 ML: HCPCS | Performed by: EMERGENCY MEDICINE

## 2023-09-09 PROCEDURE — 99285 EMERGENCY DEPT VISIT HI MDM: CPT

## 2023-09-09 PROCEDURE — 87636 SARSCOV2 & INF A&B AMP PRB: CPT

## 2023-09-09 PROCEDURE — 6360000002 HC RX W HCPCS: Performed by: EMERGENCY MEDICINE

## 2023-09-09 PROCEDURE — 96374 THER/PROPH/DIAG INJ IV PUSH: CPT

## 2023-09-09 RX ORDER — 0.9 % SODIUM CHLORIDE 0.9 %
1000 INTRAVENOUS SOLUTION INTRAVENOUS ONCE
Status: COMPLETED | OUTPATIENT
Start: 2023-09-09 | End: 2023-09-09

## 2023-09-09 RX ORDER — FENTANYL CITRATE 50 UG/ML
50 INJECTION, SOLUTION INTRAMUSCULAR; INTRAVENOUS ONCE
Status: COMPLETED | OUTPATIENT
Start: 2023-09-09 | End: 2023-09-09

## 2023-09-09 RX ADMIN — SODIUM CHLORIDE 1000 ML: 9 INJECTION, SOLUTION INTRAVENOUS at 03:13

## 2023-09-09 RX ADMIN — Medication: at 03:16

## 2023-09-09 RX ADMIN — SODIUM CHLORIDE 40 MG: 9 INJECTION INTRAMUSCULAR; INTRAVENOUS; SUBCUTANEOUS at 04:16

## 2023-09-09 RX ADMIN — FENTANYL CITRATE 50 MCG: 50 INJECTION, SOLUTION INTRAMUSCULAR; INTRAVENOUS at 03:18

## 2023-09-09 ASSESSMENT — PAIN SCALES - GENERAL
PAINLEVEL_OUTOF10: 8
PAINLEVEL_OUTOF10: 2

## 2023-09-09 ASSESSMENT — PAIN DESCRIPTION - LOCATION: LOCATION: ABDOMEN

## 2023-09-09 ASSESSMENT — PAIN - FUNCTIONAL ASSESSMENT: PAIN_FUNCTIONAL_ASSESSMENT: 0-10

## 2023-09-09 NOTE — DISCHARGE INSTRUCTIONS
You were seen at [facility] emergency department for lightheadedness, as well as chronic abdominal pain. Our work-up did not identify any life-threatening causes of your lightheadedness and abdominal pain. It would be very important that you follow-up with your primary doctor and schedule an endoscopy to evaluate your possible peptic ulcer disease. You should also continue taking your prescribed Protonix as directed.

## 2023-09-09 NOTE — ED NOTES
ED nurse-to-nurse bedside report    Chief Complaint   Patient presents with    Abdominal Pain    Diarrhea      LOC: alert and orientated to name, place, date  Vital signs   Vitals:    09/09/23 0246 09/09/23 0248   BP: 128/76    Pulse:  94   Resp: 14    SpO2: 92%       Pain:    Pain Interventions: fentanyl gi cocktail  Pain Goal: 2  Oxygen: No    Current needs required room air   Telemetry: No  LDAs:   Peripheral IV 09/09/23 Left Antecubital (Active)   Site Assessment Clean, dry & intact 09/09/23 0308     Continuous Infusions:   Mobility: Independent  Mendez Fall Risk Score: No flowsheet data found.   Fall Interventions: side rails call light  Report given to: January Leyva RN  09/09/23 1972
Pt resting in bed with family at bedside, no distress noted as pt breaths easy and unlabored. Pt states feeling \"better\" at this time after previous meds given. Call light in reach.        [place], [place]  09/09/23 4242
5

## 2023-09-09 NOTE — H&P
Bren Membreno is a 25year-old female who presents for an evaluation of abdominal pain. Patient has had chronic constipation since her cholecystectomy in 2021. Stool softener only provides mild relief. Patient recently uses Castor oil for her constipation which she rubs it on her belly button and chest on Monday. Also noted some bright red blood on her stool. Patient has been taking Naproxen for the past two weeks for abdominal pain and back pain. Patient develops dark stool today. Patient states that her main concern today is chest tightness and feeling like passing out which she attributes to [employer] use. Patient is worried that she will go to sleep and not waking up which prompts her to come to the ED. Her current symptoms include dizziness, LUQ sharp pain which is constant in nature. Patient also states that she cannot fully empty her bladder and feels like she has a UTI. Patient endorses recent increase in Clozapine.

## 2023-09-10 LAB
EKG ATRIAL RATE: 95 BPM
EKG P AXIS: 31 DEGREES
EKG P-R INTERVAL: 136 MS
EKG Q-T INTERVAL: 360 MS
EKG QRS DURATION: 90 MS
EKG QTC CALCULATION (BAZETT): 452 MS
EKG R AXIS: 16 DEGREES
EKG T AXIS: 32 DEGREES
EKG VENTRICULAR RATE: 95 BPM

## 2023-09-15 ENCOUNTER — APPOINTMENT (OUTPATIENT)
Dept: CT IMAGING | Age: 28
End: 2023-09-15
Payer: MEDICAID

## 2023-09-15 ENCOUNTER — HOSPITAL ENCOUNTER (EMERGENCY)
Age: 28
Discharge: HOME | End: 2023-09-15
Payer: MEDICAID

## 2023-09-15 VITALS
TEMPERATURE: 97.6 F | HEART RATE: 98 BPM | WEIGHT: 293 LBS | OXYGEN SATURATION: 97 % | SYSTOLIC BLOOD PRESSURE: 131 MMHG | DIASTOLIC BLOOD PRESSURE: 75 MMHG | RESPIRATION RATE: 16 BRPM | HEIGHT: 63 IN | BODY MASS INDEX: 51.91 KG/M2

## 2023-09-15 DIAGNOSIS — R42 LIGHTHEADED: ICD-10-CM

## 2023-09-15 DIAGNOSIS — R11.2 NAUSEA AND VOMITING, UNSPECIFIED VOMITING TYPE: Primary | ICD-10-CM

## 2023-09-15 DIAGNOSIS — R10.10 PAIN OF UPPER ABDOMEN: ICD-10-CM

## 2023-09-15 LAB
ALBUMIN SERPL BCG-MCNC: 4 G/DL (ref 3.5–5.1)
ALP SERPL-CCNC: 108 U/L (ref 38–126)
ALT SERPL W/O P-5'-P-CCNC: 23 U/L (ref 11–66)
ANION GAP SERPL CALC-SCNC: 11 MEQ/L (ref 8–16)
AST SERPL-CCNC: 15 U/L (ref 5–40)
BASOPHILS ABSOLUTE: 0.1 THOU/MM3 (ref 0–0.1)
BASOPHILS NFR BLD AUTO: 0.4 %
BILIRUB CONJ SERPL-MCNC: < 0.2 MG/DL (ref 0–0.3)
BILIRUB SERPL-MCNC: < 0.2 MG/DL (ref 0.3–1.2)
BILIRUB UR QL STRIP.AUTO: NEGATIVE
BUN SERPL-MCNC: 10 MG/DL (ref 7–22)
CALCIUM SERPL-MCNC: 9.5 MG/DL (ref 8.5–10.5)
CHARACTER UR: CLEAR
CHLORIDE SERPL-SCNC: 104 MEQ/L (ref 98–111)
CO2 SERPL-SCNC: 23 MEQ/L (ref 23–33)
COLOR: YELLOW
CREAT SERPL-MCNC: 0.8 MG/DL (ref 0.4–1.2)
DEPRECATED RDW RBC AUTO: 48.5 FL (ref 35–45)
EKG ATRIAL RATE: 92 BPM
EKG P AXIS: 29 DEGREES
EKG P-R INTERVAL: 134 MS
EKG Q-T INTERVAL: 344 MS
EKG QRS DURATION: 88 MS
EKG QTC CALCULATION (BAZETT): 425 MS
EKG R AXIS: 22 DEGREES
EKG T AXIS: 41 DEGREES
EKG VENTRICULAR RATE: 92 BPM
EOSINOPHIL NFR BLD AUTO: 0.2 %
EOSINOPHILS ABSOLUTE: 0 THOU/MM3 (ref 0–0.4)
ERYTHROCYTE [DISTWIDTH] IN BLOOD BY AUTOMATED COUNT: 14.3 % (ref 11.5–14.5)
GFR SERPL CREATININE-BSD FRML MDRD: > 60 ML/MIN/1.73M2
GLUCOSE SERPL-MCNC: 109 MG/DL (ref 70–108)
GLUCOSE UR QL STRIP.AUTO: NEGATIVE MG/DL
HCT VFR BLD AUTO: 41.8 % (ref 37–47)
HGB BLD-MCNC: 13 GM/DL (ref 12–16)
HGB UR QL STRIP.AUTO: NEGATIVE
IMM GRANULOCYTES # BLD AUTO: 0.13 THOU/MM3 (ref 0–0.07)
IMM GRANULOCYTES NFR BLD AUTO: 0.8 %
KETONES UR QL STRIP.AUTO: ABNORMAL
LIPASE SERPL-CCNC: 57.3 U/L (ref 5.6–51.3)
LYMPHOCYTES ABSOLUTE: 3.3 THOU/MM3 (ref 1–4.8)
LYMPHOCYTES NFR BLD AUTO: 19.5 %
MCH RBC QN AUTO: 29 PG (ref 26–33)
MCHC RBC AUTO-ENTMCNC: 31.1 GM/DL (ref 32.2–35.5)
MCV RBC AUTO: 93.1 FL (ref 81–99)
MONOCYTES ABSOLUTE: 1 THOU/MM3 (ref 0.4–1.3)
MONOCYTES NFR BLD AUTO: 5.8 %
NEUTROPHILS NFR BLD AUTO: 73.3 %
NITRITE UR QL STRIP: NEGATIVE
NRBC BLD AUTO-RTO: 0 /100 WBC
OSMOLALITY SERPL CALC.SUM OF ELEC: 275.3 MOSMOL/KG (ref 275–300)
PH UR STRIP.AUTO: 6 [PH] (ref 5–9)
PLATELET # BLD AUTO: 344 THOU/MM3 (ref 130–400)
PMV BLD AUTO: 10 FL (ref 9.4–12.4)
POTASSIUM SERPL-SCNC: 4.4 MEQ/L (ref 3.5–5.2)
PROT SERPL-MCNC: 6.9 G/DL (ref 6.1–8)
PROT UR STRIP.AUTO-MCNC: NEGATIVE MG/DL
RBC # BLD AUTO: 4.49 MILL/MM3 (ref 4.2–5.4)
SEGMENTED NEUTROPHILS ABSOLUTE COUNT: 12.5 THOU/MM3 (ref 1.8–7.7)
SODIUM SERPL-SCNC: 138 MEQ/L (ref 135–145)
SP GR UR REFRACT.AUTO: 1.03 (ref 1–1.03)
TROPONIN, HIGH SENSITIVITY: < 6 NG/L (ref 0–12)
UROBILINOGEN, URINE: 1 EU/DL (ref 0–1)
WBC # BLD AUTO: 17.1 THOU/MM3 (ref 4.8–10.8)
WBC #/AREA URNS HPF: NEGATIVE /[HPF]

## 2023-09-15 PROCEDURE — 96375 TX/PRO/DX INJ NEW DRUG ADDON: CPT

## 2023-09-15 PROCEDURE — 93010 ELECTROCARDIOGRAM REPORT: CPT | Performed by: INTERNAL MEDICINE

## 2023-09-15 PROCEDURE — 6360000002 HC RX W HCPCS: Performed by: NURSE PRACTITIONER

## 2023-09-15 PROCEDURE — 74177 CT ABD & PELVIS W/CONTRAST: CPT

## 2023-09-15 PROCEDURE — 81003 URINALYSIS AUTO W/O SCOPE: CPT

## 2023-09-15 PROCEDURE — 93005 ELECTROCARDIOGRAM TRACING: CPT | Performed by: NURSE PRACTITIONER

## 2023-09-15 PROCEDURE — 96374 THER/PROPH/DIAG INJ IV PUSH: CPT

## 2023-09-15 PROCEDURE — 84484 ASSAY OF TROPONIN QUANT: CPT

## 2023-09-15 PROCEDURE — 99285 EMERGENCY DEPT VISIT HI MDM: CPT

## 2023-09-15 PROCEDURE — 36415 COLL VENOUS BLD VENIPUNCTURE: CPT

## 2023-09-15 PROCEDURE — 82248 BILIRUBIN DIRECT: CPT

## 2023-09-15 PROCEDURE — 85025 COMPLETE CBC W/AUTO DIFF WBC: CPT

## 2023-09-15 PROCEDURE — 83690 ASSAY OF LIPASE: CPT

## 2023-09-15 PROCEDURE — 6360000004 HC RX CONTRAST MEDICATION: Performed by: NURSE PRACTITIONER

## 2023-09-15 PROCEDURE — 80053 COMPREHEN METABOLIC PANEL: CPT

## 2023-09-15 RX ORDER — PROCHLORPERAZINE EDISYLATE 5 MG/ML
10 INJECTION INTRAMUSCULAR; INTRAVENOUS ONCE
Status: COMPLETED | OUTPATIENT
Start: 2023-09-15 | End: 2023-09-15

## 2023-09-15 RX ORDER — PROCHLORPERAZINE MALEATE 10 MG
10 TABLET ORAL EVERY 6 HOURS PRN
Qty: 12 TABLET | Refills: 0 | Status: SHIPPED | OUTPATIENT
Start: 2023-09-15

## 2023-09-15 RX ORDER — DIPHENHYDRAMINE HYDROCHLORIDE 50 MG/ML
25 INJECTION INTRAMUSCULAR; INTRAVENOUS ONCE
Status: COMPLETED | OUTPATIENT
Start: 2023-09-15 | End: 2023-09-15

## 2023-09-15 RX ADMIN — IOPAMIDOL 80 ML: 755 INJECTION, SOLUTION INTRAVENOUS at 04:59

## 2023-09-15 RX ADMIN — DIPHENHYDRAMINE HYDROCHLORIDE 25 MG: 50 INJECTION INTRAMUSCULAR; INTRAVENOUS at 02:27

## 2023-09-15 RX ADMIN — PROCHLORPERAZINE EDISYLATE 10 MG: 5 INJECTION INTRAMUSCULAR; INTRAVENOUS at 02:26

## 2023-09-15 NOTE — ED NOTES
Pt resting on cot with eyes closed at this time. Respirations even and unlabored. Pt family updated on imaging status. No other needs expressed.       Mg Gomez RN  09/15/23 8548

## 2023-09-15 NOTE — ED TRIAGE NOTES
Pt to the ED via self ambulatory to the room with complaints of emesis x1 that occurred around 1800. Pt states that she took a zofran at home around 1600. Pt has not vomited since.

## 2023-09-15 NOTE — ED NOTES
Pt resting on cot comfortably with family at bedside. No needs expressed.      Billy Fernandes RN  09/15/23 8788

## 2023-09-19 ENCOUNTER — HOSPITAL ENCOUNTER (OUTPATIENT)
Age: 28
Setting detail: OBSERVATION
Discharge: HOME | End: 2023-09-23
Attending: EMERGENCY MEDICINE
Payer: MEDICAID

## 2023-09-19 ENCOUNTER — HOSPITAL ENCOUNTER (EMERGENCY)
Age: 28
Discharge: HOME | End: 2023-09-19
Payer: MEDICAID

## 2023-09-19 ENCOUNTER — APPOINTMENT (OUTPATIENT)
Dept: GENERAL RADIOLOGY | Age: 28
End: 2023-09-19
Payer: MEDICAID

## 2023-09-19 VITALS
TEMPERATURE: 98.4 F | WEIGHT: 293 LBS | RESPIRATION RATE: 20 BRPM | HEART RATE: 111 BPM | SYSTOLIC BLOOD PRESSURE: 116 MMHG | BODY MASS INDEX: 51.91 KG/M2 | OXYGEN SATURATION: 95 % | DIASTOLIC BLOOD PRESSURE: 68 MMHG | HEIGHT: 63 IN

## 2023-09-19 DIAGNOSIS — J96.01 ACUTE RESPIRATORY FAILURE WITH HYPOXIA (HCC): Primary | ICD-10-CM

## 2023-09-19 DIAGNOSIS — J06.9 UPPER RESPIRATORY TRACT INFECTION, UNSPECIFIED TYPE: Primary | ICD-10-CM

## 2023-09-19 LAB
BASOPHILS ABSOLUTE: 0 THOU/MM3 (ref 0–0.1)
BASOPHILS NFR BLD AUTO: 0.3 %
DEPRECATED RDW RBC AUTO: 49.1 FL (ref 35–45)
EOSINOPHIL NFR BLD AUTO: 0.3 %
EOSINOPHILS ABSOLUTE: 0 THOU/MM3 (ref 0–0.4)
ERYTHROCYTE [DISTWIDTH] IN BLOOD BY AUTOMATED COUNT: 14.3 % (ref 11.5–14.5)
HCT VFR BLD AUTO: 39 % (ref 37–47)
HGB BLD-MCNC: 12.3 GM/DL (ref 12–16)
IMM GRANULOCYTES # BLD AUTO: 0.08 THOU/MM3 (ref 0–0.07)
IMM GRANULOCYTES NFR BLD AUTO: 0.6 %
LYMPHOCYTES ABSOLUTE: 3.5 THOU/MM3 (ref 1–4.8)
LYMPHOCYTES NFR BLD AUTO: 25.4 %
MCH RBC QN AUTO: 29.4 PG (ref 26–33)
MCHC RBC AUTO-ENTMCNC: 31.5 GM/DL (ref 32.2–35.5)
MCV RBC AUTO: 93.3 FL (ref 81–99)
MONOCYTES ABSOLUTE: 0.8 THOU/MM3 (ref 0.4–1.3)
MONOCYTES NFR BLD AUTO: 5.8 %
NEUTROPHILS NFR BLD AUTO: 67.6 %
NRBC BLD AUTO-RTO: 0 /100 WBC
PLATELET # BLD AUTO: 312 THOU/MM3 (ref 130–400)
PMV BLD AUTO: 9.8 FL (ref 9.4–12.4)
RBC # BLD AUTO: 4.18 MILL/MM3 (ref 4.2–5.4)
SARS-COV-2 RDRP RESP QL NAA+PROBE: NOT  DETECTED
SEGMENTED NEUTROPHILS ABSOLUTE COUNT: 9.3 THOU/MM3 (ref 1.8–7.7)
WBC # BLD AUTO: 13.7 THOU/MM3 (ref 4.8–10.8)

## 2023-09-19 PROCEDURE — 96375 TX/PRO/DX INJ NEW DRUG ADDON: CPT

## 2023-09-19 PROCEDURE — 84484 ASSAY OF TROPONIN QUANT: CPT

## 2023-09-19 PROCEDURE — 87636 SARSCOV2 & INF A&B AMP PRB: CPT

## 2023-09-19 PROCEDURE — 36415 COLL VENOUS BLD VENIPUNCTURE: CPT

## 2023-09-19 PROCEDURE — 93005 ELECTROCARDIOGRAM TRACING: CPT | Performed by: EMERGENCY MEDICINE

## 2023-09-19 PROCEDURE — 80048 BASIC METABOLIC PNL TOTAL CA: CPT

## 2023-09-19 PROCEDURE — 71045 X-RAY EXAM CHEST 1 VIEW: CPT

## 2023-09-19 PROCEDURE — 85025 COMPLETE CBC W/AUTO DIFF WBC: CPT

## 2023-09-19 PROCEDURE — 99213 OFFICE O/P EST LOW 20 MIN: CPT | Performed by: NURSE PRACTITIONER

## 2023-09-19 PROCEDURE — 87635 SARS-COV-2 COVID-19 AMP PRB: CPT

## 2023-09-19 PROCEDURE — 96374 THER/PROPH/DIAG INJ IV PUSH: CPT

## 2023-09-19 PROCEDURE — 99285 EMERGENCY DEPT VISIT HI MDM: CPT

## 2023-09-19 PROCEDURE — 83880 ASSAY OF NATRIURETIC PEPTIDE: CPT

## 2023-09-19 PROCEDURE — 84703 CHORIONIC GONADOTROPIN ASSAY: CPT

## 2023-09-19 RX ORDER — LORATADINE 10 MG
1 CAPSULE ORAL EVERY 6 HOURS PRN
Qty: 60 CAPSULE | Refills: 0 | Status: SHIPPED | OUTPATIENT
Start: 2023-09-19

## 2023-09-19 RX ORDER — BENZONATATE 200 MG/1
200 CAPSULE ORAL 3 TIMES DAILY PRN
Qty: 21 CAPSULE | Refills: 0 | Status: SHIPPED | OUTPATIENT
Start: 2023-09-19 | End: 2023-09-26

## 2023-09-19 ASSESSMENT — PAIN SCALES - GENERAL: PAINLEVEL_OUTOF10: 6

## 2023-09-19 ASSESSMENT — PAIN DESCRIPTION - FREQUENCY: FREQUENCY: INTERMITTENT

## 2023-09-19 ASSESSMENT — PAIN DESCRIPTION - ONSET: ONSET: ON-GOING

## 2023-09-19 ASSESSMENT — PAIN - FUNCTIONAL ASSESSMENT
PAIN_FUNCTIONAL_ASSESSMENT: PREVENTS OR INTERFERES SOME ACTIVE ACTIVITIES AND ADLS
PAIN_FUNCTIONAL_ASSESSMENT: 0-10

## 2023-09-19 ASSESSMENT — PAIN DESCRIPTION - PAIN TYPE: TYPE: ACUTE PAIN

## 2023-09-19 ASSESSMENT — PAIN DESCRIPTION - DESCRIPTORS: DESCRIPTORS: ACHING

## 2023-09-19 ASSESSMENT — PAIN DESCRIPTION - LOCATION: LOCATION: HEAD

## 2023-09-19 NOTE — ED TRIAGE NOTES
Pt to urgent care due to sinus drainage and PND that is causing her to have a sore throat. She also has a headache and is requesting to be tested for COVID.

## 2023-09-20 ENCOUNTER — APPOINTMENT (OUTPATIENT)
Dept: CT IMAGING | Age: 28
End: 2023-09-20
Payer: MEDICAID

## 2023-09-20 PROBLEM — J96.01 ACUTE HYPOXEMIC RESPIRATORY FAILURE (HCC): Status: ACTIVE | Noted: 2023-09-20

## 2023-09-20 LAB
ANION GAP SERPL CALC-SCNC: 10 MEQ/L (ref 8–16)
B-HCG SERPL QL: NEGATIVE
BUN SERPL-MCNC: 10 MG/DL (ref 7–22)
CALCIUM SERPL-MCNC: 9.1 MG/DL (ref 8.5–10.5)
CHLORIDE SERPL-SCNC: 106 MEQ/L (ref 98–111)
CO2 SERPL-SCNC: 24 MEQ/L (ref 23–33)
CREAT SERPL-MCNC: 0.8 MG/DL (ref 0.4–1.2)
D DIMER PPP IA.FEU-MCNC: 289 NG/ML FEU (ref 0–500)
EKG ATRIAL RATE: 92 BPM
EKG ATRIAL RATE: 97 BPM
EKG P AXIS: 29 DEGREES
EKG P AXIS: 51 DEGREES
EKG P-R INTERVAL: 134 MS
EKG P-R INTERVAL: 144 MS
EKG Q-T INTERVAL: 344 MS
EKG Q-T INTERVAL: 364 MS
EKG QRS DURATION: 88 MS
EKG QRS DURATION: 90 MS
EKG QTC CALCULATION (BAZETT): 425 MS
EKG QTC CALCULATION (BAZETT): 462 MS
EKG R AXIS: 18 DEGREES
EKG R AXIS: 22 DEGREES
EKG T AXIS: 41 DEGREES
EKG T AXIS: 52 DEGREES
EKG VENTRICULAR RATE: 92 BPM
EKG VENTRICULAR RATE: 97 BPM
FLUAV RNA RESP QL NAA+PROBE: NOT DETECTED
FLUBV RNA RESP QL NAA+PROBE: NOT DETECTED
GFR SERPL CREATININE-BSD FRML MDRD: > 60 ML/MIN/1.73M2
GLUCOSE BLD STRIP.AUTO-MCNC: 163 MG/DL (ref 70–108)
GLUCOSE BLD STRIP.AUTO-MCNC: 195 MG/DL (ref 70–108)
GLUCOSE BLD STRIP.AUTO-MCNC: 197 MG/DL (ref 70–108)
GLUCOSE BLD STRIP.AUTO-MCNC: 202 MG/DL (ref 70–108)
GLUCOSE BLD STRIP.AUTO-MCNC: 398 MG/DL (ref 70–108)
GLUCOSE SERPL-MCNC: 128 MG/DL (ref 70–108)
NT-PROBNP SERPL IA-MCNC: 65.3 PG/ML (ref 0–124)
OSMOLALITY SERPL CALC.SUM OF ELEC: 280.1 MOSMOL/KG (ref 275–300)
POTASSIUM SERPL-SCNC: 4.2 MEQ/L (ref 3.5–5.2)
S PYO AG THROAT QL: NEGATIVE
S PYO THROAT QL CULT: NORMAL
SARS-COV-2 RNA RESP QL NAA+PROBE: NOT DETECTED
SODIUM SERPL-SCNC: 140 MEQ/L (ref 135–145)
TROPONIN, HIGH SENSITIVITY: < 6 NG/L (ref 0–12)

## 2023-09-20 PROCEDURE — G0378 HOSPITAL OBSERVATION PER HR: HCPCS

## 2023-09-20 PROCEDURE — 96367 TX/PROPH/DG ADDL SEQ IV INF: CPT

## 2023-09-20 PROCEDURE — 6370000000 HC RX 637 (ALT 250 FOR IP): Performed by: PHYSICIAN ASSISTANT

## 2023-09-20 PROCEDURE — 71275 CT ANGIOGRAPHY CHEST: CPT

## 2023-09-20 PROCEDURE — 6370000000 HC RX 637 (ALT 250 FOR IP): Performed by: EMERGENCY MEDICINE

## 2023-09-20 PROCEDURE — 2580000003 HC RX 258: Performed by: EMERGENCY MEDICINE

## 2023-09-20 PROCEDURE — 87070 CULTURE OTHR SPECIMN AEROBIC: CPT

## 2023-09-20 PROCEDURE — 85379 FIBRIN DEGRADATION QUANT: CPT

## 2023-09-20 PROCEDURE — 2580000003 HC RX 258: Performed by: PHYSICIAN ASSISTANT

## 2023-09-20 PROCEDURE — 6360000004 HC RX CONTRAST MEDICATION: Performed by: PHYSICIAN ASSISTANT

## 2023-09-20 PROCEDURE — 6360000002 HC RX W HCPCS: Performed by: PHYSICIAN ASSISTANT

## 2023-09-20 PROCEDURE — 94640 AIRWAY INHALATION TREATMENT: CPT

## 2023-09-20 PROCEDURE — 2580000003 HC RX 258: Performed by: HOSPITALIST

## 2023-09-20 PROCEDURE — 6370000000 HC RX 637 (ALT 250 FOR IP): Performed by: HOSPITALIST

## 2023-09-20 PROCEDURE — 99223 1ST HOSP IP/OBS HIGH 75: CPT | Performed by: PHYSICIAN ASSISTANT

## 2023-09-20 PROCEDURE — 94669 MECHANICAL CHEST WALL OSCILL: CPT

## 2023-09-20 PROCEDURE — 96365 THER/PROPH/DIAG IV INF INIT: CPT

## 2023-09-20 PROCEDURE — 93010 ELECTROCARDIOGRAM REPORT: CPT | Performed by: NUCLEAR MEDICINE

## 2023-09-20 PROCEDURE — 82948 REAGENT STRIP/BLOOD GLUCOSE: CPT

## 2023-09-20 PROCEDURE — 2700000000 HC OXYGEN THERAPY PER DAY

## 2023-09-20 PROCEDURE — 6360000002 HC RX W HCPCS: Performed by: EMERGENCY MEDICINE

## 2023-09-20 PROCEDURE — 6360000002 HC RX W HCPCS: Performed by: HOSPITALIST

## 2023-09-20 PROCEDURE — 94761 N-INVAS EAR/PLS OXIMETRY MLT: CPT

## 2023-09-20 PROCEDURE — 96375 TX/PRO/DX INJ NEW DRUG ADDON: CPT

## 2023-09-20 PROCEDURE — 87880 STREP A ASSAY W/OPTIC: CPT

## 2023-09-20 RX ORDER — PANTOPRAZOLE SODIUM 40 MG/1
40 TABLET, DELAYED RELEASE ORAL
Status: DISCONTINUED | OUTPATIENT
Start: 2023-09-20 | End: 2023-09-23 | Stop reason: HOSPADM

## 2023-09-20 RX ORDER — MAGNESIUM SULFATE IN WATER 40 MG/ML
2000 INJECTION, SOLUTION INTRAVENOUS PRN
Status: DISCONTINUED | OUTPATIENT
Start: 2023-09-20 | End: 2023-09-23 | Stop reason: HOSPADM

## 2023-09-20 RX ORDER — ACETAMINOPHEN 325 MG/1
650 TABLET ORAL EVERY 6 HOURS PRN
Status: DISCONTINUED | OUTPATIENT
Start: 2023-09-20 | End: 2023-09-23 | Stop reason: HOSPADM

## 2023-09-20 RX ORDER — GUAIFENESIN 600 MG/1
600 TABLET, EXTENDED RELEASE ORAL 2 TIMES DAILY
Status: DISCONTINUED | OUTPATIENT
Start: 2023-09-20 | End: 2023-09-23 | Stop reason: HOSPADM

## 2023-09-20 RX ORDER — ONDANSETRON 4 MG/1
4 TABLET, ORALLY DISINTEGRATING ORAL EVERY 8 HOURS PRN
Status: DISCONTINUED | OUTPATIENT
Start: 2023-09-20 | End: 2023-09-23 | Stop reason: HOSPADM

## 2023-09-20 RX ORDER — ACETAMINOPHEN 650 MG/1
650 SUPPOSITORY RECTAL EVERY 6 HOURS PRN
Status: DISCONTINUED | OUTPATIENT
Start: 2023-09-20 | End: 2023-09-23 | Stop reason: HOSPADM

## 2023-09-20 RX ORDER — SODIUM CHLORIDE 0.9 % (FLUSH) 0.9 %
10 SYRINGE (ML) INJECTION PRN
Status: DISCONTINUED | OUTPATIENT
Start: 2023-09-20 | End: 2023-09-23 | Stop reason: HOSPADM

## 2023-09-20 RX ORDER — IBUPROFEN 600 MG/1
1 TABLET ORAL PRN
Status: DISCONTINUED | OUTPATIENT
Start: 2023-09-20 | End: 2023-09-23 | Stop reason: HOSPADM

## 2023-09-20 RX ORDER — SODIUM CHLORIDE 9 MG/ML
INJECTION, SOLUTION INTRAVENOUS PRN
Status: DISCONTINUED | OUTPATIENT
Start: 2023-09-20 | End: 2023-09-23 | Stop reason: HOSPADM

## 2023-09-20 RX ORDER — IPRATROPIUM BROMIDE AND ALBUTEROL SULFATE 2.5; .5 MG/3ML; MG/3ML
1 SOLUTION RESPIRATORY (INHALATION)
Status: DISCONTINUED | OUTPATIENT
Start: 2023-09-20 | End: 2023-09-22

## 2023-09-20 RX ORDER — BENZONATATE 100 MG/1
200 CAPSULE ORAL 3 TIMES DAILY PRN
Status: DISCONTINUED | OUTPATIENT
Start: 2023-09-20 | End: 2023-09-23 | Stop reason: HOSPADM

## 2023-09-20 RX ORDER — DOXYCYCLINE HYCLATE 100 MG
100 TABLET ORAL EVERY 12 HOURS SCHEDULED
Status: DISCONTINUED | OUTPATIENT
Start: 2023-09-20 | End: 2023-09-20

## 2023-09-20 RX ORDER — DEXTROSE MONOHYDRATE 100 MG/ML
INJECTION, SOLUTION INTRAVENOUS CONTINUOUS PRN
Status: DISCONTINUED | OUTPATIENT
Start: 2023-09-20 | End: 2023-09-23 | Stop reason: HOSPADM

## 2023-09-20 RX ORDER — IPRATROPIUM BROMIDE AND ALBUTEROL SULFATE 2.5; .5 MG/3ML; MG/3ML
2 SOLUTION RESPIRATORY (INHALATION)
Status: DISCONTINUED | OUTPATIENT
Start: 2023-09-20 | End: 2023-09-20

## 2023-09-20 RX ORDER — SODIUM CHLORIDE 0.9 % (FLUSH) 0.9 %
10 SYRINGE (ML) INJECTION EVERY 12 HOURS SCHEDULED
Status: DISCONTINUED | OUTPATIENT
Start: 2023-09-20 | End: 2023-09-23 | Stop reason: HOSPADM

## 2023-09-20 RX ORDER — ONDANSETRON 2 MG/ML
4 INJECTION INTRAMUSCULAR; INTRAVENOUS EVERY 6 HOURS PRN
Status: DISCONTINUED | OUTPATIENT
Start: 2023-09-20 | End: 2023-09-23 | Stop reason: HOSPADM

## 2023-09-20 RX ORDER — ENOXAPARIN SODIUM 100 MG/ML
30 INJECTION SUBCUTANEOUS 2 TIMES DAILY
Status: DISCONTINUED | OUTPATIENT
Start: 2023-09-20 | End: 2023-09-23 | Stop reason: HOSPADM

## 2023-09-20 RX ORDER — ALBUTEROL SULFATE 2.5 MG/3ML
2.5 SOLUTION RESPIRATORY (INHALATION) EVERY 4 HOURS PRN
Status: DISCONTINUED | OUTPATIENT
Start: 2023-09-20 | End: 2023-09-23 | Stop reason: HOSPADM

## 2023-09-20 RX ORDER — KETOROLAC TROMETHAMINE 30 MG/ML
15 INJECTION, SOLUTION INTRAMUSCULAR; INTRAVENOUS ONCE
Status: DISCONTINUED | OUTPATIENT
Start: 2023-09-20 | End: 2023-09-23 | Stop reason: HOSPADM

## 2023-09-20 RX ORDER — POTASSIUM CHLORIDE 7.45 MG/ML
10 INJECTION INTRAVENOUS PRN
Status: DISCONTINUED | OUTPATIENT
Start: 2023-09-20 | End: 2023-09-23 | Stop reason: HOSPADM

## 2023-09-20 RX ORDER — PREDNISONE 20 MG/1
40 TABLET ORAL DAILY
Status: DISCONTINUED | OUTPATIENT
Start: 2023-09-20 | End: 2023-09-23 | Stop reason: HOSPADM

## 2023-09-20 RX ORDER — BUSPIRONE HYDROCHLORIDE 10 MG/1
10 TABLET ORAL 2 TIMES DAILY
Status: DISCONTINUED | OUTPATIENT
Start: 2023-09-20 | End: 2023-09-23 | Stop reason: HOSPADM

## 2023-09-20 RX ORDER — METOPROLOL TARTRATE 50 MG/1
50 TABLET, FILM COATED ORAL 2 TIMES DAILY
Status: DISCONTINUED | OUTPATIENT
Start: 2023-09-20 | End: 2023-09-23 | Stop reason: HOSPADM

## 2023-09-20 RX ORDER — CLONAZEPAM 0.5 MG/1
0.5 TABLET ORAL 2 TIMES DAILY PRN
Status: DISCONTINUED | OUTPATIENT
Start: 2023-09-20 | End: 2023-09-23 | Stop reason: HOSPADM

## 2023-09-20 RX ORDER — PRAZOSIN HYDROCHLORIDE 1 MG/1
2 CAPSULE ORAL NIGHTLY
Status: DISCONTINUED | OUTPATIENT
Start: 2023-09-20 | End: 2023-09-23 | Stop reason: HOSPADM

## 2023-09-20 RX ORDER — POLYETHYLENE GLYCOL 3350 17 G/17G
17 POWDER, FOR SOLUTION ORAL DAILY PRN
Status: DISCONTINUED | OUTPATIENT
Start: 2023-09-20 | End: 2023-09-23 | Stop reason: HOSPADM

## 2023-09-20 RX ORDER — IPRATROPIUM BROMIDE AND ALBUTEROL SULFATE 2.5; .5 MG/3ML; MG/3ML
1 SOLUTION RESPIRATORY (INHALATION) ONCE
Status: COMPLETED | OUTPATIENT
Start: 2023-09-20 | End: 2023-09-20

## 2023-09-20 RX ORDER — INSULIN LISPRO 100 [IU]/ML
0-4 INJECTION, SOLUTION INTRAVENOUS; SUBCUTANEOUS NIGHTLY
Status: DISCONTINUED | OUTPATIENT
Start: 2023-09-20 | End: 2023-09-23 | Stop reason: HOSPADM

## 2023-09-20 RX ORDER — CLOZAPINE 100 MG/1
400 TABLET ORAL NIGHTLY
Status: DISCONTINUED | OUTPATIENT
Start: 2023-09-20 | End: 2023-09-23 | Stop reason: HOSPADM

## 2023-09-20 RX ORDER — NICOTINE 21 MG/24HR
1 PATCH, TRANSDERMAL 24 HOURS TRANSDERMAL DAILY
Status: DISCONTINUED | OUTPATIENT
Start: 2023-09-20 | End: 2023-09-23 | Stop reason: HOSPADM

## 2023-09-20 RX ORDER — INSULIN LISPRO 100 [IU]/ML
0-4 INJECTION, SOLUTION INTRAVENOUS; SUBCUTANEOUS
Status: DISCONTINUED | OUTPATIENT
Start: 2023-09-20 | End: 2023-09-23 | Stop reason: HOSPADM

## 2023-09-20 RX ORDER — DOXEPIN HYDROCHLORIDE 50 MG/1
50 CAPSULE ORAL NIGHTLY
Status: DISCONTINUED | OUTPATIENT
Start: 2023-09-20 | End: 2023-09-23 | Stop reason: HOSPADM

## 2023-09-20 RX ORDER — MULTIVITAMIN WITH IRON
1 TABLET ORAL DAILY
Status: DISCONTINUED | OUTPATIENT
Start: 2023-09-20 | End: 2023-09-23 | Stop reason: HOSPADM

## 2023-09-20 RX ORDER — POTASSIUM CHLORIDE 20 MEQ/1
40 TABLET, EXTENDED RELEASE ORAL PRN
Status: DISCONTINUED | OUTPATIENT
Start: 2023-09-20 | End: 2023-09-23 | Stop reason: HOSPADM

## 2023-09-20 RX ADMIN — IPRATROPIUM BROMIDE AND ALBUTEROL SULFATE 1 DOSE: .5; 3 SOLUTION RESPIRATORY (INHALATION) at 12:29

## 2023-09-20 RX ADMIN — DOXEPIN HYDROCHLORIDE 50 MG: 50 CAPSULE ORAL at 20:34

## 2023-09-20 RX ADMIN — BENZONATATE 200 MG: 100 CAPSULE ORAL at 20:33

## 2023-09-20 RX ADMIN — GUAIFENESIN 600 MG: 600 TABLET, EXTENDED RELEASE ORAL at 09:30

## 2023-09-20 RX ADMIN — IOPAMIDOL 80 ML: 755 INJECTION, SOLUTION INTRAVENOUS at 05:34

## 2023-09-20 RX ADMIN — CLONAZEPAM 0.5 MG: 0.5 TABLET ORAL at 11:35

## 2023-09-20 RX ADMIN — IPRATROPIUM BROMIDE AND ALBUTEROL SULFATE 1 DOSE: .5; 3 SOLUTION RESPIRATORY (INHALATION) at 07:38

## 2023-09-20 RX ADMIN — IPRATROPIUM BROMIDE AND ALBUTEROL SULFATE 1 DOSE: .5; 3 SOLUTION RESPIRATORY (INHALATION) at 16:24

## 2023-09-20 RX ADMIN — CLONAZEPAM 0.5 MG: 0.5 TABLET ORAL at 20:32

## 2023-09-20 RX ADMIN — PREDNISONE 40 MG: 20 TABLET ORAL at 10:33

## 2023-09-20 RX ADMIN — PRAZOSIN HYDROCHLORIDE 2 MG: 1 CAPSULE ORAL at 20:33

## 2023-09-20 RX ADMIN — BUSPIRONE HYDROCHLORIDE 10 MG: 10 TABLET ORAL at 20:34

## 2023-09-20 RX ADMIN — INSULIN LISPRO 4 UNITS: 100 INJECTION, SOLUTION INTRAVENOUS; SUBCUTANEOUS at 20:28

## 2023-09-20 RX ADMIN — DOXYCYCLINE HYCLATE 100 MG: 100 TABLET, COATED ORAL at 09:30

## 2023-09-20 RX ADMIN — METOPROLOL TARTRATE 50 MG: 50 TABLET, FILM COATED ORAL at 11:32

## 2023-09-20 RX ADMIN — IPRATROPIUM BROMIDE AND ALBUTEROL SULFATE 1 DOSE: .5; 3 SOLUTION RESPIRATORY (INHALATION) at 21:01

## 2023-09-20 RX ADMIN — IPRATROPIUM BROMIDE AND ALBUTEROL SULFATE 1 DOSE: .5; 3 SOLUTION RESPIRATORY (INHALATION) at 00:49

## 2023-09-20 RX ADMIN — BUSPIRONE HYDROCHLORIDE 10 MG: 10 TABLET ORAL at 11:32

## 2023-09-20 RX ADMIN — CEFTRIAXONE SODIUM 1000 MG: 1 INJECTION, POWDER, FOR SOLUTION INTRAMUSCULAR; INTRAVENOUS at 13:10

## 2023-09-20 RX ADMIN — ONDANSETRON 4 MG: 2 INJECTION INTRAMUSCULAR; INTRAVENOUS at 15:57

## 2023-09-20 RX ADMIN — AZITHROMYCIN MONOHYDRATE 500 MG: 500 INJECTION, POWDER, LYOPHILIZED, FOR SOLUTION INTRAVENOUS at 13:51

## 2023-09-20 RX ADMIN — IPRATROPIUM BROMIDE AND ALBUTEROL SULFATE 1 DOSE: .5; 3 SOLUTION RESPIRATORY (INHALATION) at 06:13

## 2023-09-20 RX ADMIN — WATER 80 MG: 1 INJECTION INTRAMUSCULAR; INTRAVENOUS; SUBCUTANEOUS at 04:28

## 2023-09-20 RX ADMIN — ACETAMINOPHEN 650 MG: 325 TABLET ORAL at 13:19

## 2023-09-20 RX ADMIN — METOPROLOL TARTRATE 50 MG: 50 TABLET, FILM COATED ORAL at 20:33

## 2023-09-20 RX ADMIN — GUAIFENESIN 600 MG: 600 TABLET, EXTENDED RELEASE ORAL at 20:34

## 2023-09-20 RX ADMIN — BENZONATATE 200 MG: 100 CAPSULE ORAL at 11:32

## 2023-09-20 RX ADMIN — CLOZAPINE 400 MG: 100 TABLET ORAL at 20:32

## 2023-09-20 RX ADMIN — SODIUM CHLORIDE, PRESERVATIVE FREE 10 ML: 5 INJECTION INTRAVENOUS at 20:34

## 2023-09-20 RX ADMIN — SODIUM CHLORIDE, PRESERVATIVE FREE 10 ML: 5 INJECTION INTRAVENOUS at 09:31

## 2023-09-20 RX ADMIN — Medication 1 TABLET: at 11:32

## 2023-09-20 ASSESSMENT — PAIN DESCRIPTION - ORIENTATION
ORIENTATION: RIGHT;LEFT;LOWER
ORIENTATION: RIGHT;LEFT;LOWER

## 2023-09-20 ASSESSMENT — PAIN DESCRIPTION - LOCATION
LOCATION: BACK
LOCATION: BACK

## 2023-09-20 ASSESSMENT — PAIN - FUNCTIONAL ASSESSMENT: PAIN_FUNCTIONAL_ASSESSMENT: ACTIVITIES ARE NOT PREVENTED

## 2023-09-20 ASSESSMENT — PAIN SCALES - GENERAL
PAINLEVEL_OUTOF10: 1
PAINLEVEL_OUTOF10: 4
PAINLEVEL_OUTOF10: 3

## 2023-09-20 ASSESSMENT — PAIN DESCRIPTION - DESCRIPTORS
DESCRIPTORS: DISCOMFORT
DESCRIPTORS: STABBING

## 2023-09-20 NOTE — ED PROVIDER NOTES
administration in time range)   acetaminophen (TYLENOL) tablet 650 mg (has no administration in time range)     Or   acetaminophen (TYLENOL) suppository 650 mg (has no administration in time range)   potassium chloride (KLOR-CON M) extended release tablet 40 mEq (has no administration in time range)     Or   potassium bicarb-citric acid (EFFER-K) effervescent tablet 40 mEq (has no administration in time range)     Or   potassium chloride 10 mEq/100 mL IVPB (Peripheral Line) (has no administration in time range)   magnesium sulfate 2000 mg in 50 mL IVPB premix (has no administration in time range)   ipratropium 0.5 mg-albuterol 2.5 mg (DUONEB) nebulizer solution 1 Dose (has no administration in time range)   predniSONE (DELTASONE) tablet 40 mg (has no administration in time range)   doxycycline hyclate (VIBRA-TABS) tablet 100 mg (has no administration in time range)   benzonatate (TESSALON) capsule 200 mg (has no administration in time range)   busPIRone (BUSPAR) tablet 10 mg (has no administration in time range)   clonazePAM (KLONOPIN) tablet 0.5 mg (has no administration in time range)   cloZAPine (CLOZARIL) tablet 125 mg (has no administration in time range)   doxepin (SINEQUAN) capsule 50 mg (has no administration in time range)   metoprolol tartrate (LOPRESSOR) tablet 50 mg (has no administration in time range)   therapeutic multivitamin-minerals 1 tablet (has no administration in time range)   pantoprazole (PROTONIX) tablet 40 mg (has no administration in time range)   prazosin (MINIPRESS) capsule 2 mg (has no administration in time range)   ketorolac (TORADOL) injection 15 mg (has no administration in time range)   nicotine (NICODERM CQ) 21 MG/24HR 1 patch (has no administration in time range)   insulin lispro (HUMALOG) injection vial 0-4 Units (has no administration in time range)   insulin lispro (HUMALOG) injection vial 0-4 Units (has no administration in time range)   glucose chewable tablet 16 g (has no GLOMERULAR FILTRATION RATE, ESTIMATED   OSMOLALITY   D-DIMER, QUANTITATIVE       EMERGENCY DEPARTMENT COURSE:   Vitals:    Vitals:    09/20/23 0432 09/20/23 0516 09/20/23 0521 09/20/23 0614   BP:  (!) 148/81     Pulse:  (!) 101 (!) 101 (!) 103   Resp:  25 25 29   Temp:       TempSrc:       SpO2: 92% 91% 94% 92%         CRITICAL CARE:       CONSULTS:  None    PROCEDURES:  none    FINAL IMPRESSION      1. Acute respiratory failure with hypoxia ([address])          DISPOSITION/PLAN   Admitted    PATIENT REFERRED TO:  No follow-up provider specified.     DISCHARGE MEDICATIONS:  New Prescriptions    No medications on file       (Please note that portions of this note were completed with a voice recognition program.  Efforts were made to edit the dictations but occasionally words are mis-transcribed.)    Marleen Chu, [address] [place] [address],   09/20/23 4131

## 2023-09-20 NOTE — PROGRESS NOTES
Pharmacist Review and Automatic Dose Adjustment of Prophylactic Enoxaparin    Reviewed reason(s) for admission/hospital problem list    The reviewing pharmacist has made an adjustment to the ordered enoxaparin dose or converted to UFH per the approved [facility] protocol and table as identified below. Luis Walker is a 25 y.o. female. Recent Labs     09/19/23  2330   CREATININE 0.8       Estimated Creatinine Clearance: 139 mL/min (based on SCr of 0.8 mg/dL). Recent Labs     09/19/23  2330   HGB 12.3   HCT 39.0        No results for input(s): \"INR\" in the last 72 hours.     Height:   Ht Readings from Last 1 Encounters:   09/19/23 5' 3\" (1.6 m)     Weight:  Wt Readings from Last 1 Encounters:   09/19/23 300 lb (136.1 kg)               Plan: Based upon the patient's weight and renal function    Ordered: Enoxaparin 40mg SUBQ Daily    Changed/converted to    New Order: Enoxaparin 30mg SUBQ BID      Thank you,  Wendee Boas, [address] [place] [address]  9/20/2023, 4:34 AM

## 2023-09-20 NOTE — ED NOTES
Patient resting in bed. Respirations easy and unlabored. No distress noted. Call light within reach.        Yulissa Bowens RN  09/20/23 [id number]

## 2023-09-20 NOTE — ED NOTES
Upon first contact with patient this RN receives bedside shift report from Luda Ramirez RN. Patient resting in bed with eyes closed. Respirations easy and unlabored.       Kim Reese RN  09/20/23 [id number]

## 2023-09-20 NOTE — CARE COORDINATION
Case Management Assessment  Initial Evaluation    Date/Time of Evaluation: 9/20/2023 1:24 PM  Assessment Completed by: Neno Cano RN    If patient is discharged prior to next notation, then this note serves as note for discharge by case management. Patient Name: Neema Moulton                   YOB: 1995  Diagnosis: Acute respiratory failure with hypoxia ([address]) [J96.01]  Acute hypoxemic respiratory failure ([address]) [J96.01]                   Date / Time: 9/19/2023 11:23 PM  Location: [address] [place] [address]     Patient Admission Status: Observation   Readmission Risk Low 0-14, Mod 15-19), High > 20: No data recorded  Current PCP: NAMAN Lowe CNP  PCP verified by CM? Yes    Chart Reviewed: Yes      History Provided by: Patient  Patient Orientation: Alert and Oriented    Patient Cognition: Alert    Hospitalization in the last 30 days (Readmission):  No    If yes, Readmission Assessment in CM Navigator will be completed. Advance Directives:      Code Status: Full Code   Patient's Primary Decision Maker is: Legal Next of Kin      Discharge Planning:    Patient lives with: Family Members Type of Home: House  Primary Care Giver: Self  Patient Support Systems include: Family Members, /   Current Financial resources: Medicaid  Current community resources: Psychiatric Treatment (Patient is current with [facility])  Current services prior to admission: None            Current DME:              Type of Home Care services:  None    ADLS  Prior functional level: Independent in ADLs/IADLs  Current functional level: Independent in ADLs/IADLs    Family can provide assistance at DC: Yes  Would you like Case Management to discuss the discharge plan with any other family members/significant others, and if so, who?  No  Plans to Return to Present Housing: Yes  Other Identified Issues/Barriers to RETURNING to current housing: None  Potential Assistance needed at discharge:

## 2023-09-20 NOTE — PLAN OF CARE
Problem: Safety - Adult  Goal: Free from fall injury  Outcome: Progressing   Fall assessment completed. Patient using call light appropriately to call for assistance with ambulation to bathroom. Personal items within reach. Patient is also compliant with use of non-skid slippers. Problem: Chronic Conditions and Co-morbidities  Goal: Patient's chronic conditions and co-morbidity symptoms are monitored and maintained or improved  Outcome: Progressing  Flowsheets (Taken 9/20/2023 0809)  Care Plan - Patient's Chronic Conditions and Co-Morbidity Symptoms are Monitored and Maintained or Improved: Monitor and assess patient's chronic conditions and comorbid symptoms for stability, deterioration, or improvement     Care plan reviewed with patient. Patient verbalize understanding of the plan of care and contribute to goal setting.

## 2023-09-20 NOTE — ED NOTES
ED to inpatient nurses report    Chief Complaint   Patient presents with    Pharyngitis      Present to ED from home  LOC: alert and orientated to name, place, date  Vital signs   Vitals:    09/20/23 0432 09/20/23 0516 09/20/23 0521 09/20/23 0614   BP:  (!) 148/81     Pulse:  (!) 101 (!) 101 (!) 103   Resp:  25 25 29   Temp:       TempSrc:       SpO2: 92% 91% 94% 92%      Oxygen Baseline room air    Current needs required 4L NC Bipap/Cpap No  LDAs:   Peripheral IV 09/20/23 Left Antecubital (Active)   Site Assessment Clean, dry & intact 09/20/23 0122   Line Status Blood return noted;Normal saline locked; Flushed;Specimen collected 09/20/23 0122   Phlebitis Assessment No symptoms 09/20/23 0122   Infiltration Assessment 0 09/20/23 0122   Dressing Status Clean, dry & intact 09/20/23 0122     Mobility: Requires assistance * 1  Present condition: stable    Preferred Language: English     Electronically signed by Harry Wick RN on 9/20/2023 at 6:48 AM       Harry Wick RN  09/20/23 5129

## 2023-09-20 NOTE — PLAN OF CARE
Problem: Respiratory - Adult  Goal: Lung sounds clear or within normal limits for patient  Note: Patient receiving breathing treatments to maintain and manage respiratory status. Will be continued as ordered to improve aeration.

## 2023-09-20 NOTE — PLAN OF CARE
Problem: Respiratory - Adult  Goal: Clear lung sounds  Outcome: Progressing  Note: Txs to help improve lung aeration. Acapella to help clear secretions. Patient mutually agreed on goals.

## 2023-09-21 LAB
ANION GAP SERPL CALC-SCNC: 9 MEQ/L (ref 8–16)
BASOPHILS ABSOLUTE: 0.1 THOU/MM3 (ref 0–0.1)
BASOPHILS NFR BLD AUTO: 0.2 %
BUN SERPL-MCNC: 12 MG/DL (ref 7–22)
CALCIUM SERPL-MCNC: 9.2 MG/DL (ref 8.5–10.5)
CHLORIDE SERPL-SCNC: 106 MEQ/L (ref 98–111)
CO2 SERPL-SCNC: 24 MEQ/L (ref 23–33)
CREAT SERPL-MCNC: 0.6 MG/DL (ref 0.4–1.2)
DEPRECATED RDW RBC AUTO: 50.2 FL (ref 35–45)
EOSINOPHIL NFR BLD AUTO: 0 %
EOSINOPHILS ABSOLUTE: 0 THOU/MM3 (ref 0–0.4)
ERYTHROCYTE [DISTWIDTH] IN BLOOD BY AUTOMATED COUNT: 14.4 % (ref 11.5–14.5)
GFR SERPL CREATININE-BSD FRML MDRD: > 60 ML/MIN/1.73M2
GLUCOSE BLD STRIP.AUTO-MCNC: 131 MG/DL (ref 70–108)
GLUCOSE BLD STRIP.AUTO-MCNC: 131 MG/DL (ref 70–108)
GLUCOSE BLD STRIP.AUTO-MCNC: 145 MG/DL (ref 70–108)
GLUCOSE BLD STRIP.AUTO-MCNC: 206 MG/DL (ref 70–108)
GLUCOSE SERPL-MCNC: 142 MG/DL (ref 70–108)
HCT VFR BLD AUTO: 38.7 % (ref 37–47)
HGB BLD-MCNC: 12.1 GM/DL (ref 12–16)
IMM GRANULOCYTES # BLD AUTO: 0.42 THOU/MM3 (ref 0–0.07)
IMM GRANULOCYTES NFR BLD AUTO: 1.6 %
LYMPHOCYTES ABSOLUTE: 2.6 THOU/MM3 (ref 1–4.8)
LYMPHOCYTES NFR BLD AUTO: 9.8 %
MCH RBC QN AUTO: 29.6 PG (ref 26–33)
MCHC RBC AUTO-ENTMCNC: 31.3 GM/DL (ref 32.2–35.5)
MCV RBC AUTO: 94.6 FL (ref 81–99)
MONOCYTES ABSOLUTE: 1.9 THOU/MM3 (ref 0.4–1.3)
MONOCYTES NFR BLD AUTO: 6.9 %
NEUTROPHILS NFR BLD AUTO: 81.5 %
NRBC BLD AUTO-RTO: 0 /100 WBC
PLATELET # BLD AUTO: 348 THOU/MM3 (ref 130–400)
PLATELET BLD QL SMEAR: ADEQUATE
PMV BLD AUTO: 10 FL (ref 9.4–12.4)
POTASSIUM SERPL-SCNC: 4.8 MEQ/L (ref 3.5–5.2)
RBC # BLD AUTO: 4.09 MILL/MM3 (ref 4.2–5.4)
SCAN OF BLOOD SMEAR: NORMAL
SEGMENTED NEUTROPHILS ABSOLUTE COUNT: 22 THOU/MM3 (ref 1.8–7.7)
SODIUM SERPL-SCNC: 139 MEQ/L (ref 135–145)
WBC # BLD AUTO: 27 THOU/MM3 (ref 4.8–10.8)

## 2023-09-21 PROCEDURE — 6370000000 HC RX 637 (ALT 250 FOR IP): Performed by: HOSPITALIST

## 2023-09-21 PROCEDURE — 2580000003 HC RX 258: Performed by: HOSPITALIST

## 2023-09-21 PROCEDURE — 6360000002 HC RX W HCPCS: Performed by: PHYSICIAN ASSISTANT

## 2023-09-21 PROCEDURE — 96372 THER/PROPH/DIAG INJ SC/IM: CPT

## 2023-09-21 PROCEDURE — 99232 SBSQ HOSP IP/OBS MODERATE 35: CPT | Performed by: HOSPITALIST

## 2023-09-21 PROCEDURE — 80048 BASIC METABOLIC PNL TOTAL CA: CPT

## 2023-09-21 PROCEDURE — 94640 AIRWAY INHALATION TREATMENT: CPT

## 2023-09-21 PROCEDURE — 6370000000 HC RX 637 (ALT 250 FOR IP): Performed by: PHYSICIAN ASSISTANT

## 2023-09-21 PROCEDURE — 94669 MECHANICAL CHEST WALL OSCILL: CPT

## 2023-09-21 PROCEDURE — 82948 REAGENT STRIP/BLOOD GLUCOSE: CPT

## 2023-09-21 PROCEDURE — G0378 HOSPITAL OBSERVATION PER HR: HCPCS

## 2023-09-21 PROCEDURE — 94761 N-INVAS EAR/PLS OXIMETRY MLT: CPT

## 2023-09-21 PROCEDURE — 36415 COLL VENOUS BLD VENIPUNCTURE: CPT

## 2023-09-21 PROCEDURE — 2580000003 HC RX 258: Performed by: PHYSICIAN ASSISTANT

## 2023-09-21 PROCEDURE — 6360000002 HC RX W HCPCS: Performed by: HOSPITALIST

## 2023-09-21 PROCEDURE — 2700000000 HC OXYGEN THERAPY PER DAY

## 2023-09-21 PROCEDURE — 96366 THER/PROPH/DIAG IV INF ADDON: CPT

## 2023-09-21 PROCEDURE — 85025 COMPLETE CBC W/AUTO DIFF WBC: CPT

## 2023-09-21 RX ADMIN — IPRATROPIUM BROMIDE AND ALBUTEROL SULFATE 1 DOSE: .5; 3 SOLUTION RESPIRATORY (INHALATION) at 12:20

## 2023-09-21 RX ADMIN — PANTOPRAZOLE SODIUM 40 MG: 40 TABLET, DELAYED RELEASE ORAL at 06:27

## 2023-09-21 RX ADMIN — CEFTRIAXONE SODIUM 1000 MG: 1 INJECTION, POWDER, FOR SOLUTION INTRAMUSCULAR; INTRAVENOUS at 12:07

## 2023-09-21 RX ADMIN — DOXEPIN HYDROCHLORIDE 50 MG: 50 CAPSULE ORAL at 19:44

## 2023-09-21 RX ADMIN — BUSPIRONE HYDROCHLORIDE 10 MG: 10 TABLET ORAL at 19:44

## 2023-09-21 RX ADMIN — ENOXAPARIN SODIUM 30 MG: 100 INJECTION SUBCUTANEOUS at 08:04

## 2023-09-21 RX ADMIN — IPRATROPIUM BROMIDE AND ALBUTEROL SULFATE 1 DOSE: .5; 3 SOLUTION RESPIRATORY (INHALATION) at 21:40

## 2023-09-21 RX ADMIN — ENOXAPARIN SODIUM 30 MG: 100 INJECTION SUBCUTANEOUS at 21:16

## 2023-09-21 RX ADMIN — METOPROLOL TARTRATE 50 MG: 50 TABLET, FILM COATED ORAL at 19:44

## 2023-09-21 RX ADMIN — SODIUM CHLORIDE, PRESERVATIVE FREE 10 ML: 5 INJECTION INTRAVENOUS at 08:05

## 2023-09-21 RX ADMIN — PREDNISONE 40 MG: 20 TABLET ORAL at 08:05

## 2023-09-21 RX ADMIN — GUAIFENESIN 600 MG: 600 TABLET, EXTENDED RELEASE ORAL at 08:05

## 2023-09-21 RX ADMIN — IPRATROPIUM BROMIDE AND ALBUTEROL SULFATE 1 DOSE: .5; 3 SOLUTION RESPIRATORY (INHALATION) at 08:25

## 2023-09-21 RX ADMIN — AZITHROMYCIN MONOHYDRATE 500 MG: 500 INJECTION, POWDER, LYOPHILIZED, FOR SOLUTION INTRAVENOUS at 13:29

## 2023-09-21 RX ADMIN — METOPROLOL TARTRATE 50 MG: 50 TABLET, FILM COATED ORAL at 08:05

## 2023-09-21 RX ADMIN — INSULIN LISPRO 1 UNITS: 100 INJECTION, SOLUTION INTRAVENOUS; SUBCUTANEOUS at 17:36

## 2023-09-21 RX ADMIN — Medication 1 TABLET: at 08:05

## 2023-09-21 RX ADMIN — CLOZAPINE 400 MG: 100 TABLET ORAL at 19:44

## 2023-09-21 RX ADMIN — ACETAMINOPHEN 650 MG: 325 TABLET ORAL at 19:38

## 2023-09-21 RX ADMIN — IPRATROPIUM BROMIDE AND ALBUTEROL SULFATE 1 DOSE: .5; 3 SOLUTION RESPIRATORY (INHALATION) at 16:04

## 2023-09-21 RX ADMIN — BUSPIRONE HYDROCHLORIDE 10 MG: 10 TABLET ORAL at 08:05

## 2023-09-21 RX ADMIN — CLONAZEPAM 0.5 MG: 0.5 TABLET ORAL at 19:49

## 2023-09-21 RX ADMIN — CLONAZEPAM 0.5 MG: 0.5 TABLET ORAL at 08:05

## 2023-09-21 RX ADMIN — GUAIFENESIN 600 MG: 600 TABLET, EXTENDED RELEASE ORAL at 19:45

## 2023-09-21 RX ADMIN — PRAZOSIN HYDROCHLORIDE 2 MG: 1 CAPSULE ORAL at 19:44

## 2023-09-21 ASSESSMENT — PAIN SCALES - GENERAL
PAINLEVEL_OUTOF10: 5
PAINLEVEL_OUTOF10: 6

## 2023-09-21 ASSESSMENT — PAIN DESCRIPTION - LOCATION
LOCATION: BACK
LOCATION: BACK

## 2023-09-21 ASSESSMENT — PAIN - FUNCTIONAL ASSESSMENT: PAIN_FUNCTIONAL_ASSESSMENT: ACTIVITIES ARE NOT PREVENTED

## 2023-09-21 ASSESSMENT — PAIN DESCRIPTION - ORIENTATION
ORIENTATION: UPPER
ORIENTATION: RIGHT;LEFT;LOWER

## 2023-09-21 ASSESSMENT — PAIN DESCRIPTION - DESCRIPTORS: DESCRIPTORS: ACHING

## 2023-09-21 NOTE — PLAN OF CARE
Problem: Respiratory - Adult  Goal: Clear lung sounds  9/21/2023 0833 by Lisa Weaver RCP  Outcome: Progressing   Continue aerosols to help improve breath sounds.

## 2023-09-21 NOTE — CARE COORDINATION
9/21/23, 2:59 PM EDT    DISCHARGE ON [address] day: 0  Location: -18/018-A Reason for admit: Acute respiratory failure with hypoxia ([address] ) [J96.01]  Acute hypoxemic respiratory failure ([address] ) [J96.01]   Procedure: n/a  Barriers to Discharge: Hospitalist following. Requiring 5L NC. IV rocephin, azithromycin. Nebs. Prednisone. PCP: NAMAN Bales - CNP   %  Patient Goals/Plan/Treatment Preferences: Plan discharge to home with aunt. Follows at [facility].

## 2023-09-21 NOTE — PLAN OF CARE
Problem: Respiratory - Adult  Goal: Clear lung sounds  Outcome: Progressing     Problem: Safety - Adult  Goal: Free from fall injury  Outcome: Progressing  Flowsheets (Taken 9/21/2023 0332)  Free From Fall Injury: Instruct family/caregiver on patient safety     Problem: Pain  Goal: Verbalizes/displays adequate comfort level or baseline comfort level  Outcome: Progressing  Flowsheets (Taken 9/21/2023 0332)  Verbalizes/displays adequate comfort level or baseline comfort level:   Encourage patient to monitor pain and request assistance   Assess pain using appropriate pain scale   Administer analgesics based on type and severity of pain and evaluate response   Implement non-pharmacological measures as appropriate and evaluate response   Care plan reviewed with patient. Patient verbalizes understanding of the plan of care and contributes to goal setting.

## 2023-09-21 NOTE — PROGRESS NOTES
Hospitalist Progress Note    Patient:  Luis Walker      Unit/Bed:5K-18/018-A    YOB: 1995    MRN: [id number]       Acct: [de-identified]     PCP: NAMAN Guy CNP    Date of Admission: 9/19/2023    Assessment/Plan:    Acute hypoxemic respiratory failure:  continue to wean O2. Encourage aggressive pulmonary toiletry  CAP:  continue with IV Rocephin and Azithromycin  RAD:  continue with steroids  DM 2: prn sliding scale  VTE prophylaxis:  Lovenox        Subjective (past 24 hours):   Patient seen and examined at bedside, states she is feeling slightly better. RN reports ambulating around the room  No new complaints or acute overnight events.        Medications:  Reviewed    Infusion Medications    sodium chloride      dextrose       Scheduled Medications    sodium chloride flush  10 mL IntraVENous 2 times per day    enoxaparin  30 mg SubCUTAneous BID    ipratropium 0.5 mg-albuterol 2.5 mg  1 Dose Inhalation Q4H WA RT    predniSONE  40 mg Oral Daily    busPIRone  10 mg Oral BID    doxepin  50 mg Oral Nightly    metoprolol tartrate  50 mg Oral BID    multivitamin  1 tablet Oral Daily    pantoprazole  40 mg Oral QAM AC    prazosin  2 mg Oral Nightly    ketorolac  15 mg IntraVENous Once    nicotine  1 patch TransDERmal Daily    insulin lispro  0-4 Units SubCUTAneous TID WC    insulin lispro  0-4 Units SubCUTAneous Nightly    guaiFENesin  600 mg Oral BID    cefTRIAXone (ROCEPHIN) IV  1,000 mg IntraVENous Q24H    azithromycin  500 mg IntraVENous Q24H    cloZAPine  400 mg Oral Nightly     PRN Meds: sodium chloride flush, sodium chloride, ondansetron **OR** ondansetron, polyethylene glycol, acetaminophen **OR** acetaminophen, potassium chloride **OR** potassium alternative oral replacement **OR** potassium chloride, magnesium sulfate, benzonatate, clonazePAM, glucose, dextrose bolus **OR** dextrose bolus, glucagon (rDNA), dextrose, albuterol    No intake or output data in the 24 hours

## 2023-09-22 ENCOUNTER — APPOINTMENT (OUTPATIENT)
Dept: GENERAL RADIOLOGY | Age: 28
End: 2023-09-22
Payer: MEDICAID

## 2023-09-22 LAB
ANION GAP SERPL CALC-SCNC: 7 MEQ/L (ref 8–16)
ARTERIAL PATENCY WRIST A: POSITIVE
BACTERIA THROAT AEROBE CULT: NORMAL
BASE EXCESS BLDA CALC-SCNC: -1.5 MMOL/L (ref -2.5–2.5)
BASOPHILS ABSOLUTE: 0.1 THOU/MM3 (ref 0–0.1)
BASOPHILS NFR BLD AUTO: 0.4 %
BDY SITE: NORMAL
BUN SERPL-MCNC: 15 MG/DL (ref 7–22)
CALCIUM SERPL-MCNC: 9.2 MG/DL (ref 8.5–10.5)
CHLORIDE SERPL-SCNC: 107 MEQ/L (ref 98–111)
CO2 SERPL-SCNC: 26 MEQ/L (ref 23–33)
COLLECTED BY:: NORMAL
CREAT SERPL-MCNC: 0.7 MG/DL (ref 0.4–1.2)
DEPRECATED RDW RBC AUTO: 50.4 FL (ref 35–45)
DEVICE: NORMAL
EOSINOPHIL NFR BLD AUTO: 0.1 %
EOSINOPHILS ABSOLUTE: 0 THOU/MM3 (ref 0–0.4)
ERYTHROCYTE [DISTWIDTH] IN BLOOD BY AUTOMATED COUNT: 14.6 % (ref 11.5–14.5)
FIO2 ON VENT O2 ANALYZER: 3 %
GFR SERPL CREATININE-BSD FRML MDRD: > 60 ML/MIN/1.73M2
GLUCOSE BLD STRIP.AUTO-MCNC: 121 MG/DL (ref 70–108)
GLUCOSE BLD STRIP.AUTO-MCNC: 156 MG/DL (ref 70–108)
GLUCOSE BLD STRIP.AUTO-MCNC: 211 MG/DL (ref 70–108)
GLUCOSE BLD STRIP.AUTO-MCNC: 88 MG/DL (ref 70–108)
GLUCOSE SERPL-MCNC: 93 MG/DL (ref 70–108)
HCO3 BLDA-SCNC: 24 MMOL/L (ref 23–28)
HCT VFR BLD AUTO: 38.4 % (ref 37–47)
HGB BLD-MCNC: 11.8 GM/DL (ref 12–16)
IMM GRANULOCYTES # BLD AUTO: 0.42 THOU/MM3 (ref 0–0.07)
IMM GRANULOCYTES NFR BLD AUTO: 2 %
LYMPHOCYTES ABSOLUTE: 6.1 THOU/MM3 (ref 1–4.8)
LYMPHOCYTES NFR BLD AUTO: 29.5 %
MCH RBC QN AUTO: 29 PG (ref 26–33)
MCHC RBC AUTO-ENTMCNC: 30.7 GM/DL (ref 32.2–35.5)
MCV RBC AUTO: 94.3 FL (ref 81–99)
MONOCYTES ABSOLUTE: 1.2 THOU/MM3 (ref 0.4–1.3)
MONOCYTES NFR BLD AUTO: 5.9 %
NEUTROPHILS NFR BLD AUTO: 62.1 %
NRBC BLD AUTO-RTO: 0 /100 WBC
PCO2 BLDA: 43 MMHG (ref 35–45)
PH BLDA: 7.36 [PH] (ref 7.35–7.45)
PLATELET # BLD AUTO: 319 THOU/MM3 (ref 130–400)
PLATELET BLD QL SMEAR: ADEQUATE
PMV BLD AUTO: 10 FL (ref 9.4–12.4)
PO2 BLDA: 79 MMHG (ref 71–104)
POTASSIUM SERPL-SCNC: 4.2 MEQ/L (ref 3.5–5.2)
RBC # BLD AUTO: 4.07 MILL/MM3 (ref 4.2–5.4)
SAO2 % BLDA: 95 %
SCAN OF BLOOD SMEAR: NORMAL
SEGMENTED NEUTROPHILS ABSOLUTE COUNT: 12.9 THOU/MM3 (ref 1.8–7.7)
SODIUM SERPL-SCNC: 140 MEQ/L (ref 135–145)
WBC # BLD AUTO: 20.7 THOU/MM3 (ref 4.8–10.8)

## 2023-09-22 PROCEDURE — 36415 COLL VENOUS BLD VENIPUNCTURE: CPT

## 2023-09-22 PROCEDURE — 82948 REAGENT STRIP/BLOOD GLUCOSE: CPT

## 2023-09-22 PROCEDURE — 36600 WITHDRAWAL OF ARTERIAL BLOOD: CPT

## 2023-09-22 PROCEDURE — 85025 COMPLETE CBC W/AUTO DIFF WBC: CPT

## 2023-09-22 PROCEDURE — 96366 THER/PROPH/DIAG IV INF ADDON: CPT

## 2023-09-22 PROCEDURE — 94669 MECHANICAL CHEST WALL OSCILL: CPT

## 2023-09-22 PROCEDURE — 2580000003 HC RX 258: Performed by: HOSPITALIST

## 2023-09-22 PROCEDURE — 94760 N-INVAS EAR/PLS OXIMETRY 1: CPT

## 2023-09-22 PROCEDURE — 96376 TX/PRO/DX INJ SAME DRUG ADON: CPT

## 2023-09-22 PROCEDURE — 6360000002 HC RX W HCPCS: Performed by: HOSPITALIST

## 2023-09-22 PROCEDURE — 6360000002 HC RX W HCPCS: Performed by: PHYSICIAN ASSISTANT

## 2023-09-22 PROCEDURE — 6370000000 HC RX 637 (ALT 250 FOR IP): Performed by: PHYSICIAN ASSISTANT

## 2023-09-22 PROCEDURE — G0378 HOSPITAL OBSERVATION PER HR: HCPCS

## 2023-09-22 PROCEDURE — 2580000003 HC RX 258: Performed by: PHYSICIAN ASSISTANT

## 2023-09-22 PROCEDURE — 94640 AIRWAY INHALATION TREATMENT: CPT

## 2023-09-22 PROCEDURE — 71046 X-RAY EXAM CHEST 2 VIEWS: CPT

## 2023-09-22 PROCEDURE — 80048 BASIC METABOLIC PNL TOTAL CA: CPT

## 2023-09-22 PROCEDURE — 2700000000 HC OXYGEN THERAPY PER DAY

## 2023-09-22 PROCEDURE — 96372 THER/PROPH/DIAG INJ SC/IM: CPT

## 2023-09-22 PROCEDURE — 99232 SBSQ HOSP IP/OBS MODERATE 35: CPT | Performed by: HOSPITALIST

## 2023-09-22 PROCEDURE — 6370000000 HC RX 637 (ALT 250 FOR IP): Performed by: HOSPITALIST

## 2023-09-22 PROCEDURE — 82803 BLOOD GASES ANY COMBINATION: CPT

## 2023-09-22 RX ORDER — IPRATROPIUM BROMIDE AND ALBUTEROL SULFATE 2.5; .5 MG/3ML; MG/3ML
1 SOLUTION RESPIRATORY (INHALATION) 3 TIMES DAILY
Status: DISCONTINUED | OUTPATIENT
Start: 2023-09-23 | End: 2023-09-23 | Stop reason: HOSPADM

## 2023-09-22 RX ADMIN — DOXEPIN HYDROCHLORIDE 50 MG: 50 CAPSULE ORAL at 20:16

## 2023-09-22 RX ADMIN — ACETAMINOPHEN 650 MG: 325 TABLET ORAL at 20:16

## 2023-09-22 RX ADMIN — PREDNISONE 40 MG: 20 TABLET ORAL at 09:29

## 2023-09-22 RX ADMIN — IPRATROPIUM BROMIDE AND ALBUTEROL SULFATE 1 DOSE: .5; 3 SOLUTION RESPIRATORY (INHALATION) at 12:41

## 2023-09-22 RX ADMIN — CLONAZEPAM 0.5 MG: 0.5 TABLET ORAL at 09:25

## 2023-09-22 RX ADMIN — AZITHROMYCIN MONOHYDRATE 500 MG: 500 INJECTION, POWDER, LYOPHILIZED, FOR SOLUTION INTRAVENOUS at 14:17

## 2023-09-22 RX ADMIN — ACETAMINOPHEN 650 MG: 325 TABLET ORAL at 09:25

## 2023-09-22 RX ADMIN — WATER 60 MG: 1 INJECTION INTRAMUSCULAR; INTRAVENOUS; SUBCUTANEOUS at 16:46

## 2023-09-22 RX ADMIN — METOPROLOL TARTRATE 50 MG: 50 TABLET, FILM COATED ORAL at 20:16

## 2023-09-22 RX ADMIN — SODIUM CHLORIDE, PRESERVATIVE FREE 10 ML: 5 INJECTION INTRAVENOUS at 20:25

## 2023-09-22 RX ADMIN — IPRATROPIUM BROMIDE AND ALBUTEROL SULFATE 1 DOSE: .5; 3 SOLUTION RESPIRATORY (INHALATION) at 20:40

## 2023-09-22 RX ADMIN — BUSPIRONE HYDROCHLORIDE 10 MG: 10 TABLET ORAL at 09:27

## 2023-09-22 RX ADMIN — CEFTRIAXONE SODIUM 1000 MG: 1 INJECTION, POWDER, FOR SOLUTION INTRAMUSCULAR; INTRAVENOUS at 12:18

## 2023-09-22 RX ADMIN — BUSPIRONE HYDROCHLORIDE 10 MG: 10 TABLET ORAL at 20:15

## 2023-09-22 RX ADMIN — Medication 1 TABLET: at 09:29

## 2023-09-22 RX ADMIN — METOPROLOL TARTRATE 50 MG: 50 TABLET, FILM COATED ORAL at 09:28

## 2023-09-22 RX ADMIN — ONDANSETRON 4 MG: 2 INJECTION INTRAMUSCULAR; INTRAVENOUS at 16:01

## 2023-09-22 RX ADMIN — CLOZAPINE 400 MG: 100 TABLET ORAL at 20:16

## 2023-09-22 RX ADMIN — GUAIFENESIN 600 MG: 600 TABLET, EXTENDED RELEASE ORAL at 20:16

## 2023-09-22 RX ADMIN — GUAIFENESIN 600 MG: 600 TABLET, EXTENDED RELEASE ORAL at 09:28

## 2023-09-22 RX ADMIN — IPRATROPIUM BROMIDE AND ALBUTEROL SULFATE 1 DOSE: .5; 3 SOLUTION RESPIRATORY (INHALATION) at 08:37

## 2023-09-22 RX ADMIN — ENOXAPARIN SODIUM 30 MG: 100 INJECTION SUBCUTANEOUS at 20:16

## 2023-09-22 RX ADMIN — IPRATROPIUM BROMIDE AND ALBUTEROL SULFATE 1 DOSE: .5; 3 SOLUTION RESPIRATORY (INHALATION) at 16:16

## 2023-09-22 RX ADMIN — ENOXAPARIN SODIUM 30 MG: 100 INJECTION SUBCUTANEOUS at 09:32

## 2023-09-22 RX ADMIN — PANTOPRAZOLE SODIUM 40 MG: 40 TABLET, DELAYED RELEASE ORAL at 06:23

## 2023-09-22 RX ADMIN — CLONAZEPAM 0.5 MG: 0.5 TABLET ORAL at 20:16

## 2023-09-22 RX ADMIN — SODIUM CHLORIDE, PRESERVATIVE FREE 10 ML: 5 INJECTION INTRAVENOUS at 09:28

## 2023-09-22 RX ADMIN — PRAZOSIN HYDROCHLORIDE 2 MG: 1 CAPSULE ORAL at 20:15

## 2023-09-22 ASSESSMENT — PAIN SCALES - GENERAL
PAINLEVEL_OUTOF10: 3
PAINLEVEL_OUTOF10: 2
PAINLEVEL_OUTOF10: 4
PAINLEVEL_OUTOF10: 4

## 2023-09-22 ASSESSMENT — PAIN DESCRIPTION - LOCATION
LOCATION: BACK
LOCATION: HEAD;NECK
LOCATION: BACK;NECK

## 2023-09-22 ASSESSMENT — PAIN DESCRIPTION - DESCRIPTORS
DESCRIPTORS: ACHING
DESCRIPTORS: ACHING

## 2023-09-22 ASSESSMENT — PAIN DESCRIPTION - FREQUENCY: FREQUENCY: INTERMITTENT

## 2023-09-22 ASSESSMENT — PAIN DESCRIPTION - ONSET: ONSET: GRADUAL

## 2023-09-22 ASSESSMENT — PAIN DESCRIPTION - PAIN TYPE: TYPE: ACUTE PAIN

## 2023-09-22 ASSESSMENT — PAIN DESCRIPTION - ORIENTATION
ORIENTATION: UPPER

## 2023-09-22 ASSESSMENT — PAIN - FUNCTIONAL ASSESSMENT
PAIN_FUNCTIONAL_ASSESSMENT: ACTIVITIES ARE NOT PREVENTED
PAIN_FUNCTIONAL_ASSESSMENT: ACTIVITIES ARE NOT PREVENTED

## 2023-09-22 NOTE — PLAN OF CARE
Problem: Respiratory - Adult  Goal: Clear lung sounds  9/22/2023 0840 by Kevin Quintero RCP  Outcome: Progressing   Patient agreed on goals

## 2023-09-22 NOTE — CARE COORDINATION
Weekend discharge anticipated. 9/22/23, 2:53 PM EDT    Patient goals/plan/ treatment preferences discussed by  and . Patient goals/plan/ treatment preferences reviewed with patient/ family. Patient/ family verbalize understanding of discharge plan and are in agreement with goal/plan/treatment preferences. Understanding was demonstrated using the teach back method. AVS provided by RN at time of discharge, which includes all necessary medical information pertaining to the patients current course of illness, treatment, post-discharge goals of care, and treatment preferences.      Services At/After Discharge: None

## 2023-09-22 NOTE — PROGRESS NOTES
Hospitalist Progress Note    Patient:  Jade Vences      Unit/Bed:5K-18/018-A    YOB: 1995    MRN: [id number]       Acct: [de-identified]     PCP: NAMAN Duran CNP    Date of Admission: 9/19/2023    Assessment/Plan:    Acute hypoxemic respiratory failure:  continue to wean O2. Encourage aggressive pulmonary toiletry. Will wean down to [facility] today, home O2 eval prior to discharge. CAP:  continue with IV Rocephin and Azithromycin  RAD:  continue with steroids  DM 2: prn sliding scale  VTE prophylaxis:  Lovenox        Subjective (past 24 hours):   Patient seen and examined at bedside, states her SOB is slowly improving. No new complaints or acute overnight events.        Medications:  Reviewed    Infusion Medications    sodium chloride      dextrose       Scheduled Medications    methylPREDNISolone  60 mg IntraVENous Once    sodium chloride flush  10 mL IntraVENous 2 times per day    enoxaparin  30 mg SubCUTAneous BID    ipratropium 0.5 mg-albuterol 2.5 mg  1 Dose Inhalation Q4H WA RT    predniSONE  40 mg Oral Daily    busPIRone  10 mg Oral BID    doxepin  50 mg Oral Nightly    metoprolol tartrate  50 mg Oral BID    multivitamin  1 tablet Oral Daily    pantoprazole  40 mg Oral QAM AC    prazosin  2 mg Oral Nightly    ketorolac  15 mg IntraVENous Once    nicotine  1 patch TransDERmal Daily    insulin lispro  0-4 Units SubCUTAneous TID WC    insulin lispro  0-4 Units SubCUTAneous Nightly    guaiFENesin  600 mg Oral BID    cefTRIAXone (ROCEPHIN) IV  1,000 mg IntraVENous Q24H    azithromycin  500 mg IntraVENous Q24H    cloZAPine  400 mg Oral Nightly     PRN Meds: sodium chloride flush, sodium chloride, ondansetron **OR** ondansetron, polyethylene glycol, acetaminophen **OR** acetaminophen, potassium chloride **OR** potassium alternative oral replacement **OR** potassium chloride, magnesium sulfate, benzonatate, clonazePAM, glucose, dextrose bolus **OR** dextrose bolus, glucagon (rDNA), in caliber. The heart is normal in size. There is anteromedial right upper lobe consolidation and left lung base pulmonary opacities. No pleural effusion or pneumothorax is seen. No evidence for pulmonary embolus. Anteromedial right upper lobe consolidation and left lung base pulmonary opacities. The differential diagnosis includes atelectasis and pneumonia. This document has been electronically signed by: Sin Medrano MD on 09/20/2023 06:00 AM All CTs at this facility use dose modulation techniques and iterative reconstructions, and/or weight-based dosing when appropriate to reduce radiation to a low as reasonably achievable. 3D Post-processing was performed on this study. XR CHEST PORTABLE    Result Date: 9/20/2023  1 view chest x-ray Comparison: 09/09/2023 Findings: Lungs are clear without acute infiltrates. No pneumothorax. Heart size normal. No acute bony abnormalities. Impression: No acute processes.  This document has been electronically signed by: Tamara Cazares MD on 09/20/2023 12:17 AM      DVT prophylaxis: [x] Lovenox                                 [] SCDs                                 [] SQ Heparin                                 [] Encourage ambulation           [] Already on Anticoagulation     Code Status: Full Code    Tele:   [] yes             [] no    Active Hospital Problems    Diagnosis Date Noted    Acute hypoxemic respiratory failure ([address]) [J96.01] 09/20/2023       Electronically signed by Beau Johnson MD on 9/22/2023 at 12:06 PM

## 2023-09-22 NOTE — CARE COORDINATION
9/22/23, 2:47 PM EDT    DISCHARGE ON [address] day: 0  Location: -18/018-A Reason for admit: Acute respiratory failure with hypoxia ([address]) [J96.01]  Acute hypoxemic respiratory failure ([address]) [J96.01]   Barriers to Discharge: IV Zithromax, Rocephin, Lovenox, DM management, Duoneb inhaler, Solumedrol x 1 dose, Nicotine patch, prn Proventil, Tylenol, Klonopin, and Zofran, daily BMP and CBC, regular diet, oxygen, acapella, incentive spirometry, up as tolerated. PCP: NAMAN Arteaga - CNP   %  Patient Goals/Plan/Treatment Preferences: Ileana Guzmán is from home residing with her Aunt. Monitoring for home oxygen. Insurance is [employer]. [employer] Is her DME provider.

## 2023-09-22 NOTE — PLAN OF CARE
Problem: Respiratory - Adult  Goal: Clear lung sounds  Outcome: Progressing     Problem: Respiratory - Adult  Goal: Achieves optimal ventilation and oxygenation  Outcome: Progressing     Problem: Safety - Adult  Goal: Free from fall injury  Outcome: Progressing  Flowsheets (Taken 9/21/2023 0332 by Nancy Montanez RN)  Free From Fall Injury: Instruct family/caregiver on patient safety     Problem: Chronic Conditions and Co-morbidities  Goal: Patient's chronic conditions and co-morbidity symptoms are monitored and maintained or improved  Outcome: Progressing  Flowsheets (Taken 9/20/2023 0809 by Rian Reyes RN)  Care Plan - Patient's Chronic Conditions and Co-Morbidity Symptoms are Monitored and Maintained or Improved: Monitor and assess patient's chronic conditions and comorbid symptoms for stability, deterioration, or improvement     Problem: Pain  Goal: Verbalizes/displays adequate comfort level or baseline comfort level  Outcome: Progressing  Flowsheets (Taken 9/21/2023 0332 by Nancy Montanez RN)  Verbalizes/displays adequate comfort level or baseline comfort level:   Encourage patient to monitor pain and request assistance   Assess pain using appropriate pain scale   Administer analgesics based on type and severity of pain and evaluate response   Implement non-pharmacological measures as appropriate and evaluate response     Problem: Skin/Tissue Integrity  Goal: Absence of new skin breakdown  Description: 1. Monitor for areas of redness and/or skin breakdown  2. Assess vascular access sites hourly  3. Every 4-6 hours minimum:  Change oxygen saturation probe site  4. Every 4-6 hours:  If on nasal continuous positive airway pressure, respiratory therapy assess nares and determine need for appliance change or resting period.   Outcome: Progressing  Note: No new evidence of skin breakdown on this shift     Problem: Infection - Adult  Goal: Absence of infection at discharge  Outcome:

## 2023-09-23 VITALS
DIASTOLIC BLOOD PRESSURE: 70 MMHG | WEIGHT: 293 LBS | HEIGHT: 63 IN | HEART RATE: 89 BPM | TEMPERATURE: 97.9 F | BODY MASS INDEX: 51.91 KG/M2 | OXYGEN SATURATION: 96 % | SYSTOLIC BLOOD PRESSURE: 133 MMHG | RESPIRATION RATE: 18 BRPM

## 2023-09-23 PROBLEM — J45.21 MILD INTERMITTENT ASTHMA WITH EXACERBATION: Status: ACTIVE | Noted: 2023-09-23

## 2023-09-23 LAB
ANION GAP SERPL CALC-SCNC: 10 MEQ/L (ref 8–16)
BASOPHILS ABSOLUTE: 0 THOU/MM3 (ref 0–0.1)
BASOPHILS NFR BLD AUTO: 0.2 %
BUN SERPL-MCNC: 17 MG/DL (ref 7–22)
CALCIUM SERPL-MCNC: 9.2 MG/DL (ref 8.5–10.5)
CHLORIDE SERPL-SCNC: 108 MEQ/L (ref 98–111)
CO2 SERPL-SCNC: 23 MEQ/L (ref 23–33)
CREAT SERPL-MCNC: 0.6 MG/DL (ref 0.4–1.2)
DEPRECATED RDW RBC AUTO: 48.1 FL (ref 35–45)
EOSINOPHIL NFR BLD AUTO: 0 %
EOSINOPHILS ABSOLUTE: 0 THOU/MM3 (ref 0–0.4)
ERYTHROCYTE [DISTWIDTH] IN BLOOD BY AUTOMATED COUNT: 14.1 % (ref 11.5–14.5)
GFR SERPL CREATININE-BSD FRML MDRD: > 60 ML/MIN/1.73M2
GLUCOSE BLD STRIP.AUTO-MCNC: 119 MG/DL (ref 70–108)
GLUCOSE BLD STRIP.AUTO-MCNC: 143 MG/DL (ref 70–108)
GLUCOSE SERPL-MCNC: 135 MG/DL (ref 70–108)
HCT VFR BLD AUTO: 39.9 % (ref 37–47)
HGB BLD-MCNC: 12.3 GM/DL (ref 12–16)
IMM GRANULOCYTES # BLD AUTO: 0.4 THOU/MM3 (ref 0–0.07)
IMM GRANULOCYTES NFR BLD AUTO: 1.8 %
LYMPHOCYTES ABSOLUTE: 2.9 THOU/MM3 (ref 1–4.8)
LYMPHOCYTES NFR BLD AUTO: 12.8 %
MCH RBC QN AUTO: 28.9 PG (ref 26–33)
MCHC RBC AUTO-ENTMCNC: 30.8 GM/DL (ref 32.2–35.5)
MCV RBC AUTO: 93.9 FL (ref 81–99)
MONOCYTES ABSOLUTE: 0.8 THOU/MM3 (ref 0.4–1.3)
MONOCYTES NFR BLD AUTO: 3.5 %
NEUTROPHILS NFR BLD AUTO: 81.7 %
NRBC BLD AUTO-RTO: 0 /100 WBC
PLATELET # BLD AUTO: 345 THOU/MM3 (ref 130–400)
PMV BLD AUTO: 9.8 FL (ref 9.4–12.4)
POTASSIUM SERPL-SCNC: 4.5 MEQ/L (ref 3.5–5.2)
RBC # BLD AUTO: 4.25 MILL/MM3 (ref 4.2–5.4)
SEGMENTED NEUTROPHILS ABSOLUTE COUNT: 18.2 THOU/MM3 (ref 1.8–7.7)
SODIUM SERPL-SCNC: 141 MEQ/L (ref 135–145)
WBC # BLD AUTO: 22.3 THOU/MM3 (ref 4.8–10.8)

## 2023-09-23 PROCEDURE — 85025 COMPLETE CBC W/AUTO DIFF WBC: CPT

## 2023-09-23 PROCEDURE — 94640 AIRWAY INHALATION TREATMENT: CPT

## 2023-09-23 PROCEDURE — 2700000000 HC OXYGEN THERAPY PER DAY

## 2023-09-23 PROCEDURE — G0378 HOSPITAL OBSERVATION PER HR: HCPCS

## 2023-09-23 PROCEDURE — 96372 THER/PROPH/DIAG INJ SC/IM: CPT

## 2023-09-23 PROCEDURE — 96376 TX/PRO/DX INJ SAME DRUG ADON: CPT

## 2023-09-23 PROCEDURE — 2580000003 HC RX 258: Performed by: PHYSICIAN ASSISTANT

## 2023-09-23 PROCEDURE — 6370000000 HC RX 637 (ALT 250 FOR IP): Performed by: PHYSICIAN ASSISTANT

## 2023-09-23 PROCEDURE — 99232 SBSQ HOSP IP/OBS MODERATE 35: CPT | Performed by: HOSPITALIST

## 2023-09-23 PROCEDURE — 82948 REAGENT STRIP/BLOOD GLUCOSE: CPT

## 2023-09-23 PROCEDURE — 99239 HOSP IP/OBS DSCHRG MGMT >30: CPT | Performed by: HOSPITALIST

## 2023-09-23 PROCEDURE — 36415 COLL VENOUS BLD VENIPUNCTURE: CPT

## 2023-09-23 PROCEDURE — 2580000003 HC RX 258: Performed by: HOSPITALIST

## 2023-09-23 PROCEDURE — 6360000002 HC RX W HCPCS: Performed by: HOSPITALIST

## 2023-09-23 PROCEDURE — 80048 BASIC METABOLIC PNL TOTAL CA: CPT

## 2023-09-23 PROCEDURE — 94761 N-INVAS EAR/PLS OXIMETRY MLT: CPT

## 2023-09-23 PROCEDURE — 94669 MECHANICAL CHEST WALL OSCILL: CPT

## 2023-09-23 PROCEDURE — 6360000002 HC RX W HCPCS: Performed by: PHYSICIAN ASSISTANT

## 2023-09-23 PROCEDURE — 96366 THER/PROPH/DIAG IV INF ADDON: CPT

## 2023-09-23 RX ORDER — AMOXICILLIN AND CLAVULANATE POTASSIUM 875; 125 MG/1; MG/1
1 TABLET, FILM COATED ORAL 2 TIMES DAILY
Qty: 8 TABLET | Refills: 0 | Status: SHIPPED | OUTPATIENT
Start: 2023-09-23 | End: 2023-09-27

## 2023-09-23 RX ORDER — PREDNISONE 20 MG/1
40 TABLET ORAL DAILY
Qty: 4 TABLET | Refills: 0 | Status: SHIPPED | OUTPATIENT
Start: 2023-09-24 | End: 2023-09-23 | Stop reason: SDUPTHER

## 2023-09-23 RX ORDER — AMOXICILLIN AND CLAVULANATE POTASSIUM 875; 125 MG/1; MG/1
1 TABLET, FILM COATED ORAL 2 TIMES DAILY
Qty: 8 TABLET | Refills: 0 | Status: SHIPPED | OUTPATIENT
Start: 2023-09-23 | End: 2023-09-23 | Stop reason: SDUPTHER

## 2023-09-23 RX ORDER — PREDNISONE 20 MG/1
40 TABLET ORAL DAILY
Qty: 4 TABLET | Refills: 0 | Status: SHIPPED | OUTPATIENT
Start: 2023-09-24 | End: 2023-09-26

## 2023-09-23 RX ADMIN — BUSPIRONE HYDROCHLORIDE 10 MG: 10 TABLET ORAL at 09:22

## 2023-09-23 RX ADMIN — CEFTRIAXONE SODIUM 1000 MG: 1 INJECTION, POWDER, FOR SOLUTION INTRAMUSCULAR; INTRAVENOUS at 12:20

## 2023-09-23 RX ADMIN — IPRATROPIUM BROMIDE AND ALBUTEROL SULFATE 1 DOSE: .5; 3 SOLUTION RESPIRATORY (INHALATION) at 13:32

## 2023-09-23 RX ADMIN — Medication 1 TABLET: at 09:23

## 2023-09-23 RX ADMIN — SODIUM CHLORIDE, PRESERVATIVE FREE 10 ML: 5 INJECTION INTRAVENOUS at 09:24

## 2023-09-23 RX ADMIN — AZITHROMYCIN MONOHYDRATE 500 MG: 500 INJECTION, POWDER, LYOPHILIZED, FOR SOLUTION INTRAVENOUS at 13:50

## 2023-09-23 RX ADMIN — ACETAMINOPHEN 650 MG: 325 TABLET ORAL at 09:22

## 2023-09-23 RX ADMIN — METOPROLOL TARTRATE 50 MG: 50 TABLET, FILM COATED ORAL at 09:23

## 2023-09-23 RX ADMIN — GUAIFENESIN 600 MG: 600 TABLET, EXTENDED RELEASE ORAL at 09:23

## 2023-09-23 RX ADMIN — PREDNISONE 40 MG: 20 TABLET ORAL at 09:23

## 2023-09-23 RX ADMIN — PANTOPRAZOLE SODIUM 40 MG: 40 TABLET, DELAYED RELEASE ORAL at 06:44

## 2023-09-23 RX ADMIN — ENOXAPARIN SODIUM 30 MG: 100 INJECTION SUBCUTANEOUS at 09:25

## 2023-09-23 RX ADMIN — IPRATROPIUM BROMIDE AND ALBUTEROL SULFATE 1 DOSE: .5; 3 SOLUTION RESPIRATORY (INHALATION) at 09:05

## 2023-09-23 RX ADMIN — ONDANSETRON 4 MG: 2 INJECTION INTRAMUSCULAR; INTRAVENOUS at 13:50

## 2023-09-23 ASSESSMENT — PAIN SCALES - GENERAL
PAINLEVEL_OUTOF10: 3
PAINLEVEL_OUTOF10: 0

## 2023-09-23 ASSESSMENT — PAIN DESCRIPTION - DESCRIPTORS: DESCRIPTORS: ACHING

## 2023-09-23 ASSESSMENT — PAIN - FUNCTIONAL ASSESSMENT: PAIN_FUNCTIONAL_ASSESSMENT: ACTIVITIES ARE NOT PREVENTED

## 2023-09-23 ASSESSMENT — PAIN DESCRIPTION - LOCATION: LOCATION: HEAD

## 2023-09-23 NOTE — PLAN OF CARE
Problem: Respiratory - Adult  Goal: Lung sounds clear or within normal limits for patient  Outcome: Progressing  Goal: Clear lung sounds  Outcome: Progressing  Goal: Achieves optimal ventilation and oxygenation  Outcome: Progressing

## 2023-09-23 NOTE — DISCHARGE SUMMARY
Discharge Summary    Patient:  Cristy Fox  YOB: 1995    MRN: [id number]   Acct: [de-identified]    Primary Care Physician: NAMAN Llanos CNP    Admit date:  9/19/2023    Discharge date:   9/23/2023       Discharge Diagnoses:   Acute hypoxemic respiratory failure [facility])  Principal Problem:    Acute hypoxemic respiratory failure ([address] )  Resolved Problems:    * No resolved hospital problems. *        Admitted for: (HPI)  Cristy Fox is a 25 y.o. female ho presents to [address] [place] [address] with acute hypoxic respiratory failure. Patient states she started having URI symptoms of productive cough, congestion, and sore throat. She denies any associated fevers or chills. She went to urgent care yesterday for evaluation and was given supportive care medications. She came to the ED following new onset shortness of breath. Patient states prior to the onset of her URI she has been having a warm feeling in the center of her chest. Upon review patient has visited the ED four times within the past months for vague complaints of nausea and vomiting, dizziness, epigastric pain in addition to her endorsement of the warm feeling in her chest. She appears to have extensive GI history per chart review- hx of gastroparesis, rectal bleeding,   Patient states the last time she had these URI symptoms she had breathing difficulties and ultimately had pneumonia. She smokes one pack per day and has smoked since she was 11years old. She endorses smoking prior to her arrival in the ED. She denies chest pain, numbness and tingling, myalgias and arthralgias, hemoptysis     Hospital Course:  78-year-old female with history of mild intermittent asthma who was admitted for URI symptoms with cough and shortness of breath. Patient was given IV steroids and bronchodilators. She had been treated with IV antibiotics and transition to p.o. on day of discharge.   Patient required up to 5 L nasal CONTRAST Additional Contrast? None    Result Date: 9/15/2023  CT abdomen and pelvis with contrast Comparison: CT dated April 27, 2021. Findings: Lung bases are clear. No peritoneal free air. No acute fracture. Old healed right posterior 11th rib fracture. Adrenals are unremarkable. No delayed nephrogram. No solid renal mass or hydronephrosis. No visible gallbladder. No retroperitoneal mass. Distended urinary bladder without inflammatory change. Larger right ovary with cystic hypodensities. No pelvic ascites. Significant colonic stool burden. Appendix is unremarkable. No dilated small bowel loops or focal high-grade bowel inflammatory changes. Small fat containing umbilical hernia. 1. Normal appendix. No acute high-grade bowel inflammatory changes. 2. Asymmetric larger right ovary with possible cysts. Consider pelvic sonography to further evaluate. 3. Additional findings as above. This document has been electronically signed by: Arias Church DO on 09/15/2023 05:45 AM All CTs at this facility use dose modulation techniques and iterative reconstructions, and/or weight-based dosing when appropriate to reduce radiation to a low as reasonably achievable. XR CHEST (2 VW)    Result Date: 9/9/2023  Two-view chest Comparison: 09/24/2020 Findings: Upper limits of normal cardiac silhouette. No acute congestion, infiltrate or effusion currently. Mild hypoventilation versus comparison study. Intact thoracic spine. Normal bone mineralization. Impression: No acute cardiopulmonary pathology. This document has been electronically signed by: Loretta Marcano MD on 09/09/2023 04:02 AM      Diet:  ADULT DIET;  Regular    Activity:  Activity as tolerated (Patient may move about with assist as indicated or with supervision.)    Follow-up:  in the next few days with NAMAN Breaux CNP,      Disposition: home    Condition: Stable      Time Spent: 35 minutes    Electronically signed by Jake Leija MD on

## 2023-09-23 NOTE — PROGRESS NOTES
A home oxygen evaluation has been completed. [x]Patient is an inpatient. It is expected that the patient will be discharged within the next 48 hours. Qualified provider to write order for home prescription if patient qualifies. Social service/care managers will arrange for home oxygen. If patient is active, arrange for Home Medical supplier to assess for Oxygen Conserving Device per pulse oximetry. []Patient is an outpatient. Results will be faxed to the ordering provider. Qualified provider to write order for home prescription if patient qualifies and arranges for home oxygen. Patient was placed on room air for 20 minutes. SpO2 was 94 % on room air at rest. Patients SpO2 was not below 89% and did not qualified for home oxygen. Oxygen was not applied to maintain a SpO2 between 90-92% while at rest. Actual SpO2 was 94 %. Patient can ambulate for exercise flow rate. Patients was ambulated, SpO2 was 96% on room air to maintain SpO2 between 90-92% while exercising. Note: For any SpO2 at 92% see policy and procedure for possible qualifications.

## 2023-09-23 NOTE — PROGRESS NOTES
09/23/23 0909   RT Protocol   History Pulmonary Disease 1   Respiratory pattern 2   Breath sounds 4   Cough 0   Indications for Bronchodilator Therapy Wheezing associated with pulm disorder   Bronchodilator Assessment Score 7

## 2023-09-23 NOTE — PROGRESS NOTES
Hospitalist Progress Note    Patient:  Hanny Dillon      Unit/Bed:5K-18/018-A    YOB: 1995    MRN: [id number]       Acct: [de-identified]     PCP: NAMAN Bond CNP    Date of Admission: 9/19/2023    Assessment/Plan:    Acute hypoxemic respiratory failure:  continue to wean O2. Encourage aggressive pulmonary toiletry. Will wean down to [facility] today, home O2 eval prior to discharge. CAP:  continue with IV Rocephin and Azithromycin  Lethargy:  patient very sleepy yesterday, Klonopin held. Monitor closely  RAD:  continue with steroids  DM 2: prn sliding scale  VTE prophylaxis:  Lovenox  Dispo: dc planning, home O2 eval        Subjective (past 24 hours):   Patient seen and examined at bedside, awake in chair. Was reportedly sleeping most of the day yesterday. No new complaints or acute overnight events.        Medications:  Reviewed    Infusion Medications    sodium chloride      dextrose       Scheduled Medications    ipratropium 0.5 mg-albuterol 2.5 mg  1 Dose Inhalation TID    sodium chloride flush  10 mL IntraVENous 2 times per day    enoxaparin  30 mg SubCUTAneous BID    predniSONE  40 mg Oral Daily    busPIRone  10 mg Oral BID    doxepin  50 mg Oral Nightly    metoprolol tartrate  50 mg Oral BID    multivitamin  1 tablet Oral Daily    pantoprazole  40 mg Oral QAM AC    prazosin  2 mg Oral Nightly    ketorolac  15 mg IntraVENous Once    nicotine  1 patch TransDERmal Daily    insulin lispro  0-4 Units SubCUTAneous TID WC    insulin lispro  0-4 Units SubCUTAneous Nightly    guaiFENesin  600 mg Oral BID    cefTRIAXone (ROCEPHIN) IV  1,000 mg IntraVENous Q24H    azithromycin  500 mg IntraVENous Q24H    cloZAPine  400 mg Oral Nightly     PRN Meds: sodium chloride flush, sodium chloride, ondansetron **OR** ondansetron, polyethylene glycol, acetaminophen **OR** acetaminophen, potassium chloride **OR** potassium alternative oral replacement **OR** potassium chloride, magnesium

## 2023-09-23 NOTE — PLAN OF CARE
Problem: Respiratory - Adult  Goal: Clear lung sounds  9/23/2023 1007 by Parrish Ruff RN  Outcome: Progressing     Problem: Respiratory - Adult  Goal: Achieves optimal ventilation and oxygenation  9/23/2023 1007 by Parrish Ruff RN  Outcome: Progressing     Problem: Safety - Adult  Goal: Free from fall injury  Outcome: Progressing   Fall assessment completed. Patient using call light appropriately to call for assistance with ambulation to bathroom. Personal items within reach. Patient is also compliant with use of non-skid slippers. Problem: Chronic Conditions and Co-morbidities  Goal: Patient's chronic conditions and co-morbidity symptoms are monitored and maintained or improved  Outcome: Progressing  Flowsheets (Taken 9/23/2023 0806)  Care Plan - Patient's Chronic Conditions and Co-Morbidity Symptoms are Monitored and Maintained or Improved: Monitor and assess patient's chronic conditions and comorbid symptoms for stability, deterioration, or improvement     Problem: Pain  Goal: Verbalizes/displays adequate comfort level or baseline comfort level  Outcome: Progressing   Pain Assessment: None - Denies Pain  Pain Level: 3   Patient's Stated Pain Goal: 0 - No pain   Is pain goal met at this time? No     Non-Pharmaceutical Pain Intervention(s): Rest    Problem: Skin/Tissue Integrity  Goal: Absence of new skin breakdown  Description: 1. Monitor for areas of redness and/or skin breakdown  2. Assess vascular access sites hourly  3. Every 4-6 hours minimum:  Change oxygen saturation probe site  4. Every 4-6 hours:  If on nasal continuous positive airway pressure, respiratory therapy assess nares and determine need for appliance change or resting period. Outcome: Progressing   No skin breakdown this shift. Patient being assisted with turning. Patients states understanding of repositioning every two hours.     Problem: Infection - Adult  Goal: Absence of infection at discharge  Outcome:

## 2023-09-23 NOTE — PROGRESS NOTES
Discharge paperwork reviewed with patient. No questions verbalized. Discharge summary page signed. IV was removed. All belongings packed and taken with patient. Tech escorted patient to family vehicle. No other concerns.

## 2023-11-03 ENCOUNTER — HOSPITAL ENCOUNTER (EMERGENCY)
Age: 28
Discharge: HOME | End: 2023-11-03
Payer: MEDICAID

## 2023-11-03 ENCOUNTER — APPOINTMENT (OUTPATIENT)
Dept: GENERAL RADIOLOGY | Age: 28
End: 2023-11-03
Payer: MEDICAID

## 2023-11-03 VITALS
DIASTOLIC BLOOD PRESSURE: 83 MMHG | SYSTOLIC BLOOD PRESSURE: 131 MMHG | TEMPERATURE: 97.7 F | HEART RATE: 96 BPM | RESPIRATION RATE: 16 BRPM | OXYGEN SATURATION: 96 %

## 2023-11-03 DIAGNOSIS — U07.1 COVID-19: Primary | ICD-10-CM

## 2023-11-03 DIAGNOSIS — J18.9 PNEUMONIA OF LEFT LOWER LOBE DUE TO INFECTIOUS ORGANISM: ICD-10-CM

## 2023-11-03 LAB
FLUAV AG SPEC QL: NEGATIVE
FLUBV AG SPEC QL: NEGATIVE
S PYO AG THROAT QL: NEGATIVE
SARS-COV-2 RDRP RESP QL NAA+PROBE: DETECTED

## 2023-11-03 PROCEDURE — 87635 SARS-COV-2 COVID-19 AMP PRB: CPT

## 2023-11-03 PROCEDURE — 71046 X-RAY EXAM CHEST 2 VIEWS: CPT

## 2023-11-03 PROCEDURE — 87651 STREP A DNA AMP PROBE: CPT

## 2023-11-03 PROCEDURE — 99213 OFFICE O/P EST LOW 20 MIN: CPT

## 2023-11-03 PROCEDURE — 87804 INFLUENZA ASSAY W/OPTIC: CPT

## 2023-11-03 PROCEDURE — 99214 OFFICE O/P EST MOD 30 MIN: CPT | Performed by: NURSE PRACTITIONER

## 2023-11-03 RX ORDER — ALBUTEROL SULFATE 90 UG/1
2 AEROSOL, METERED RESPIRATORY (INHALATION) EVERY 6 HOURS PRN
Qty: 1 EACH | Refills: 0 | Status: SHIPPED | OUTPATIENT
Start: 2023-11-03

## 2023-11-03 RX ORDER — METHYLPREDNISOLONE 4 MG/1
TABLET ORAL
Qty: 21 TABLET | Refills: 0 | Status: SHIPPED | OUTPATIENT
Start: 2023-11-03 | End: 2023-11-09

## 2023-11-03 RX ORDER — LEVOFLOXACIN 750 MG/1
750 TABLET ORAL DAILY
Qty: 10 TABLET | Refills: 0 | Status: SHIPPED | OUTPATIENT
Start: 2023-11-03 | End: 2023-11-13

## 2023-11-03 RX ORDER — BENZONATATE 100 MG/1
100 CAPSULE ORAL 3 TIMES DAILY PRN
Qty: 20 CAPSULE | Refills: 0 | Status: SHIPPED | OUTPATIENT
Start: 2023-11-03 | End: 2023-11-10

## 2023-11-03 ASSESSMENT — ENCOUNTER SYMPTOMS
NAUSEA: 0
RHINORRHEA: 0
SHORTNESS OF BREATH: 1
VOMITING: 0
DIARRHEA: 0
SORE THROAT: 1
EYE REDNESS: 0
TROUBLE SWALLOWING: 0
COUGH: 1
EYE DISCHARGE: 0

## 2023-11-03 ASSESSMENT — LIFESTYLE VARIABLES
HOW MANY STANDARD DRINKS CONTAINING ALCOHOL DO YOU HAVE ON A TYPICAL DAY: PATIENT DOES NOT DRINK
HOW OFTEN DO YOU HAVE A DRINK CONTAINING ALCOHOL: NEVER

## 2023-11-03 ASSESSMENT — PAIN - FUNCTIONAL ASSESSMENT: PAIN_FUNCTIONAL_ASSESSMENT: NONE - DENIES PAIN

## 2023-11-03 NOTE — ED PROVIDER NOTES
5 [facility]  Urgent Care Encounter      CHIEF COMPLAINT       Chief Complaint   Patient presents with    Cough       Nurses Notes reviewed and I agree except as noted in the HPI. HISTORY OF PRESENT ILLNESS   Antonia Ratliff is a 25 y.o. female who presents with complaints of fatigue, congestion, sore throat, cough, and shortness of breath x3 days. Patient states she was admitted to the hospital for pneumonia for 3 days and has been home approximately 3 weeks. Patient states that multiple members of her household have similar symptoms presently. She is denying a fever. Patient states she has been using a spirometer for her lungs. She was taking DayQuil and NyQuil for symptoms with minimal relief. She is denying chance of pregnancy at today's visit. She is eating and drinking well. Patient is diabetic and last A1c was 6.5 on 6/1/2023. REVIEW OF SYSTEMS     Review of Systems   Constitutional:  Positive for fatigue. Negative for chills, diaphoresis and fever. HENT:  Positive for congestion and sore throat. Negative for ear pain, rhinorrhea and trouble swallowing. Eyes:  Negative for discharge and redness. Respiratory:  Positive for cough and shortness of breath. Cardiovascular:  Negative for chest pain. Gastrointestinal:  Negative for diarrhea, nausea and vomiting. Genitourinary:  Negative for decreased urine volume. Musculoskeletal:  Negative for neck pain and neck stiffness. Skin:  Negative for rash. Neurological:  Negative for headaches. Hematological:  Negative for adenopathy. Psychiatric/Behavioral:  Negative for sleep disturbance.         PAST MEDICAL HISTORY         Diagnosis Date    Arthritis     Bipolar 1 disorder ([facility])     Diabetes mellitus ([facility])     Fibromyalgia     Gastroparesis 2022    PONV (postoperative nausea and vomiting)     Pre-diabetes     Psychosis ([facility])     Schizophrenia ([facility])     Stomach pain        SURGICAL HISTORY     Patient  has a

## (undated) DEVICE — GENERAL LAPAROSCOPY PACK-LF: Brand: MEDLINE INDUSTRIES, INC.

## (undated) DEVICE — THIN OFFSET (9.0 X 0.38 X 25.0MM)

## (undated) DEVICE — BLADELESS OBTURATOR: Brand: WECK VISTA

## (undated) DEVICE — SUTURE VCRL + SZ 0 L27IN ABSRB VLT L26MM UR-6 5/8 CIR VCP603H

## (undated) DEVICE — COLUMN DRAPE

## (undated) DEVICE — GLOVE ORANGE PI 7 1/2   MSG9075

## (undated) DEVICE — GLOVE ORANGE PI 8   MSG9080

## (undated) DEVICE — SUTURE NONABSORBABLE MONOFILAMENT 4-0 PS-2 18 IN BLU PROLENE 8682H

## (undated) DEVICE — Device

## (undated) DEVICE — SYRINGE MED 10ML LUERLOCK TIP W/O SFTY DISP

## (undated) DEVICE — IMPREGNATED GAUZE DRESSING: Brand: CUTICERIN 7.5X7.5CM CTN 50

## (undated) DEVICE — ADHESIVE SKIN CLSR 0.7ML TOP DERMBND ADV

## (undated) DEVICE — NEEDLE HYPO 25GA L1IN PIVOTING SHLD FOR LUERLOCK SYR ECLIPSE

## (undated) DEVICE — HYPODERMIC SAFETY NEEDLE: Brand: MAGELLAN

## (undated) DEVICE — TAPE SUT MULTIFILAMENT POLYOLEFIN XBRAID TT 3910900011

## (undated) DEVICE — TIP COVER ACCESSORY

## (undated) DEVICE — Z DISCONTINUED BY MEDLINE USE 2711682 TRAY SKIN PREP DRY W/ PREM GLV

## (undated) DEVICE — SEAL

## (undated) DEVICE — ARM DRAPE

## (undated) DEVICE — SPONGE GZ W4XL4IN COT 12 PLY TYP VII WVN C FLD DSGN

## (undated) DEVICE — GLOVE ORTHO 8   MSG9480

## (undated) DEVICE — TROCAR: Brand: KII FIOS FIRST ENTRY

## (undated) DEVICE — BIOGUARD A/W CLEANING ADAPTER

## (undated) DEVICE — BAG SPEC REM 224ML W4XL6IN DIA10MM 1 HND GYN DISP ENDOPCH

## (undated) DEVICE — BASIC SINGLE BASIN BTC-LF: Brand: MEDLINE INDUSTRIES, INC.

## (undated) DEVICE — SUTURE ETHBND D SPEC NO 0 UR 6 30IN D9436

## (undated) DEVICE — GLOVE SURG SZ 7 L12IN FNGR THK94MIL TRNSLUC YEL LTX HYDRGEL

## (undated) DEVICE — GAUZE,SPONGE,8"X4",12PLY,XRAY,STRL,LF: Brand: MEDLINE

## (undated) DEVICE — TUBING INSUFFLATOR HEAT HUMIDIFIED SMK EVAC SET PNEUMOCLEAR

## (undated) DEVICE — SOLUTION SURG PREP POV IOD 7.5% 4 OZ

## (undated) DEVICE — PENCIL SMK EVAC ALL IN 1 DSGN ENH VISIBILITY IMPROVED AIR

## (undated) DEVICE — GLOVE SURG SZ 8 L12IN FNGR THK94MIL TRNSLUC YEL LTX HYDRGEL

## (undated) DEVICE — SOLUTION IRRIG 1500ML STRL H2O USP POUR PLAS BTL

## (undated) DEVICE — PREP SOL PVP IODINE 4%  4 OZ/BTL

## (undated) DEVICE — GOWN,SIRUS,NON REINFRCD,LARGE,SET IN SL: Brand: MEDLINE

## (undated) DEVICE — BANDAGE COMPR E SGL LAYERED CLSR BGE W/ CLP W4INXL15FT

## (undated) DEVICE — REDUCER: Brand: ENDOWRIST

## (undated) DEVICE — ELECTRO LUBE IS A SINGLE PATIENT USE DEVICE THAT IS INTENDED TO BE USED ON ELECTROSURGICAL ELECTRODES TO REDUCE STICKING.: Brand: KEY SURGICAL ELECTRO LUBE

## (undated) DEVICE — SYRINGE IRRIG 60ML SFT PLIABLE BLB EZ TO GRP 1 HND USE W/

## (undated) DEVICE — DRAPE,U/ SHT,SPLIT,PLAS,STERIL: Brand: MEDLINE

## (undated) DEVICE — BANDAGE,GAUZE,4.5"X4.1YD,STERILE,LF: Brand: MEDLINE

## (undated) DEVICE — INTENDED FOR TISSUE SEPARATION, AND OTHER PROCEDURES THAT REQUIRE A SHARP SURGICAL BLADE TO PUNCTURE OR CUT.: Brand: BARD-PARKER ® CARBON RIB-BACK BLADES

## (undated) DEVICE — SOLUTION ANTIFOG VIS SYS CLEARIFY LAPSCP

## (undated) DEVICE — PACK PROCEDURE SURG POD SC SRHP LF

## (undated) DEVICE — PUMP SUC IRR TBNG L10FT W/ HNDPC ASSEMB STRYKEFLOW 2

## (undated) DEVICE — CLIP INT L POLYMER LOK LIG HEM O LOK

## (undated) DEVICE — SOLUTION IV IRRIG POUR BRL 0.9% SODIUM CHL 2F7124

## (undated) DEVICE — DRAPE,EXTREMITY,89X128,STERILE: Brand: MEDLINE

## (undated) DEVICE — CANNULA SEAL